# Patient Record
Sex: FEMALE | Race: WHITE | Employment: OTHER | URBAN - NONMETROPOLITAN AREA
[De-identification: names, ages, dates, MRNs, and addresses within clinical notes are randomized per-mention and may not be internally consistent; named-entity substitution may affect disease eponyms.]

---

## 2018-07-24 ENCOUNTER — HOSPITAL ENCOUNTER (OUTPATIENT)
Dept: WOMENS IMAGING | Age: 68
Discharge: HOME | End: 2018-07-24
Payer: MEDICARE

## 2018-07-24 DIAGNOSIS — Z12.31 VISIT FOR SCREENING MAMMOGRAM: ICD-10-CM

## 2018-07-24 PROCEDURE — 77063 BREAST TOMOSYNTHESIS BI: CPT

## 2018-08-04 ENCOUNTER — HOSPITAL ENCOUNTER (EMERGENCY)
Age: 68
Discharge: HOME | End: 2018-08-04
Payer: MEDICARE

## 2018-08-04 ENCOUNTER — APPOINTMENT (OUTPATIENT)
Dept: INTERVENTIONAL RADIOLOGY/VASCULAR | Age: 68
End: 2018-08-04
Payer: MEDICARE

## 2018-08-04 ENCOUNTER — APPOINTMENT (OUTPATIENT)
Dept: GENERAL RADIOLOGY | Age: 68
End: 2018-08-04
Payer: MEDICARE

## 2018-08-04 ENCOUNTER — APPOINTMENT (OUTPATIENT)
Dept: CT IMAGING | Age: 68
End: 2018-08-04
Payer: MEDICARE

## 2018-08-04 VITALS
HEIGHT: 63 IN | DIASTOLIC BLOOD PRESSURE: 57 MMHG | SYSTOLIC BLOOD PRESSURE: 118 MMHG | TEMPERATURE: 97.9 F | OXYGEN SATURATION: 98 % | RESPIRATION RATE: 14 BRPM | BODY MASS INDEX: 25.16 KG/M2 | WEIGHT: 142 LBS | HEART RATE: 62 BPM

## 2018-08-04 DIAGNOSIS — R51.9 NONINTRACTABLE EPISODIC HEADACHE, UNSPECIFIED HEADACHE TYPE: ICD-10-CM

## 2018-08-04 DIAGNOSIS — R42 DIZZINESS: Primary | ICD-10-CM

## 2018-08-04 LAB
ALBUMIN SERPL-MCNC: 4.2 G/DL (ref 3.5–5.1)
ALP BLD-CCNC: 73 U/L (ref 38–126)
ALT SERPL-CCNC: 18 U/L (ref 11–66)
AMPHETAMINE+METHAMPHETAMINE URINE SCREEN: NEGATIVE
ANION GAP SERPL CALCULATED.3IONS-SCNC: 14 MEQ/L (ref 8–16)
AST SERPL-CCNC: 19 U/L (ref 5–40)
BARBITURATE QUANTITATIVE URINE: NEGATIVE
BASOPHILS # BLD: 0.5 %
BASOPHILS ABSOLUTE: 0.1 THOU/MM3 (ref 0–0.1)
BENZODIAZEPINE QUANTITATIVE URINE: NEGATIVE
BILIRUB SERPL-MCNC: 0.2 MG/DL (ref 0.3–1.2)
BILIRUBIN URINE: NEGATIVE
BLOOD, URINE: NEGATIVE
BUN BLDV-MCNC: 17 MG/DL (ref 7–22)
CALCIUM SERPL-MCNC: 9.4 MG/DL (ref 8.5–10.5)
CANNABINOID QUANTITATIVE URINE: NEGATIVE
CHARACTER, URINE: CLEAR
CHLORIDE BLD-SCNC: 102 MEQ/L (ref 98–111)
CO2: 25 MEQ/L (ref 23–33)
COCAINE METABOLITE QUANTITATIVE URINE: NEGATIVE
COLOR: YELLOW
CREAT SERPL-MCNC: 0.8 MG/DL (ref 0.4–1.2)
EKG ATRIAL RATE: 70 BPM
EKG P AXIS: 69 DEGREES
EKG P-R INTERVAL: 140 MS
EKG Q-T INTERVAL: 398 MS
EKG QRS DURATION: 88 MS
EKG QTC CALCULATION (BAZETT): 429 MS
EKG R AXIS: 70 DEGREES
EKG T AXIS: 73 DEGREES
EKG VENTRICULAR RATE: 70 BPM
EOSINOPHIL # BLD: 1.6 %
EOSINOPHILS ABSOLUTE: 0.2 THOU/MM3 (ref 0–0.4)
ERYTHROCYTE [DISTWIDTH] IN BLOOD BY AUTOMATED COUNT: 13.8 % (ref 11.5–14.5)
ERYTHROCYTE [DISTWIDTH] IN BLOOD BY AUTOMATED COUNT: 46.7 FL (ref 35–45)
ETHYL ALCOHOL, SERUM: < 0.01 %
GFR SERPL CREATININE-BSD FRML MDRD: 71 ML/MIN/1.73M2
GLUCOSE BLD-MCNC: 93 MG/DL (ref 70–108)
GLUCOSE URINE: NEGATIVE MG/DL
HCT VFR BLD CALC: 46.3 % (ref 37–47)
HEMOGLOBIN: 15.4 GM/DL (ref 12–16)
IMMATURE GRANS (ABS): 0.04 THOU/MM3 (ref 0–0.07)
IMMATURE GRANULOCYTES: 0.3 %
KETONES, URINE: NEGATIVE
LEUKOCYTE ESTERASE, URINE: NEGATIVE
LYMPHOCYTES # BLD: 28.9 %
LYMPHOCYTES ABSOLUTE: 3.5 THOU/MM3 (ref 1–4.8)
MCH RBC QN AUTO: 31.1 PG (ref 26–33)
MCHC RBC AUTO-ENTMCNC: 33.3 GM/DL (ref 32.2–35.5)
MCV RBC AUTO: 93.5 FL (ref 81–99)
MONOCYTES # BLD: 8.4 %
MONOCYTES ABSOLUTE: 1 THOU/MM3 (ref 0.4–1.3)
NITRITE, URINE: NEGATIVE
NUCLEATED RED BLOOD CELLS: 0 /100 WBC
OPIATES, URINE: NEGATIVE
OSMOLALITY CALCULATION: 282.5 MOSMOL/KG (ref 275–300)
OXYCODONE: NEGATIVE
PH UA: 5.5
PHENCYCLIDINE QUANTITATIVE URINE: NEGATIVE
PLATELET # BLD: 270 THOU/MM3 (ref 130–400)
PMV BLD AUTO: 9.8 FL (ref 9.4–12.4)
POTASSIUM SERPL-SCNC: 3.8 MEQ/L (ref 3.5–5.2)
PROTEIN UA: NEGATIVE
RBC # BLD: 4.95 MILL/MM3 (ref 4.2–5.4)
SEG NEUTROPHILS: 60.3 %
SEGMENTED NEUTROPHILS ABSOLUTE COUNT: 7.2 THOU/MM3 (ref 1.8–7.7)
SODIUM BLD-SCNC: 141 MEQ/L (ref 135–145)
SPECIFIC GRAVITY, URINE: 1.01 (ref 1–1.03)
TOTAL PROTEIN: 7.1 G/DL (ref 6.1–8)
TROPONIN T: < 0.01 NG/ML
TROPONIN T: < 0.01 NG/ML
UROBILINOGEN, URINE: 0.2 EU/DL
WBC # BLD: 12 THOU/MM3 (ref 4.8–10.8)

## 2018-08-04 PROCEDURE — 99284 EMERGENCY DEPT VISIT MOD MDM: CPT

## 2018-08-04 PROCEDURE — 93005 ELECTROCARDIOGRAM TRACING: CPT | Performed by: PHYSICIAN ASSISTANT

## 2018-08-04 PROCEDURE — 93010 ELECTROCARDIOGRAM REPORT: CPT | Performed by: INTERNAL MEDICINE

## 2018-08-04 PROCEDURE — 36415 COLL VENOUS BLD VENIPUNCTURE: CPT

## 2018-08-04 PROCEDURE — 80053 COMPREHEN METABOLIC PANEL: CPT

## 2018-08-04 PROCEDURE — 85025 COMPLETE CBC W/AUTO DIFF WBC: CPT

## 2018-08-04 PROCEDURE — 81003 URINALYSIS AUTO W/O SCOPE: CPT

## 2018-08-04 PROCEDURE — 70450 CT HEAD/BRAIN W/O DYE: CPT

## 2018-08-04 PROCEDURE — 80307 DRUG TEST PRSMV CHEM ANLYZR: CPT

## 2018-08-04 PROCEDURE — 71045 X-RAY EXAM CHEST 1 VIEW: CPT

## 2018-08-04 PROCEDURE — 93880 EXTRACRANIAL BILAT STUDY: CPT

## 2018-08-04 PROCEDURE — 6370000000 HC RX 637 (ALT 250 FOR IP): Performed by: PHYSICIAN ASSISTANT

## 2018-08-04 PROCEDURE — 84484 ASSAY OF TROPONIN QUANT: CPT

## 2018-08-04 PROCEDURE — G0480 DRUG TEST DEF 1-7 CLASSES: HCPCS

## 2018-08-04 RX ORDER — ACETAMINOPHEN 325 MG/1
650 TABLET ORAL ONCE
Status: COMPLETED | OUTPATIENT
Start: 2018-08-04 | End: 2018-08-04

## 2018-08-04 RX ADMIN — ACETAMINOPHEN 650 MG: 325 TABLET ORAL at 21:43

## 2018-08-04 ASSESSMENT — ENCOUNTER SYMPTOMS
RHINORRHEA: 0
EYE DISCHARGE: 0
EYE PAIN: 0
SHORTNESS OF BREATH: 0
NAUSEA: 1
ABDOMINAL PAIN: 0
COUGH: 0
WHEEZING: 0
DIARRHEA: 0
BACK PAIN: 0
VOMITING: 1
SORE THROAT: 0

## 2018-08-04 ASSESSMENT — PAIN SCALES - GENERAL: PAINLEVEL_OUTOF10: 2

## 2018-08-04 NOTE — ED NOTES
Pt presents to ED with c/o sudden onset of nausea/dizziness. Pt states she has been outside all day and this onset of dizziness/nausea happened when she was just standing. Pt denies any pain at this time.      Aureliano Chicas, LPN  27/45/11 [id number]

## 2018-08-04 NOTE — ED PROVIDER NOTES
Negative for congestion, ear pain, rhinorrhea, sore throat, trouble swallowing and voice change. Eyes: Negative for pain, discharge and visual disturbance. Respiratory: Negative for cough, shortness of breath and wheezing. Cardiovascular: Negative for chest pain, palpitations and leg swelling. Gastrointestinal: Positive for nausea and vomiting. Negative for abdominal pain and diarrhea. Genitourinary: Negative for difficulty urinating, dysuria, hematuria and vaginal discharge. Urinary Incontinence   Musculoskeletal: Negative for arthralgias, back pain, joint swelling and neck pain. Skin: Negative for pallor and rash. Neurological: Positive for dizziness (episodic) and headaches. Negative for syncope, weakness, light-headedness and numbness. Hematological: Negative for adenopathy. Psychiatric/Behavioral: Negative for confusion and suicidal ideas. The patient is not nervous/anxious. PAST MEDICAL HISTORY    has no past medical history on file. SURGICAL HISTORY      has a past surgical history that includes Hysterectomy. CURRENT MEDICATIONS       There are no discharge medications for this patient. ALLERGIES     is allergic to pcn [penicillins]. FAMILY HISTORY     has no family status information on file. family history is not on file. SOCIAL HISTORY      reports that she has been smoking. She does not have any smokeless tobacco history on file. PHYSICAL EXAM     INITIAL VITALS:  height is 5' 3\" (1.6 m) and weight is 142 lb (64.4 kg). Her oral temperature is 97.9 °F (36.6 °C). Her blood pressure is 118/57 (abnormal) and her pulse is 62. Her respiration is 14 and oxygen saturation is 98%. Physical Exam   Constitutional: She is oriented to person, place, and time. She appears well-developed and well-nourished. Non-toxic appearance. No distress. HENT:   Head: Normocephalic and atraumatic.    Right Ear: Hearing, tympanic membrane, external ear and ear canal normal. No hemotympanum. Left Ear: Hearing, tympanic membrane, external ear and ear canal normal. No hemotympanum. Nose: Nose normal. No sinus tenderness. Right sinus exhibits no maxillary sinus tenderness and no frontal sinus tenderness. Left sinus exhibits no maxillary sinus tenderness and no frontal sinus tenderness. Mouth/Throat: Uvula is midline, oropharynx is clear and moist and mucous membranes are normal. No oropharyngeal exudate, posterior oropharyngeal edema or posterior oropharyngeal erythema. Eyes: Conjunctivae and EOM are normal. Pupils are equal, round, and reactive to light. Right eye exhibits no discharge. Left eye exhibits no discharge. No scleral icterus. Neck: Normal range of motion, full passive range of motion without pain and phonation normal. Neck supple. No JVD present. No spinous process tenderness and no muscular tenderness present. Carotid bruit is not present. No Brudzinski's sign and no Kernig's sign noted. Cardiovascular: Normal rate, regular rhythm, intact distal pulses and normal pulses. Exam reveals no gallop and no friction rub. Murmur heard. Systolic murmur is present with a grade of 2/6   Pulses:       Radial pulses are 2+ on the right side, and 2+ on the left side. Dorsalis pedis pulses are 2+ on the right side, and 2+ on the left side. Posterior tibial pulses are 2+ on the right side, and 2+ on the left side. Patient states she knows she has a heart murmur. Denies any chest pain, shortness of breath, or syncope. No LE edema bilat. Pulmonary/Chest: Effort normal and breath sounds normal. No respiratory distress. She has no decreased breath sounds. She has no wheezes. She has no rhonchi. She has no rales. She exhibits no tenderness. Abdominal: Soft. Bowel sounds are normal. She exhibits no distension. There is no tenderness. There is no rebound, no guarding and no CVA tenderness. Musculoskeletal: Normal range of motion.  She exhibits no no stemi. Troponin and repeat 2 hr troponin normal. CXR, CT head, and carotid US normal. Orthostatic VS not suggestive of orthostatic hypotension. Patient given Tylenol for mild frontal HA and on reevaluation denies having any HA now. Patient denies HA that was acute and maximal at onset. No meningeal symptoms on exam, no signs of trauma. Discussed with patient admission and further imaging conducted but patient does not want to be admitted at this time. Patient instructed to stay well-hydrated and have close follow-up with PCP. Patient and family given return precautions to ED and discharged home in stable condition. CRITICAL CARE:   none    CONSULTS:  none    PROCEDURES:  none    FINAL IMPRESSION      1. Dizziness    2. Nonintractable episodic headache, unspecified headache type          DISPOSITION/PLAN   Discharge  1. Stay well-hydrated and well-nourished as discussed. 2. Recommend OTC tylenol or motrin as discussed for episodic headaches  3. follow-up with primary care physician on Monday. 4. return to emergency department if any fever, vision changes, dizziness returns, chest pain, shortness of breath, extremity weakness; or any new or worsening symptoms. PATIENT REFERRED TO:  Tuan Peterson MD  [address] [place] [address]  [phone]      follow-up with primary care physician on Monday. [address] [facility] [address] [address] EMERGENCY DEPT  [address] [facility]  [phone]    return to emergency department if any fever, vision changes, dizziness returns, chest pain, shortness of breath, extremity weakness; or any new or worsening symptoms. DISCHARGE MEDICATIONS:  There are no discharge medications for this patient. (Please note that portions of this note were completed with a voice recognition program.  Efforts were made to edit the dictations but occasionally words are mis-transcribed.)    The patient was given an opportunity to see the Emergency Attending.  The patient

## 2018-08-06 ASSESSMENT — ENCOUNTER SYMPTOMS
TROUBLE SWALLOWING: 0
VOICE CHANGE: 0

## 2018-08-24 ENCOUNTER — HOSPITAL ENCOUNTER (EMERGENCY)
Age: 68
Discharge: HOME | End: 2018-08-24
Payer: MEDICARE

## 2018-08-24 VITALS
HEART RATE: 65 BPM | TEMPERATURE: 98.3 F | OXYGEN SATURATION: 95 % | RESPIRATION RATE: 16 BRPM | SYSTOLIC BLOOD PRESSURE: 137 MMHG | DIASTOLIC BLOOD PRESSURE: 62 MMHG

## 2018-08-24 DIAGNOSIS — M54.50 ACUTE LEFT-SIDED LOW BACK PAIN WITHOUT SCIATICA: Primary | ICD-10-CM

## 2018-08-24 DIAGNOSIS — L30.9 ECZEMA, UNSPECIFIED TYPE: ICD-10-CM

## 2018-08-24 PROCEDURE — 99212 OFFICE O/P EST SF 10 MIN: CPT

## 2018-08-24 PROCEDURE — 99213 OFFICE O/P EST LOW 20 MIN: CPT | Performed by: NURSE PRACTITIONER

## 2018-08-24 RX ORDER — IBUPROFEN 400 MG/1
400 TABLET ORAL EVERY 6 HOURS PRN
COMMUNITY

## 2018-08-24 RX ORDER — CYCLOBENZAPRINE HCL 5 MG
5 TABLET ORAL 3 TIMES DAILY PRN
Qty: 12 TABLET | Refills: 0 | Status: SHIPPED | OUTPATIENT
Start: 2018-08-24 | End: 2022-03-03

## 2018-08-24 RX ORDER — BETAMETHASONE DIPROPIONATE 0.05 %
OINTMENT (GRAM) TOPICAL
Qty: 1 TUBE | Refills: 0 | Status: SHIPPED | OUTPATIENT
Start: 2018-08-24 | End: 2022-03-03

## 2018-08-24 RX ORDER — METHYLPREDNISOLONE 4 MG/1
TABLET ORAL
Qty: 1 KIT | Refills: 0 | Status: SHIPPED | OUTPATIENT
Start: 2018-08-24 | End: 2018-08-30

## 2018-08-24 RX ORDER — METHYLPREDNISOLONE 4 MG/1
TABLET ORAL
Qty: 1 KIT | Refills: 0 | Status: SHIPPED | OUTPATIENT
Start: 2018-08-24 | End: 2018-08-24

## 2018-08-24 ASSESSMENT — ENCOUNTER SYMPTOMS
DIARRHEA: 0
BACK PAIN: 1
SHORTNESS OF BREATH: 0
NAUSEA: 0
VOMITING: 0
ABDOMINAL PAIN: 0
CHEST TIGHTNESS: 0

## 2018-08-24 ASSESSMENT — PAIN DESCRIPTION - DESCRIPTORS: DESCRIPTORS: CONSTANT;ACHING

## 2018-08-24 ASSESSMENT — PAIN SCALES - GENERAL: PAINLEVEL_OUTOF10: 5

## 2018-08-24 ASSESSMENT — PAIN DESCRIPTION - ORIENTATION: ORIENTATION: LEFT

## 2018-08-24 ASSESSMENT — PAIN DESCRIPTION - LOCATION: LOCATION: BACK

## 2018-08-24 ASSESSMENT — PAIN DESCRIPTION - PAIN TYPE: TYPE: ACUTE PAIN

## 2018-08-24 NOTE — ED NOTES
No change in patients condition. Discharge instructions and prescriptions discussed with pt. Pt. Verbalized understanding of info given and ambulated to exit in stable condition.       Solomon Britt RN  08/24/18 [id number]

## 2018-08-24 NOTE — ED PROVIDER NOTES
used smokeless tobacco. She reports that she does not drink alcohol or use drugs. PHYSICAL EXAM     ED TRIAGE VITALS  BP: 137/62, Temp: 98.3 °F (36.8 °C), Pulse: 65, Resp: 16, SpO2: 95 %  Physical Exam   Constitutional: She is oriented to person, place, and time. Vital signs are normal. She appears well-developed and well-nourished. She is cooperative. She does not appear ill. No distress. HENT:   Head: Normocephalic and atraumatic. Mouth/Throat: Mucous membranes are normal.   Eyes: Conjunctivae are normal.   Neck: Normal range of motion and full passive range of motion without pain. Cardiovascular: Normal rate. Pulmonary/Chest: Effort normal. No accessory muscle usage. No respiratory distress. Musculoskeletal:        Lumbar back: She exhibits tenderness (left) and spasm (left). Neurological: She is alert and oriented to person, place, and time. Skin: Skin is warm and dry. Rash (to bilateral palms) noted. She is not diaphoretic. Psychiatric: She has a normal mood and affect. Her speech is normal.   Nursing note and vitals reviewed. DIAGNOSTIC RESULTS   Labs:No results found for this visit on 08/24/18. IMAGING:  No orders to display     URGENT CARE COURSE:     Vitals:    08/24/18 1446   BP: 137/62   Pulse: 65   Resp: 16   Temp: 98.3 °F (36.8 °C)   TempSrc: Oral   SpO2: 95%       Medications - No data to display  PROCEDURES:  None  FINAL IMPRESSION      1. Acute left-sided low back pain without sciatica    2. Eczema, unspecified type        DISPOSITION/PLAN   DISPOSITION Decision To Discharge 08/24/2018 03:02:16 PM  Physical assessment findings, diagnostic testing(s) if applicable, and vital signs reviewed with patient/patient representative. Questions answered. Medications as directed, including OTC medications for supportive care. Education provided on medications. Differential diagnosis(s) discussed with patient/patient representative.   Home care/self care instructions reviewed

## 2018-09-26 ENCOUNTER — HOSPITAL ENCOUNTER (OUTPATIENT)
Dept: MRI IMAGING | Age: 68
Discharge: HOME | End: 2018-09-26
Payer: MEDICARE

## 2018-09-26 DIAGNOSIS — M54.16 LUMBAR RADICULOPATHY: ICD-10-CM

## 2018-09-26 DIAGNOSIS — M54.5 LOW BACK PAIN, UNSPECIFIED BACK PAIN LATERALITY, UNSPECIFIED CHRONICITY, WITH SCIATICA PRESENCE UNSPECIFIED: ICD-10-CM

## 2018-09-26 PROCEDURE — 72148 MRI LUMBAR SPINE W/O DYE: CPT

## 2018-10-18 ENCOUNTER — HOSPITAL ENCOUNTER (OUTPATIENT)
Dept: INTERVENTIONAL RADIOLOGY/VASCULAR | Age: 68
Discharge: HOME | End: 2018-10-18
Payer: MEDICARE

## 2018-10-18 VITALS
DIASTOLIC BLOOD PRESSURE: 62 MMHG | BODY MASS INDEX: 23.91 KG/M2 | RESPIRATION RATE: 16 BRPM | OXYGEN SATURATION: 99 % | SYSTOLIC BLOOD PRESSURE: 132 MMHG | HEART RATE: 70 BPM | TEMPERATURE: 97 F | WEIGHT: 135 LBS

## 2018-10-18 PROCEDURE — 2709999900 HC NON-CHARGEABLE SUPPLY

## 2018-10-18 PROCEDURE — 2500000003 HC RX 250 WO HCPCS

## 2018-10-18 PROCEDURE — 6360000002 HC RX W HCPCS

## 2018-10-18 PROCEDURE — 62323 NJX INTERLAMINAR LMBR/SAC: CPT | Performed by: RADIOLOGY

## 2018-10-18 PROCEDURE — 6360000004 HC RX CONTRAST MEDICATION: Performed by: RADIOLOGY

## 2018-10-18 RX ORDER — BUPIVACAINE HYDROCHLORIDE 2.5 MG/ML
5 INJECTION, SOLUTION EPIDURAL; INFILTRATION; INTRACAUDAL ONCE
Status: COMPLETED | OUTPATIENT
Start: 2018-10-18 | End: 2018-10-18

## 2018-10-18 RX ORDER — METHYLPREDNISOLONE ACETATE 80 MG/ML
80 INJECTION, SUSPENSION INTRA-ARTICULAR; INTRALESIONAL; INTRAMUSCULAR; SOFT TISSUE ONCE
Status: COMPLETED | OUTPATIENT
Start: 2018-10-18 | End: 2018-10-18

## 2018-10-18 RX ADMIN — IOHEXOL 1 ML: 180 INJECTION INTRAVENOUS at 13:32

## 2018-10-18 RX ADMIN — METHYLPREDNISOLONE ACETATE 80 MG: 80 INJECTION, SUSPENSION INTRA-ARTICULAR; INTRALESIONAL; INTRAMUSCULAR; SOFT TISSUE at 13:32

## 2018-10-18 RX ADMIN — BUPIVACAINE HYDROCHLORIDE 2 ML: 2.5 INJECTION, SOLUTION EPIDURAL; INFILTRATION; INTRACAUDAL at 13:32

## 2018-10-18 ASSESSMENT — PAIN SCALES - GENERAL
PAINLEVEL_OUTOF10: 0
PAINLEVEL_OUTOF10: 4

## 2018-10-18 ASSESSMENT — PAIN DESCRIPTION - DESCRIPTORS: DESCRIPTORS: ACHING

## 2018-10-18 ASSESSMENT — PAIN - FUNCTIONAL ASSESSMENT: PAIN_FUNCTIONAL_ASSESSMENT: 0-10

## 2018-10-18 NOTE — PROGRESS NOTES
1210 pt arrives to OPN for nerve block. Pain 4/10 mid lower back with numbness down left leg. Hand grasp, pedal push and pull equal and strong.  at bedside, denies use of blood thinners  1213 PATIENT RIGHTS AND RESPONSIBILITIES SHEET OFFERED TO PT TO READ. 1220 side rail up x1, call light in reach  1315 to specials via cart  1345 [facility]  1400 tolerating liquids, denies needs.  Call light in reach  [phone] __m__ Safety:       (Environmental)  Elizabeth Rodarte to environment   Ensure ID band is correct and in place/ allergy band as needed   Assess for fall risk   Initiate fall precautions as applicable (fall band, side rails, etc.)   Call light within reach   Bed in low position/ wheels locked    _m___ Pain:        Assess pain level and characteristics   Administer analgesics as ordered   Assess effectiveness of pain management and report to MD as needed    _m___ Knowledge Deficit:   Assess baseline knowledge   Provide teaching at level of understanding   Provide teaching via preferred learning method   Evaluate teaching effectiveness    __m__ Hemodynamic/Respiratory Status:       (Pre and Post Procedure Monitoring)   Assess/Monitor vital signs and LOC   Assess Baseline SpO2 prior to any sedation   Obtain weight/height   Assess vital signs/ LOC until patient meets discharge criteria   Monitor procedure site and notify MD of any issues      1420 WRITTEN DISCHARGE INSTRUCTIONS GIVEN TO PT-VERBALIZES UNDERSTANDING  1425   PT DISCHARGED PER WHEEL CHAIR IN SATISFACTORY CONDITION

## 2018-10-18 NOTE — PROGRESS NOTES
Department of Radiology  Post Procedure Progress Note    Pre-Procedure Diagnosis:  Lumbar radiculopathy     Procedure Performed:  Epidural block/steroid injection      Anesthesia: local     Findings: successful    Immediate Complications:  None    Estimated Blood Loss: minimal    SEE DICTATED PROCEDURE NOTE FOR COMPLETE DETAILS.       Yuly Kelly MD   10/18/2018 1:34 PM

## 2018-12-05 RX ORDER — METHYLPREDNISOLONE ACETATE 80 MG/ML
80 INJECTION, SUSPENSION INTRA-ARTICULAR; INTRALESIONAL; INTRAMUSCULAR; SOFT TISSUE ONCE
Status: CANCELLED | OUTPATIENT
Start: 2018-12-05 | End: 2018-12-05

## 2018-12-05 RX ORDER — BUPIVACAINE HYDROCHLORIDE 2.5 MG/ML
5 INJECTION, SOLUTION EPIDURAL; INFILTRATION; INTRACAUDAL ONCE
Status: CANCELLED | OUTPATIENT
Start: 2018-12-05 | End: 2018-12-05

## 2018-12-18 RX ORDER — BUPIVACAINE HYDROCHLORIDE 2.5 MG/ML
5 INJECTION, SOLUTION EPIDURAL; INFILTRATION; INTRACAUDAL ONCE
Status: CANCELLED | OUTPATIENT
Start: 2018-12-18 | End: 2018-12-18

## 2018-12-18 RX ORDER — METHYLPREDNISOLONE ACETATE 80 MG/ML
80 INJECTION, SUSPENSION INTRA-ARTICULAR; INTRALESIONAL; INTRAMUSCULAR; SOFT TISSUE ONCE
Status: CANCELLED | OUTPATIENT
Start: 2018-12-18 | End: 2018-12-18

## 2019-04-18 ENCOUNTER — HOSPITAL ENCOUNTER (OUTPATIENT)
Dept: GENERAL RADIOLOGY | Age: 69
Discharge: HOME | End: 2019-04-18
Payer: MEDICARE

## 2019-04-18 ENCOUNTER — HOSPITAL ENCOUNTER (OUTPATIENT)
Age: 69
Discharge: HOME | End: 2019-04-18
Payer: MEDICARE

## 2019-04-18 DIAGNOSIS — Z01.818 PREOP TESTING: ICD-10-CM

## 2019-04-18 PROCEDURE — 71046 X-RAY EXAM CHEST 2 VIEWS: CPT

## 2019-06-28 ENCOUNTER — HOSPITAL ENCOUNTER (OUTPATIENT)
Dept: PHYSICAL THERAPY | Age: 69
Setting detail: THERAPIES SERIES
Discharge: HOME | End: 2019-06-28
Payer: MEDICARE

## 2019-06-28 PROCEDURE — 97161 PT EVAL LOW COMPLEX 20 MIN: CPT

## 2019-06-28 ASSESSMENT — PAIN DESCRIPTION - FREQUENCY: FREQUENCY: INTERMITTENT

## 2019-06-28 ASSESSMENT — PAIN DESCRIPTION - DESCRIPTORS: DESCRIPTORS: STABBING;SHOOTING

## 2019-06-28 ASSESSMENT — PAIN DESCRIPTION - ORIENTATION: ORIENTATION: RIGHT

## 2019-06-28 ASSESSMENT — PAIN DESCRIPTION - PAIN TYPE: TYPE: ACUTE PAIN;SURGICAL PAIN

## 2019-06-28 ASSESSMENT — PAIN SCALES - GENERAL: PAINLEVEL_OUTOF10: 2

## 2019-06-28 ASSESSMENT — PAIN DESCRIPTION - LOCATION: LOCATION: BACK;LEG

## 2019-06-28 ASSESSMENT — PAIN DESCRIPTION - PROGRESSION: CLINICAL_PROGRESSION: GRADUALLY IMPROVING

## 2019-06-28 NOTE — FLOWSHEET NOTE
** PLEASE SIGN, DATE AND TIME CERTIFICATION BELOW AND RETURN TO [facility] OUTPATIENT REHABILITATION (FAX #: [phone]). ATTEST/CO-SIGN IF ACCESSING VIA [employer]. THANK YOU.**    I certify that I have examined the patient below and determined that Physical Medicine and Rehabilitation service is necessary and that I approve the established plan of care for up to 90 days or as specifically noted. Attestation, signature or co-signature of physician indicates approval of certification requirements.    ________________________ ____________ __________  Physician Signature   Date   Time       [place]     Time In: 9199  Time Out: 1600  Minutes: 50                   Date: 2019  Patient Name: Jesus Dunn,  Gender:  female        CSN: [id number]   : 1950  (70 y.o.)  Referral Date : 19     Referring Practitioner: Dr. Nikki Luna       Diagnosis: M56.646  Treatment Diagnosis: low back pain; sciatica; difficulty walking  Additional Pertinent Hx: Fibromyalgia       General:  PT Visit Information  Onset Date: 19  PT Insurance Information: [employer] Medicare; aquatic  therapy covered; modalities covered except Alexa Loco; pre-certification needed   Total # of Visits Approved: 1  Total # of Visits to Date: 1  Plan of Care/Certification Expiration Date: 19  Progress Note Due Date: 19  Progress Note Counter: 1                        See Medical History Questionnaire for information related to allergies and medications. Subjective:  Chart Reviewed: Yes  Patient assessed for rehabilitation services?: Yes  Response To Previous Treatment: Not applicable  Family / Caregiver Present: No  Comments: Follow up scheduled 2019     Subjective: On Jenifer 3 she had L3/L4 laminectomy. Prior to surgery she did experience some sciatic type pain, however since surgery it has gotten worse.   The pain travels down to her right ankle and it feels like it needs  to be stretched. Dr. Tomas Tapia seemed somewhat surprised by her pain; he had expected her to be walking around pain free. Aggravating factors include laying down on her back; sitting for longer then 10 min.; walking will aggravate her back as well. According to [facility] , she was in extreme pain for the first 2 weeks; it has improved some and she is getting rest now.        Pain:  Patient Currently in Pain: Yes  Pain Assessment: 0-10  Pain Level: 2  Pain Type: Acute pain, Surgical pain  Pain Location: Back, Leg  Pain Orientation: Right  Pain Radiating Towards: Radiates down to her ankle   Pain Descriptors: Stabbing, Shooting  Pain Frequency: Intermittent  Clinical Progression: Gradually improving  Non-Pharmaceutical Pain Intervention(s): Cold applied     Social/Functional History:    Type of Home: House  Home Layout: One level  Home Access: Level entry             ADL Assistance: Independent  Homemaking Assistance: Independent  Ambulation Assistance: Independent  Transfer Assistance: Independent    Active : No  Mode of Transportation: Car, Family  Occupation: Retired  Leisure & Hobbies: walking; drive with confidence and without pain     Objective              Observation/Palpation  Posture: Good  Palpation: Tender with direct palpation on right piriformis and along SI joint on the right  Scar: very small incision over L4/L5 region             Strength RLE: Exception  R Hip Flexion: 3+/5  R Knee Flexion: 4-/5    Strength LLE: WFL               Rolling to Left: Stand by assistance  Rolling to Right: Stand by assistance  Supine to Sit: Stand by assistance  Sit to Supine: Independent                             Ambulation 1  Surface: carpet  Device: No Device  Assistance: Independent  Gait Deviations: Slow Antonia, Decreased step length  Distance: throughout the clinic                    Balance  Posture: Good  Sitting - Static: Good, +  Sitting - Dynamic: Good, +  Standing - Static: Good  Standing - Dynamic: Fair           Exercises  Exercise 1: MET correction of right vertical sacral rotation 3x10 sec hold   Exercise 2: Manually stretched right piriformis 3x30 sec hold  Exercise 3: Provided and reviewed handout with HEP-piriformis stretch, pelvic tilt             Activity Tolerance:  Activity Tolerance: Patient Tolerated treatment well, Patient limited by pain  Activity Tolerance: Some increase in discomfort at end of evaluation     Assessment: Body structures, Functions, Activity limitations: Decreased functional mobility , Decreased strength, Decreased endurance, Increased Pain  Assessment: Mckenna Torres is a 70 yr old woman presenting to therapy with chief complaint of increasing right sided low back pain following a lumbar laminectomy. Today's evaluation indicated deficits in strength, transfers and general mobility due to substantial pain and muscle guarding throughout the right side of her back. She will greatly benefit from therapy to work to improve flexibility/mobility, control pain and to establish a core strengthening program to provide greater internal support to her spine.    Prognosis: Good  Discharge Recommendations: Continue to assess pending progress    Patient Education:  Patient Education: Established HEP including piriformis stretch, pelvic tilt                   Plan:  Times per week: 2x per week  Plan weeks: 8 weeks  Specific instructions for Next Treatment: Re-check SI joint alignment; progress LE stretching program; DLS  Current Treatment Recommendations: Strengthening, Transfer Training, Balance Training, Neuromuscular Re-education, Manual Therapy - Joint Manipulation, Manual Therapy - Soft Tissue Mobilization, Pain Management, Patient/Caregiver Education & Training, Modalities, Home Exercise Program    History: Personal factors or comorbidities that impact plan of care - Low Complexity: no personal factors or comorbidities    Examination: Body structures and functions, activity limitations, participation restrictions; using standardized tests and measures - Low Complexity: 1-2 body structures and functional, activity limitation and/or participation restrictions. See restrictions and objective section above for details. Clinical Presentation: Moderate - Evolving with Changing Characteristics: radicular symptoms traveling further down her leg indicating peripheralization of her radicular symptoms; extensive muscle guarding and pain     Decision Making: Low Complexity due to despite her pain, she is able to be independent with ambulation, ADLs. Decision making was based on patient assessment and decision making process in determining plan of care and establishing reasonable expectations for measurable functional outcomes. Evaluation Complexity: Based on the findings of patient history, examination, clinical presentation, and decision making during this evaluation, the evaluation of Daniel Mitchell  is of low complexity. Goals:  Patient goals : Resolve back/butt pain; return to walking without limitation     Short term goals  Time Frame for Short term goals: 4 weeks   Short term goal 1: [facility] will be able to demonstrate and maintain good abdominal bracing with all exercises, transfers etc.   Short term goal 2: Centralization of referred radicular symptoms as her piriformis releases.   Short term goal 3: [facility] will be able to sleep through the night without waking do to radicular symptoms down her leg    Long term goals  Time Frame for Long term goals : 8 weeks   Long term goal 1: Discharge with independent HEP  Long term goal 2: Complete resolution of radicular symptoms   Long term goal 3: [facility] will be able to walk, sit etc for 60+ min at a time without being limited by low back pain    Ilsa Guzman, PT

## 2019-07-16 ENCOUNTER — HOSPITAL ENCOUNTER (OUTPATIENT)
Dept: PHYSICAL THERAPY | Age: 69
Setting detail: THERAPIES SERIES
Discharge: HOME | End: 2019-07-16
Payer: MEDICARE

## 2019-07-16 PROCEDURE — 97110 THERAPEUTIC EXERCISES: CPT

## 2019-07-16 ASSESSMENT — PAIN SCALES - GENERAL: PAINLEVEL_OUTOF10: 3

## 2019-07-16 ASSESSMENT — PAIN DESCRIPTION - PROGRESSION: CLINICAL_PROGRESSION: GRADUALLY IMPROVING

## 2019-07-19 ENCOUNTER — HOSPITAL ENCOUNTER (OUTPATIENT)
Dept: PHYSICAL THERAPY | Age: 69
Setting detail: THERAPIES SERIES
Discharge: HOME | End: 2019-07-19
Payer: MEDICARE

## 2019-07-19 PROCEDURE — G0283 ELEC STIM OTHER THAN WOUND: HCPCS

## 2019-07-19 PROCEDURE — 97140 MANUAL THERAPY 1/> REGIONS: CPT

## 2019-07-19 ASSESSMENT — PAIN SCALES - GENERAL: PAINLEVEL_OUTOF10: 3

## 2019-07-19 ASSESSMENT — PAIN DESCRIPTION - PROGRESSION: CLINICAL_PROGRESSION: GRADUALLY IMPROVING

## 2019-07-19 NOTE — PROGRESS NOTES
[place]     Time In: 3659  Time Out: 3287  Minutes: 47  Timed Code Treatment Minutes: 32 Minutes                Date: 2019  Patient Name: Hong Vera,  Gender:  female        CSN: [id number]   : 1950  (70 y.o.)  Referral Date : 19    Referring Practitioner: Dr. George Kimbrough       Diagnosis: A17.649  Treatment Diagnosis: low back pain; sciatica; difficulty walking   Additional Pertinent Hx: Fibromyalgia                  General:  PT Visit Information  Onset Date: 19  PT Insurance Information: [employer]; aquatic  therapy covered; modalities covered except Mena Arrant; pre-certification needed   Total # of Visits Approved: 1  Total # of Visits to Date: 3  Plan of Care/Certification Expiration Date: 19  Progress Note Counter: 3/10               Subjective:  Chart Reviewed: Yes  Patient assessed for rehabilitation services?: Yes  Response To Previous Treatment: Not applicable  Family / Caregiver Present: No  Comments: Follow up scheduled 2019     Subjective: Was unable to sit or do any of the exercises after last time-I'm feeling a little bit better today. Pain:  Patient Currently in Pain: Yes  Pain Assessment: 0-10  Pain Level: 3  Clinical Progression: Gradually improving      Objective       Exercises  Exercise 2: Manually stretched right piriformis 3x30 second hold  Exercise 6: Bilateral hamstring stretches with towel 3x 15 seconds each. Exercise 8: Left side lying for STM along right flank, piriformis and glutes  Exercise 9: Left side lying for IFC to right glute/piriformis with ice sweep at 40% for 15 min         Activity Tolerance:  Activity Tolerance: Patient Tolerated treatment well, Patient limited by pain    Assessment:   Body structures, Functions, Activity limitations: Decreased functional mobility , Decreased strength, Decreased endurance, Increased Pain  Assessment: Hamida Cuadra was very guarded and tender throughout her back today; backed off the core exercises and will have her focus on stretching, pelvic tilt and walking. Trial of IFC in attempt to lessen muscle gurading/spasms. Prognosis: Good  Discharge Recommendations: Continue to assess pending progress    Patient Education:  Patient Education: Monitor pain after therapy session. Continue HEP adding core strengthening and hamstring stretches with handout provided. Plan:  Times per week: 2x per week  Plan weeks: 8 weeks  Specific instructions for Next Treatment: Re-check SI joint alignment; progress LE stretching program; DLS  Current Treatment Recommendations: Strengthening, Transfer Training, Balance Training, Neuromuscular Re-education, Manual Therapy - Joint Manipulation, Manual Therapy - Soft Tissue Mobilization, Pain Management, Patient/Caregiver Education & Training, Modalities, Home Exercise Program  Plan Comment: Follow up on IFC; continued with current POC    Goals:  Patient goals : Resolve back/butt pain; return to walking without limitation     Short term goals  Time Frame for Short term goals: 4 weeks   Short term goal 1: Ponce Bird will be able to demonstrate and maintain good abdominal bracing with all exercises, transfers etc.   Short term goal 2: Centralization of referred radicular symptoms as her piriformis releases.   Short term goal 3: [facility] Bird will be able to sleep through the night without waking do to radicular symptoms down her leg    Long term goals  Time Frame for Long term goals : 8 weeks   Long term goal 1: Discharge with independent HEP  Long term goal 2: Complete resolution of radicular symptoms   Long term goal 3: [place] Bird will be able to walk, sit etc for 60+ min at a time without being limited by low back pain    Mikey Palma, PT

## 2019-07-23 ENCOUNTER — HOSPITAL ENCOUNTER (OUTPATIENT)
Dept: PHYSICAL THERAPY | Age: 69
Setting detail: THERAPIES SERIES
Discharge: HOME | End: 2019-07-23
Payer: MEDICARE

## 2019-07-23 PROCEDURE — 97140 MANUAL THERAPY 1/> REGIONS: CPT

## 2019-07-23 PROCEDURE — 97035 APP MDLTY 1+ULTRASOUND EA 15: CPT

## 2019-07-23 ASSESSMENT — PAIN DESCRIPTION - PROGRESSION: CLINICAL_PROGRESSION: GRADUALLY IMPROVING

## 2019-07-23 ASSESSMENT — PAIN SCALES - GENERAL: PAINLEVEL_OUTOF10: 1

## 2019-07-26 ENCOUNTER — HOSPITAL ENCOUNTER (OUTPATIENT)
Dept: PHYSICAL THERAPY | Age: 69
Setting detail: THERAPIES SERIES
Discharge: HOME | End: 2019-07-26
Payer: MEDICARE

## 2019-07-26 PROCEDURE — 97110 THERAPEUTIC EXERCISES: CPT

## 2019-07-26 PROCEDURE — 97035 APP MDLTY 1+ULTRASOUND EA 15: CPT

## 2019-07-26 PROCEDURE — 97140 MANUAL THERAPY 1/> REGIONS: CPT

## 2019-07-26 ASSESSMENT — PAIN DESCRIPTION - LOCATION: LOCATION: BACK;LEG

## 2019-07-26 ASSESSMENT — PAIN DESCRIPTION - PAIN TYPE: TYPE: SURGICAL PAIN;ACUTE PAIN

## 2019-07-26 ASSESSMENT — PAIN DESCRIPTION - DESCRIPTORS: DESCRIPTORS: ACHING

## 2019-07-26 ASSESSMENT — PAIN DESCRIPTION - ORIENTATION: ORIENTATION: RIGHT

## 2019-07-26 ASSESSMENT — PAIN SCALES - GENERAL: PAINLEVEL_OUTOF10: 1

## 2019-07-30 ENCOUNTER — HOSPITAL ENCOUNTER (OUTPATIENT)
Dept: PHYSICAL THERAPY | Age: 69
Setting detail: THERAPIES SERIES
Discharge: HOME | End: 2019-07-30
Payer: MEDICARE

## 2019-07-30 PROCEDURE — 97140 MANUAL THERAPY 1/> REGIONS: CPT

## 2019-07-30 PROCEDURE — 97035 APP MDLTY 1+ULTRASOUND EA 15: CPT

## 2019-08-07 ENCOUNTER — HOSPITAL ENCOUNTER (OUTPATIENT)
Dept: PHYSICAL THERAPY | Age: 69
Setting detail: THERAPIES SERIES
Discharge: HOME | End: 2019-08-07
Payer: MEDICARE

## 2019-08-07 PROCEDURE — 97035 APP MDLTY 1+ULTRASOUND EA 15: CPT

## 2019-08-07 PROCEDURE — 97140 MANUAL THERAPY 1/> REGIONS: CPT

## 2019-08-07 PROCEDURE — 97112 NEUROMUSCULAR REEDUCATION: CPT

## 2019-08-07 ASSESSMENT — PAIN SCALES - GENERAL: PAINLEVEL_OUTOF10: 1

## 2019-08-14 ENCOUNTER — HOSPITAL ENCOUNTER (OUTPATIENT)
Dept: PHYSICAL THERAPY | Age: 69
Setting detail: THERAPIES SERIES
Discharge: HOME | End: 2019-08-14
Payer: MEDICARE

## 2019-08-14 PROCEDURE — 97140 MANUAL THERAPY 1/> REGIONS: CPT

## 2019-08-14 PROCEDURE — 97112 NEUROMUSCULAR REEDUCATION: CPT

## 2019-08-14 PROCEDURE — 97035 APP MDLTY 1+ULTRASOUND EA 15: CPT

## 2019-08-21 ENCOUNTER — HOSPITAL ENCOUNTER (OUTPATIENT)
Dept: PHYSICAL THERAPY | Age: 69
Setting detail: THERAPIES SERIES
Discharge: HOME | End: 2019-08-21
Payer: MEDICARE

## 2019-08-21 PROCEDURE — 97035 APP MDLTY 1+ULTRASOUND EA 15: CPT

## 2019-08-21 PROCEDURE — 97140 MANUAL THERAPY 1/> REGIONS: CPT

## 2019-08-21 PROCEDURE — 97112 NEUROMUSCULAR REEDUCATION: CPT

## 2019-08-21 NOTE — DISCHARGE SUMMARY
Functions, Activity limitations: Decreased functional mobility , Decreased strength, Decreased endurance, Increased Pain  Assessment: Dede Delgado is discharged from therapy with independent HEP and instructions on Rock tape. Dede Delgado has made great gains with therapy and continues to work on getting back to her normal walking routine. Prognosis: Good  No Skilled PT: Independent with functional mobility     Patient Education:  Patient Education: Educated pt to tape; educated pt's  on how to tape her                      Plan:  Times per week: Discharge     Goals:  Patient goals : Resolve back/butt pain; return to walking without limitation     Short term goals  Time Frame for Short term goals: 4 weeks   Short term goal 1: Lolita Wilkins will be able to demonstrate and maintain good abdominal bracing with all exercises, transfers etc. GOAL MET  Short term goal 2: Centralization of referred radicular symptoms as her piriformis releases.  GOAL MET; pain isolated to piriformis   Short term goal 3: Lolita Wilkins will be able to sleep through the night without waking do to radicular symptoms down her leg GOAL MET     Long term goals  Time Frame for Long term goals : 8 weeks   Long term goal 1: Discharge with independent HEP ONGOING  Long term goal 2: Complete resolution of radicular symptoms GOAL MET-denies radicular symptoms; reports more of an ache  Long term goal 3: Lolita Wilkins will be able to walk, sit etc for 60+ min at a time without being limited by low back pain NOT MET:  able to stand for 20-30 min; able to sit for 15-20 min; walking tolerance limited to 20 min     Ruthy Gunderson, PT

## 2020-04-23 ENCOUNTER — APPOINTMENT (OUTPATIENT)
Dept: GENERAL RADIOLOGY | Age: 70
End: 2020-04-23
Payer: MEDICARE

## 2020-04-23 ENCOUNTER — HOSPITAL ENCOUNTER (EMERGENCY)
Age: 70
Discharge: HOME | End: 2020-04-23
Attending: EMERGENCY MEDICINE
Payer: MEDICARE

## 2020-04-23 VITALS
HEIGHT: 63 IN | TEMPERATURE: 98 F | HEART RATE: 78 BPM | SYSTOLIC BLOOD PRESSURE: 142 MMHG | DIASTOLIC BLOOD PRESSURE: 59 MMHG | WEIGHT: 135 LBS | OXYGEN SATURATION: 98 % | BODY MASS INDEX: 23.92 KG/M2 | RESPIRATION RATE: 19 BRPM

## 2020-04-23 PROCEDURE — 73030 X-RAY EXAM OF SHOULDER: CPT

## 2020-04-23 PROCEDURE — 99283 EMERGENCY DEPT VISIT LOW MDM: CPT

## 2020-04-23 PROCEDURE — 73060 X-RAY EXAM OF HUMERUS: CPT

## 2020-04-23 PROCEDURE — 96375 TX/PRO/DX INJ NEW DRUG ADDON: CPT

## 2020-04-23 PROCEDURE — 96374 THER/PROPH/DIAG INJ IV PUSH: CPT

## 2020-04-23 PROCEDURE — 6360000002 HC RX W HCPCS: Performed by: EMERGENCY MEDICINE

## 2020-04-23 RX ORDER — ONDANSETRON 4 MG/1
4 TABLET, ORALLY DISINTEGRATING ORAL EVERY 8 HOURS PRN
Qty: 20 TABLET | Refills: 0 | Status: SHIPPED | OUTPATIENT
Start: 2020-04-23 | End: 2022-03-03

## 2020-04-23 RX ORDER — HYDROCODONE BITARTRATE AND ACETAMINOPHEN 5; 325 MG/1; MG/1
1 TABLET ORAL EVERY 8 HOURS PRN
Qty: 15 TABLET | Refills: 0 | Status: SHIPPED | OUTPATIENT
Start: 2020-04-23 | End: 2020-04-28

## 2020-04-23 RX ORDER — KETOROLAC TROMETHAMINE 30 MG/ML
15 INJECTION, SOLUTION INTRAMUSCULAR; INTRAVENOUS ONCE
Status: COMPLETED | OUTPATIENT
Start: 2020-04-23 | End: 2020-04-23

## 2020-04-23 RX ADMIN — HYDROMORPHONE HYDROCHLORIDE 1 MG: 1 INJECTION, SOLUTION INTRAMUSCULAR; INTRAVENOUS; SUBCUTANEOUS at 21:01

## 2020-04-23 RX ADMIN — KETOROLAC TROMETHAMINE 15 MG: 30 INJECTION, SOLUTION INTRAMUSCULAR at 21:02

## 2020-04-23 ASSESSMENT — PAIN DESCRIPTION - ORIENTATION: ORIENTATION: RIGHT

## 2020-04-23 ASSESSMENT — PAIN SCALES - GENERAL
PAINLEVEL_OUTOF10: 5
PAINLEVEL_OUTOF10: 5

## 2020-04-23 ASSESSMENT — PAIN DESCRIPTION - DESCRIPTORS: DESCRIPTORS: ACHING

## 2020-04-23 ASSESSMENT — PAIN DESCRIPTION - PAIN TYPE: TYPE: ACUTE PAIN

## 2020-04-23 ASSESSMENT — PAIN DESCRIPTION - LOCATION: LOCATION: ARM;SHOULDER

## 2020-04-24 NOTE — ED PROVIDER NOTES
[facility] EMERGENCY DEPT      CHIEF COMPLAINT       Chief Complaint   Patient presents with    Arm Injury       Nurses Notes reviewed and I agree except as noted in the HPI. HISTORY OF PRESENT ILLNESS    Sabrina Hernandez is a 78 y.o. female who presents plaint of mechanical fall, complaining of right shoulder pain. Patient states that she slipped on a carpet, fell forward, chipped her front teeth. Denies loss of consciousness. Denies neck pain. Onset: Acute  Duration: Prior to arrival  Timing: Constant pain  Location of Pain: Right shoulder  Intesity/severity: Moderate pain  Modifying Factors: Pain made worse with movement  Relieved by;  Previous Episodes; Tx Before arrival: None  REVIEW OF SYSTEMS      Review of Systems   Constitutional: Negative for fever, chills, diaphoresis and fatigue. HENT: Negative for congestion, drooling, facial swelling and sore throat. Front chipped teeth. Eyes: Negative for photophobia, pain and discharge. Respiratory: Negative for cough, shortness of breath, wheezing and stridor. Cardiovascular: Negative for chest pain, palpitations and leg swelling. Gastrointestinal: Negative for abdominal pain, blood in stool and abdominal distention. Genitourinary: Negative for dysuria, urgency, hematuria and difficulty urinating. Musculoskeletal: Positive for right shoulder pain. Skin; No rash, No itching  Neurological: Negative for seizures, weakness and numbness. Psychiatric/Behavioral: Negative for hallucinations, confusion and agitation. PAST MEDICAL HISTORY    has no past medical history on file. SURGICAL HISTORY      has a past surgical history that includes Hysterectomy. CURRENT MEDICATIONS       Previous Medications    BETAMETHASONE DIPROPIONATE (DIPROLENE) 0.05 % OINTMENT    Apply topically 2 times daily.     CYCLOBENZAPRINE (FLEXERIL) 5 MG TABLET    Take 1 tablet by mouth 3 times daily as needed for Muscle spasms    IBUPROFEN (ADVIL;MOTRIN) 400 MG

## 2020-06-23 ENCOUNTER — HOSPITAL ENCOUNTER (OUTPATIENT)
Dept: INTERVENTIONAL RADIOLOGY/VASCULAR | Age: 70
Discharge: HOME | End: 2020-06-23
Payer: MEDICARE

## 2020-06-23 PROCEDURE — 93971 EXTREMITY STUDY: CPT

## 2020-12-03 NOTE — PROGRESS NOTES
[facility]     Time In: 1600  Time Out: 5871  Minutes: 43  Timed Code Treatment Minutes: 37 Minutes                Date: 2019  Patient Name: Buffy De Luna,  Gender:  female        CSN: [id number]   : 1950  (70 y.o.)  Referral Date : 19    Referring Practitioner: Dr. Leslee Lloyd       Diagnosis: F08.480  Treatment Diagnosis: low back pain; sciatica; difficulty walking   Additional Pertinent Hx: Fibromyalgia                  General:  PT Visit Information  Onset Date: 19  PT Insurance Information: [employer] Medicare; aquatic  therapy covered; modalities covered except Wendy Shharry; pre-certification needed   Total # of Visits Approved: 1  Total # of Visits to Date: 4  Plan of Care/Certification Expiration Date: 19  Progress Note Counter: 4/10               Subjective:  Chart Reviewed: Yes  Patient assessed for rehabilitation services?: Yes  Response To Previous Treatment: Not applicable  Family / Caregiver Present: No  Comments: Follow up scheduled 2019     Subjective: Went on a walk this AM, however it was on uneven surfaces which may have aggravated my pain a bit. My pain is localized to only the one spot      Pain:  Patient Currently in Pain: Yes  Pain Assessment: 0-10  Pain Level: 1  Clinical Progression: Gradually improving      Objective         Exercises  Exercise 2: Manually stretched right piriformis 3x30 second hold  Exercise 6: Bilateral hamstring stretches with towel 3x 15 seconds each. Exercise 8: Left side lying for STM along right flank, piriformis and glutes  Exercise 9: Left side lying for US 1.2 W/cm2 pulsed at 1 MHz throughout her right flank  Exercise 10: Gentle manual long axis traction 2x30 sec hold         Activity Tolerance:  Activity Tolerance: Patient Tolerated treatment well    Assessment:   Body structures, Functions, Activity limitations: Decreased functional mobility ,
98

## 2021-10-08 ENCOUNTER — HOSPITAL ENCOUNTER (OUTPATIENT)
Dept: WOMENS IMAGING | Age: 71
Discharge: HOME | End: 2021-10-08
Payer: MEDICARE

## 2021-10-08 DIAGNOSIS — Z12.31 VISIT FOR SCREENING MAMMOGRAM: ICD-10-CM

## 2021-10-08 PROCEDURE — 77063 BREAST TOMOSYNTHESIS BI: CPT

## 2022-03-03 ENCOUNTER — APPOINTMENT (OUTPATIENT)
Dept: GENERAL RADIOLOGY | Age: 72
DRG: 480 | End: 2022-03-03
Payer: MEDICARE

## 2022-03-03 ENCOUNTER — ANESTHESIA (OUTPATIENT)
Dept: OPERATING ROOM | Age: 72
DRG: 480 | End: 2022-03-03
Payer: MEDICARE

## 2022-03-03 ENCOUNTER — APPOINTMENT (OUTPATIENT)
Dept: CT IMAGING | Age: 72
DRG: 480 | End: 2022-03-03
Payer: MEDICARE

## 2022-03-03 ENCOUNTER — HOSPITAL ENCOUNTER (INPATIENT)
Age: 72
LOS: 8 days | Discharge: HOME HEALTH | DRG: 480 | End: 2022-03-11
Attending: ORTHOPAEDIC SURGERY | Admitting: ORTHOPAEDIC SURGERY
Payer: MEDICARE

## 2022-03-03 ENCOUNTER — ANESTHESIA EVENT (OUTPATIENT)
Dept: OPERATING ROOM | Age: 72
DRG: 480 | End: 2022-03-03
Payer: MEDICARE

## 2022-03-03 VITALS — TEMPERATURE: 98.4 F | DIASTOLIC BLOOD PRESSURE: 58 MMHG | OXYGEN SATURATION: 99 % | SYSTOLIC BLOOD PRESSURE: 123 MMHG

## 2022-03-03 DIAGNOSIS — S72.002A CLOSED FRACTURE OF LEFT HIP, INITIAL ENCOUNTER (HCC): ICD-10-CM
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
## 2022-03-03 PROBLEM — S72.142A INTERTROCHANTERIC FRACTURE OF LEFT FEMUR, CLOSED, INITIAL ENCOUNTER (HCC): Status: ACTIVE | Noted: 2022-03-03

## 2022-03-03 LAB
ABO: NORMAL
ANION GAP SERPL CALCULATED.3IONS-SCNC: 12 MEQ/L (ref 8–16)
ANTIBODY SCREEN: NORMAL
APTT: 28 SECONDS (ref 22–38)
BASOPHILS # BLD: 0.5 %
BASOPHILS ABSOLUTE: 0 THOU/MM3 (ref 0–0.1)
BUN BLDV-MCNC: 17 MG/DL (ref 7–22)
CALCIUM SERPL-MCNC: 9.3 MG/DL (ref 8.5–10.5)
CHLORIDE BLD-SCNC: 101 MEQ/L (ref 98–111)
CO2: 23 MEQ/L (ref 23–33)
CREAT SERPL-MCNC: 0.7 MG/DL (ref 0.4–1.2)
EOSINOPHIL # BLD: 0.7 %
EOSINOPHILS ABSOLUTE: 0.1 THOU/MM3 (ref 0–0.4)
ERYTHROCYTE [DISTWIDTH] IN BLOOD BY AUTOMATED COUNT: 13.2 % (ref 11.5–14.5)
ERYTHROCYTE [DISTWIDTH] IN BLOOD BY AUTOMATED COUNT: 43.6 FL (ref 35–45)
GFR SERPL CREATININE-BSD FRML MDRD: 82 ML/MIN/1.73M2
GLUCOSE BLD-MCNC: 99 MG/DL (ref 70–108)
HCT VFR BLD CALC: 43 % (ref 37–47)
HEMOGLOBIN: 13.9 GM/DL (ref 12–16)
IMMATURE GRANS (ABS): 0.05 THOU/MM3 (ref 0–0.07)
IMMATURE GRANULOCYTES: 0.6 %
INR BLD: 1.02 (ref 0.85–1.13)
LYMPHOCYTES # BLD: 31.3 %
LYMPHOCYTES ABSOLUTE: 2.6 THOU/MM3 (ref 1–4.8)
MAGNESIUM: 2.1 MG/DL (ref 1.6–2.4)
MCH RBC QN AUTO: 29.3 PG (ref 26–33)
MCHC RBC AUTO-ENTMCNC: 32.3 GM/DL (ref 32.2–35.5)
MCV RBC AUTO: 90.7 FL (ref 81–99)
MONOCYTES # BLD: 6.7 %
MONOCYTES ABSOLUTE: 0.5 THOU/MM3 (ref 0.4–1.3)
NUCLEATED RED BLOOD CELLS: 0 /100 WBC
OSMOLALITY CALCULATION: 273.5 MOSMOL/KG (ref 275–300)
PLATELET # BLD: 212 THOU/MM3 (ref 130–400)
PMV BLD AUTO: 9 FL (ref 9.4–12.4)
POTASSIUM SERPL-SCNC: 4.1 MEQ/L (ref 3.5–5.2)
RBC # BLD: 4.74 MILL/MM3 (ref 4.2–5.4)
RH FACTOR: NORMAL
SEG NEUTROPHILS: 60.2 %
SEGMENTED NEUTROPHILS ABSOLUTE COUNT: 4.9 THOU/MM3 (ref 1.8–7.7)
SODIUM BLD-SCNC: 136 MEQ/L (ref 135–145)
WBC # BLD: 8.2 THOU/MM3 (ref 4.8–10.8)

## 2022-03-03 PROCEDURE — 96365 THER/PROPH/DIAG IV INF INIT: CPT

## 2022-03-03 PROCEDURE — C1713 ANCHOR/SCREW BN/BN,TIS/BN: HCPCS | Performed by: ORTHOPAEDIC SURGERY

## 2022-03-03 PROCEDURE — 3209999900 FLUORO FOR SURGICAL PROCEDURES

## 2022-03-03 PROCEDURE — 85610 PROTHROMBIN TIME: CPT

## 2022-03-03 PROCEDURE — 6360000002 HC RX W HCPCS: Performed by: PHYSICIAN ASSISTANT

## 2022-03-03 PROCEDURE — 85730 THROMBOPLASTIN TIME PARTIAL: CPT

## 2022-03-03 PROCEDURE — 3600000004 HC SURGERY LEVEL 4 BASE: Performed by: ORTHOPAEDIC SURGERY

## 2022-03-03 PROCEDURE — 2580000003 HC RX 258: Performed by: PHYSICIAN ASSISTANT

## 2022-03-03 PROCEDURE — 6360000002 HC RX W HCPCS: Performed by: ANESTHESIOLOGY

## 2022-03-03 PROCEDURE — 73552 X-RAY EXAM OF FEMUR 2/>: CPT

## 2022-03-03 PROCEDURE — 3600000014 HC SURGERY LEVEL 4 ADDTL 15MIN: Performed by: ORTHOPAEDIC SURGERY

## 2022-03-03 PROCEDURE — 6820000001 HC L2 TRAUMA SURGERY EVALUATION: Performed by: ORTHOPAEDIC SURGERY

## 2022-03-03 PROCEDURE — 96375 TX/PRO/DX INJ NEW DRUG ADDON: CPT

## 2022-03-03 PROCEDURE — 80048 BASIC METABOLIC PNL TOTAL CA: CPT

## 2022-03-03 PROCEDURE — 2580000003 HC RX 258: Performed by: ANESTHESIOLOGY

## 2022-03-03 PROCEDURE — 99285 EMERGENCY DEPT VISIT HI MDM: CPT

## 2022-03-03 PROCEDURE — 86850 RBC ANTIBODY SCREEN: CPT

## 2022-03-03 PROCEDURE — 6360000002 HC RX W HCPCS: Performed by: NURSE ANESTHETIST, CERTIFIED REGISTERED

## 2022-03-03 PROCEDURE — 71045 X-RAY EXAM CHEST 1 VIEW: CPT

## 2022-03-03 PROCEDURE — 72125 CT NECK SPINE W/O DYE: CPT

## 2022-03-03 PROCEDURE — 7100000000 HC PACU RECOVERY - FIRST 15 MIN: Performed by: ORTHOPAEDIC SURGERY

## 2022-03-03 PROCEDURE — 86901 BLOOD TYPING SEROLOGIC RH(D): CPT

## 2022-03-03 PROCEDURE — 1200000000 HC SEMI PRIVATE

## 2022-03-03 PROCEDURE — 70450 CT HEAD/BRAIN W/O DYE: CPT

## 2022-03-03 PROCEDURE — 2720000010 HC SURG SUPPLY STERILE: Performed by: ORTHOPAEDIC SURGERY

## 2022-03-03 PROCEDURE — 85025 COMPLETE CBC W/AUTO DIFF WBC: CPT

## 2022-03-03 PROCEDURE — 73502 X-RAY EXAM HIP UNI 2-3 VIEWS: CPT

## 2022-03-03 PROCEDURE — 6360000002 HC RX W HCPCS: Performed by: ORTHOPAEDIC SURGERY

## 2022-03-03 PROCEDURE — 3700000000 HC ANESTHESIA ATTENDED CARE: Performed by: ORTHOPAEDIC SURGERY

## 2022-03-03 PROCEDURE — 93005 ELECTROCARDIOGRAM TRACING: CPT | Performed by: PHYSICIAN ASSISTANT

## 2022-03-03 PROCEDURE — 2500000003 HC RX 250 WO HCPCS: Performed by: NURSE ANESTHETIST, CERTIFIED REGISTERED

## 2022-03-03 PROCEDURE — 2709999900 HC NON-CHARGEABLE SUPPLY: Performed by: ORTHOPAEDIC SURGERY

## 2022-03-03 PROCEDURE — 0QS706Z REPOSITION LEFT UPPER FEMUR WITH INTRAMEDULLARY INTERNAL FIXATION DEVICE, OPEN APPROACH: ICD-10-PCS | Performed by: DERMATOLOGY

## 2022-03-03 PROCEDURE — 36415 COLL VENOUS BLD VENIPUNCTURE: CPT

## 2022-03-03 PROCEDURE — 2500000003 HC RX 250 WO HCPCS: Performed by: PHYSICIAN ASSISTANT

## 2022-03-03 PROCEDURE — 83735 ASSAY OF MAGNESIUM: CPT

## 2022-03-03 PROCEDURE — 86900 BLOOD TYPING SEROLOGIC ABO: CPT

## 2022-03-03 PROCEDURE — 7100000001 HC PACU RECOVERY - ADDTL 15 MIN: Performed by: ORTHOPAEDIC SURGERY

## 2022-03-03 PROCEDURE — 3700000001 HC ADD 15 MINUTES (ANESTHESIA): Performed by: ORTHOPAEDIC SURGERY

## 2022-03-03 PROCEDURE — 96376 TX/PRO/DX INJ SAME DRUG ADON: CPT

## 2022-03-03 DEVICE — TRIGEN LOW PROFILE SCREW 5.0MM X 35MM
Type: IMPLANTABLE DEVICE | Site: HIP | Status: FUNCTIONAL
Brand: TRIGEN

## 2022-03-03 DEVICE — INTERTAN LAG/COMPRESSION SCREW KIT                                    95MM / 90MM
Type: IMPLANTABLE DEVICE | Site: HIP | Status: FUNCTIONAL
Brand: TRIGEN

## 2022-03-03 DEVICE — TRIGEN LOW PROFILE SCREW 5.0MM X 30MM
Type: IMPLANTABLE DEVICE | Site: HIP | Status: FUNCTIONAL
Brand: TRIGEN

## 2022-03-03 DEVICE — TRIGEN INTERTAN 10S 10MM X 34CM 125DEGREE LEFT
Type: IMPLANTABLE DEVICE | Site: HIP | Status: FUNCTIONAL
Brand: TRIGEN

## 2022-03-03 RX ORDER — SODIUM CHLORIDE 0.9 % (FLUSH) 0.9 %
5-40 SYRINGE (ML) INJECTION PRN
Status: DISCONTINUED | OUTPATIENT
Start: 2022-03-03 | End: 2022-03-11 | Stop reason: HOSPADM

## 2022-03-03 RX ORDER — SODIUM CHLORIDE 0.9 % (FLUSH) 0.9 %
5-40 SYRINGE (ML) INJECTION EVERY 12 HOURS SCHEDULED
Status: DISCONTINUED | OUTPATIENT
Start: 2022-03-04 | End: 2022-03-11 | Stop reason: HOSPADM

## 2022-03-03 RX ORDER — ONDANSETRON 2 MG/ML
4 INJECTION INTRAMUSCULAR; INTRAVENOUS EVERY 6 HOURS PRN
Status: DISCONTINUED | OUTPATIENT
Start: 2022-03-03 | End: 2022-03-11 | Stop reason: HOSPADM

## 2022-03-03 RX ORDER — MORPHINE SULFATE 4 MG/ML
4 INJECTION, SOLUTION INTRAMUSCULAR; INTRAVENOUS ONCE
Status: COMPLETED | OUTPATIENT
Start: 2022-03-03 | End: 2022-03-03

## 2022-03-03 RX ORDER — SODIUM CHLORIDE 0.9 % (FLUSH) 0.9 %
5-40 SYRINGE (ML) INJECTION PRN
Status: DISCONTINUED | OUTPATIENT
Start: 2022-03-03 | End: 2022-03-03 | Stop reason: HOSPADM

## 2022-03-03 RX ORDER — LABETALOL 20 MG/4 ML (5 MG/ML) INTRAVENOUS SYRINGE
10
Status: DISCONTINUED | OUTPATIENT
Start: 2022-03-03 | End: 2022-03-03 | Stop reason: HOSPADM

## 2022-03-03 RX ORDER — SODIUM CHLORIDE 0.9 % (FLUSH) 0.9 %
5-40 SYRINGE (ML) INJECTION EVERY 12 HOURS SCHEDULED
Status: DISCONTINUED | OUTPATIENT
Start: 2022-03-03 | End: 2022-03-03 | Stop reason: HOSPADM

## 2022-03-03 RX ORDER — FENTANYL CITRATE 50 UG/ML
INJECTION, SOLUTION INTRAMUSCULAR; INTRAVENOUS PRN
Status: DISCONTINUED | OUTPATIENT
Start: 2022-03-03 | End: 2022-03-03 | Stop reason: SDUPTHER

## 2022-03-03 RX ORDER — ONDANSETRON 2 MG/ML
INJECTION INTRAMUSCULAR; INTRAVENOUS PRN
Status: DISCONTINUED | OUTPATIENT
Start: 2022-03-03 | End: 2022-03-03 | Stop reason: SDUPTHER

## 2022-03-03 RX ORDER — MORPHINE SULFATE 2 MG/ML
2 INJECTION, SOLUTION INTRAMUSCULAR; INTRAVENOUS EVERY 5 MIN PRN
Status: DISCONTINUED | OUTPATIENT
Start: 2022-03-03 | End: 2022-03-03 | Stop reason: HOSPADM

## 2022-03-03 RX ORDER — PROPOFOL 10 MG/ML
INJECTION, EMULSION INTRAVENOUS PRN
Status: DISCONTINUED | OUTPATIENT
Start: 2022-03-03 | End: 2022-03-03 | Stop reason: SDUPTHER

## 2022-03-03 RX ORDER — MORPHINE SULFATE 2 MG/ML
2 INJECTION, SOLUTION INTRAMUSCULAR; INTRAVENOUS
Status: DISCONTINUED | OUTPATIENT
Start: 2022-03-03 | End: 2022-03-03 | Stop reason: SDUPTHER

## 2022-03-03 RX ORDER — SODIUM CHLORIDE, SODIUM LACTATE, POTASSIUM CHLORIDE, CALCIUM CHLORIDE 600; 310; 30; 20 MG/100ML; MG/100ML; MG/100ML; MG/100ML
INJECTION, SOLUTION INTRAVENOUS CONTINUOUS
Status: DISCONTINUED | OUTPATIENT
Start: 2022-03-04 | End: 2022-03-05

## 2022-03-03 RX ORDER — DIPHENHYDRAMINE HCL 25 MG
25 TABLET ORAL EVERY 6 HOURS PRN
Status: DISCONTINUED | OUTPATIENT
Start: 2022-03-03 | End: 2022-03-06

## 2022-03-03 RX ORDER — CLINDAMYCIN PHOSPHATE 600 MG/50ML
600 INJECTION INTRAVENOUS EVERY 8 HOURS
Status: COMPLETED | OUTPATIENT
Start: 2022-03-03 | End: 2022-03-03

## 2022-03-03 RX ORDER — HYDROCODONE BITARTRATE AND ACETAMINOPHEN 5; 325 MG/1; MG/1
1 TABLET ORAL EVERY 4 HOURS PRN
Status: DISCONTINUED | OUTPATIENT
Start: 2022-03-03 | End: 2022-03-11 | Stop reason: HOSPADM

## 2022-03-03 RX ORDER — SODIUM CHLORIDE 9 MG/ML
25 INJECTION, SOLUTION INTRAVENOUS PRN
Status: DISCONTINUED | OUTPATIENT
Start: 2022-03-03 | End: 2022-03-11 | Stop reason: HOSPADM

## 2022-03-03 RX ORDER — EPHEDRINE SULFATE/0.9% NACL/PF 50 MG/5 ML
SYRINGE (ML) INTRAVENOUS PRN
Status: DISCONTINUED | OUTPATIENT
Start: 2022-03-03 | End: 2022-03-03 | Stop reason: SDUPTHER

## 2022-03-03 RX ORDER — ACETAMINOPHEN 325 MG/1
650 TABLET ORAL EVERY 6 HOURS
Status: DISCONTINUED | OUTPATIENT
Start: 2022-03-04 | End: 2022-03-11 | Stop reason: HOSPADM

## 2022-03-03 RX ORDER — MORPHINE SULFATE 2 MG/ML
2 INJECTION, SOLUTION INTRAMUSCULAR; INTRAVENOUS
Status: DISCONTINUED | OUTPATIENT
Start: 2022-03-03 | End: 2022-03-10

## 2022-03-03 RX ORDER — FENTANYL CITRATE 50 UG/ML
50 INJECTION, SOLUTION INTRAMUSCULAR; INTRAVENOUS EVERY 5 MIN PRN
Status: COMPLETED | OUTPATIENT
Start: 2022-03-03 | End: 2022-03-03

## 2022-03-03 RX ORDER — ROCURONIUM BROMIDE 10 MG/ML
INJECTION, SOLUTION INTRAVENOUS PRN
Status: DISCONTINUED | OUTPATIENT
Start: 2022-03-03 | End: 2022-03-03 | Stop reason: SDUPTHER

## 2022-03-03 RX ORDER — DIPHENHYDRAMINE HYDROCHLORIDE 50 MG/ML
25 INJECTION INTRAMUSCULAR; INTRAVENOUS EVERY 6 HOURS PRN
Status: DISCONTINUED | OUTPATIENT
Start: 2022-03-03 | End: 2022-03-06

## 2022-03-03 RX ORDER — LIDOCAINE HCL/PF 100 MG/5ML
SYRINGE (ML) INJECTION PRN
Status: DISCONTINUED | OUTPATIENT
Start: 2022-03-03 | End: 2022-03-03 | Stop reason: SDUPTHER

## 2022-03-03 RX ORDER — SENNA PLUS 8.6 MG/1
1 TABLET ORAL DAILY PRN
Status: DISCONTINUED | OUTPATIENT
Start: 2022-03-03 | End: 2022-03-04

## 2022-03-03 RX ORDER — ONDANSETRON 4 MG/1
4 TABLET, ORALLY DISINTEGRATING ORAL EVERY 8 HOURS PRN
Status: DISCONTINUED | OUTPATIENT
Start: 2022-03-03 | End: 2022-03-11 | Stop reason: HOSPADM

## 2022-03-03 RX ORDER — SODIUM CHLORIDE 9 MG/ML
25 INJECTION, SOLUTION INTRAVENOUS PRN
Status: DISCONTINUED | OUTPATIENT
Start: 2022-03-03 | End: 2022-03-03 | Stop reason: HOSPADM

## 2022-03-03 RX ADMIN — Medication 60 MG: at 21:59

## 2022-03-03 RX ADMIN — Medication 10 MG: at 22:10

## 2022-03-03 RX ADMIN — MORPHINE SULFATE 2 MG: 2 INJECTION, SOLUTION INTRAMUSCULAR; INTRAVENOUS at 23:55

## 2022-03-03 RX ADMIN — FENTANYL CITRATE 25 MCG: 50 INJECTION, SOLUTION INTRAMUSCULAR; INTRAVENOUS at 22:52

## 2022-03-03 RX ADMIN — CLINDAMYCIN PHOSPHATE 600 MG: 600 INJECTION, SOLUTION INTRAVENOUS at 20:05

## 2022-03-03 RX ADMIN — SODIUM CHLORIDE, POTASSIUM CHLORIDE, SODIUM LACTATE AND CALCIUM CHLORIDE: 600; 310; 30; 20 INJECTION, SOLUTION INTRAVENOUS at 23:57

## 2022-03-03 RX ADMIN — MORPHINE SULFATE 4 MG: 4 INJECTION, SOLUTION INTRAMUSCULAR; INTRAVENOUS at 16:52

## 2022-03-03 RX ADMIN — ONDANSETRON HYDROCHLORIDE 4 MG: 4 INJECTION, SOLUTION INTRAMUSCULAR; INTRAVENOUS at 22:29

## 2022-03-03 RX ADMIN — FENTANYL CITRATE 50 MCG: 50 INJECTION INTRAMUSCULAR; INTRAVENOUS at 22:55

## 2022-03-03 RX ADMIN — Medication 10 MG: at 22:02

## 2022-03-03 RX ADMIN — PROPOFOL 120 MG: 10 INJECTION, EMULSION INTRAVENOUS at 21:59

## 2022-03-03 RX ADMIN — FENTANYL CITRATE 50 MCG: 50 INJECTION, SOLUTION INTRAMUSCULAR; INTRAVENOUS at 21:55

## 2022-03-03 RX ADMIN — ROCURONIUM BROMIDE 30 MG: 10 INJECTION INTRAVENOUS at 21:59

## 2022-03-03 RX ADMIN — MORPHINE SULFATE 2 MG: 2 INJECTION, SOLUTION INTRAMUSCULAR; INTRAVENOUS at 19:49

## 2022-03-03 RX ADMIN — FENTANYL CITRATE 50 MCG: 50 INJECTION INTRAMUSCULAR; INTRAVENOUS at 23:00

## 2022-03-03 RX ADMIN — FENTANYL CITRATE 25 MCG: 50 INJECTION, SOLUTION INTRAMUSCULAR; INTRAVENOUS at 22:18

## 2022-03-03 RX ADMIN — SODIUM CHLORIDE: 9 INJECTION, SOLUTION INTRAVENOUS at 21:47

## 2022-03-03 RX ADMIN — SODIUM CHLORIDE, PRESERVATIVE FREE 10 ML: 5 INJECTION INTRAVENOUS at 23:52

## 2022-03-03 ASSESSMENT — PULMONARY FUNCTION TESTS
PIF_VALUE: 14
PIF_VALUE: 14
PIF_VALUE: 2
PIF_VALUE: 14
PIF_VALUE: 15
PIF_VALUE: 1
PIF_VALUE: 2
PIF_VALUE: 13
PIF_VALUE: 13
PIF_VALUE: 15
PIF_VALUE: 3
PIF_VALUE: 0
PIF_VALUE: 10
PIF_VALUE: 1
PIF_VALUE: 3
PIF_VALUE: 16
PIF_VALUE: 1
PIF_VALUE: 14
PIF_VALUE: 10
PIF_VALUE: 0
PIF_VALUE: 1
PIF_VALUE: 3
PIF_VALUE: 14
PIF_VALUE: 14
PIF_VALUE: 15
PIF_VALUE: 13
PIF_VALUE: 1
PIF_VALUE: 14
PIF_VALUE: 15
PIF_VALUE: 13
PIF_VALUE: 2
PIF_VALUE: 1
PIF_VALUE: 15
PIF_VALUE: 14
PIF_VALUE: 15
PIF_VALUE: 14
PIF_VALUE: 22
PIF_VALUE: 8
PIF_VALUE: 14
PIF_VALUE: 2
PIF_VALUE: 15
PIF_VALUE: 3
PIF_VALUE: 15
PIF_VALUE: 14
PIF_VALUE: 2
PIF_VALUE: 3
PIF_VALUE: 14
PIF_VALUE: 15
PIF_VALUE: 6
PIF_VALUE: 14
PIF_VALUE: 16
PIF_VALUE: 14
PIF_VALUE: 1
PIF_VALUE: 3
PIF_VALUE: 15

## 2022-03-03 ASSESSMENT — PAIN SCALES - GENERAL
PAINLEVEL_OUTOF10: 9
PAINLEVEL_OUTOF10: 9
PAINLEVEL_OUTOF10: 5
PAINLEVEL_OUTOF10: 10
PAINLEVEL_OUTOF10: 7

## 2022-03-03 ASSESSMENT — ENCOUNTER SYMPTOMS
DIARRHEA: 0
ABDOMINAL DISTENTION: 0
CHEST TIGHTNESS: 0
COUGH: 0
COLOR CHANGE: 0
NAUSEA: 0
BACK PAIN: 0
BACK PAIN: 1
EYE DISCHARGE: 0
SORE THROAT: 0
EYE PAIN: 0
RHINORRHEA: 0
VOMITING: 0
ABDOMINAL PAIN: 0
SINUS PAIN: 0
EYE ITCHING: 0
CHOKING: 0
WHEEZING: 0
SHORTNESS OF BREATH: 0

## 2022-03-03 ASSESSMENT — LIFESTYLE VARIABLES: SMOKING_STATUS: 1

## 2022-03-03 ASSESSMENT — PAIN DESCRIPTION - LOCATION
LOCATION: HIP

## 2022-03-03 ASSESSMENT — PAIN DESCRIPTION - ORIENTATION
ORIENTATION: LEFT

## 2022-03-03 ASSESSMENT — PAIN DESCRIPTION - PAIN TYPE
TYPE: SURGICAL PAIN
TYPE: ACUTE PAIN

## 2022-03-03 ASSESSMENT — PAIN DESCRIPTION - DESCRIPTORS
DESCRIPTORS: ACHING
DESCRIPTORS: ACHING

## 2022-03-03 NOTE — ED TRIAGE NOTES
Pt to rm 21 per EMS c/o left sided hip and low back pain s/p mechanical fall at [employer]. Pt states she was reaching for something on a high shelf and lost he balance causing her to fall backwards landing on her backside. Pt denies hitting her head, no LOC, no thinners. On arrival pt is A&Ox3, medicated for pain PTA per EMS states pain w/movement 5/10. Denies other needs or concerns at this time.

## 2022-03-03 NOTE — ED PROVIDER NOTES
[facility] [address] COMPLAINT       Chief Complaint   Patient presents with    Hip Pain     left hip s/p mechanical fall        Nurses Notes reviewed and I agree except as notedin the HPI. HISTORY OF PRESENT ILLNESS    Guille Jones is a 66 y.o. female who presents tripped over her feet at [employer] and landed on her left hip and lower back. She states that she was able to walk afterwards. She is on any blood thinners. She does not remember hitting her head. She was given 8 mg of morphine per EMS    Location/Symptom: Left hip  Timing/Onset: just PTA  Context/Setting: [facility]  Quality: ache  Duration: constant  Modifying Factors: none  Severity: 10  REVIEW OF SYSTEMS     Review of Systems   Constitutional: Negative for activity change, appetite change, chills and fever. HENT: Negative for congestion, ear pain, rhinorrhea and sore throat. Eyes: Negative for pain, discharge and itching. Respiratory: Negative for cough, shortness of breath and wheezing. Cardiovascular: Negative for chest pain. Gastrointestinal: Negative for abdominal pain, diarrhea, nausea and vomiting. Genitourinary: Negative for difficulty urinating and dysuria. Musculoskeletal: Negative for arthralgias, back pain and myalgias. Left hip pain     Skin: Negative for color change and rash. Neurological: Negative for dizziness, seizures, light-headedness and headaches. Psychiatric/Behavioral: Negative for agitation, confusion, self-injury and suicidal ideas. All other systems reviewed and are negative. PAST MEDICAL HISTORY    has no past medical history on file. SURGICAL HISTORY      has a past surgical history that includes Hysterectomy. CURRENT MEDICATIONS       Previous Medications    IBUPROFEN (ADVIL;MOTRIN) 400 MG TABLET    Take 400 mg by mouth every 6 hours as needed for Pain       ALLERGIES     is allergic to pcn [penicillins].     HISTORY has no family status information on file. family history is not on file. SOCIALHISTORY      reports that she has been smoking cigarettes. She has been smoking about 1.00 pack per day. She has never used smokeless tobacco. She reports that she does not drink alcohol and does not use drugs. PHYSICAL EXAM     INITIAL VITALS:  height is 5' 2\" (1.575 m) and weight is 130 lb (59 kg). Her oral temperature is 98.7 °F (37.1 °C). Her blood pressure is 137/56 (abnormal) and her pulse is 88. Her respiration is 20 and oxygen saturation is 98%. Physical Exam  Vitals and nursing note reviewed. Constitutional:       Comments: Well Developed Well Nourished Appearing     HENT:      Head: Normocephalic and atraumatic. Eyes:      Pupils: Pupils are equal, round, and reactive to light. Cardiovascular:      Rate and Rhythm: Normal rate and regular rhythm. Heart sounds: Normal heart sounds. Pulmonary:      Effort: Pulmonary effort is normal. No respiratory distress. Breath sounds: Normal breath sounds. No wheezing. Abdominal:      General: Bowel sounds are normal. There is no distension. Palpations: Abdomen is soft. Musculoskeletal:      Cervical back: Normal range of motion and neck supple. Comments: Left hip did reveal marked tenderness with range of motion right hip is nontender with range of motion. Did have some pain in the midline of the lumbar's spine. Neurovascular intact lower extremities. DIFFERENTIAL DIAGNOSIS:   Fall mechanical left hip fracture possible lumbar spine fracture rule out any intracranial pathology. Her GCS was 15 revised trauma score was 12.     DIAGNOSTIC RESULTS     EKG: All EKG's are interpreted by the Emergency Department Physician who either signs or Co-signs this chart in the absence of a cardiologist.      RADIOLOGY: non-plain film images(s) such as CT, Ultrasound and MRI are read by the radiologist.  CT HEAD WO CONTRAST   Final Result      No mass effect or acute hemorrhage. **This report has been created using voice recognition software. It may contain minor errors which are inherent in voice recognition technology. **      Final report electronically signed by Dr. Fatmata Flaherty on 3/3/2022 4:37 PM      CT CERVICAL SPINE WO CONTRAST   Final Result   1. No fracture or spondylolisthesis of the cervical spine. 2. Multilevel degenerative disc disease, neural foraminal narrowing and central canal stenosis as detailed above. Final report electronically signed by Dr. Fatmata Flaherty on 3/3/2022 4:41 PM      XR CHEST PORTABLE   Final Result      No acute intrathoracic process. **This report has been created using voice recognition software. It may contain minor errors which are inherent in voice recognition technology. **      Final report electronically signed by Dr. Fatmata Flaherty on 3/3/2022 4:14 PM      XR HIP LEFT (2-3 VIEWS)   Final Result      Comminuted fracture of the proximal femur.       Final report electronically signed by Dr. Fatmata Flaherty on 3/3/2022 4:15 PM      CT LUMBAR SPINE WO CONTRAST    (Results Pending)         LABS:   Labs Reviewed   CBC WITH AUTO DIFFERENTIAL - Abnormal; Notable for the following components:       Result Value    MPV 9.0 (*)     All other components within normal limits   GLOMERULAR FILTRATION RATE, ESTIMATED - Abnormal; Notable for the following components:    Est, Glom Filt Rate 82 (*)     All other components within normal limits   OSMOLALITY - Abnormal; Notable for the following components:    Osmolality Calc 273.5 (*)     All other components within normal limits   PROTIME-INR   APTT   BASIC METABOLIC PANEL   MAGNESIUM   ANION GAP   TYPE AND SCREEN       EMERGENCY DEPARTMENT COURSE:   :    Vitals:    03/03/22 1528 03/03/22 1537 03/03/22 1645   BP: (!) 145/55  (!) 137/56   Pulse: 74 69 88   Resp: 16  20   Temp: 98.7 °F (37.1 °C)     TempSrc: Oral     SpO2: 98%  98%   Weight: 130 lb (59 kg) Height: 5' 2\" (1.575 m)       Patient was seen history physical exam was performed. Patient discussed with orthopedics. Patient excepted for further care management. I did discuss the case with Jamila Layton PA-C was accepted patient for admission. We have ordered a lumbar spine CT scan which will be followed. Case was staffed with Dr. Ashlie Hobbs see disposition below    CRITICAL CARE:  None    CONSULTS:  Jamila Layton PA-C    PROCEDURES:  None    FINAL IMPRESSION      1. Fall, initial encounter    2. Closed fracture of left hip, initial encounter ([facility] [address]          DISPOSITION/PLAN   Admit    PATIENT REFERRED TO:  No follow-up provider specified.     DISCHARGE MEDICATIONS:  New Prescriptions    No medications on file       (Please note that portions of this note were completed with a voice recognitionprogram.  Efforts were made to edit the dictations but occasionally words are mis-transcribed.)    CHAYO Rosen [place]  03/03/22 5011

## 2022-03-03 NOTE — H&P
Sammi Denise is an 66 y.o.  female who was at [facility] today and was reaching up for a toilet seat when she fell over and landed on her left side. She immediately had back and left hip pain and presented to the ED as she was unable to bear weight on her left leg. She denies distal paresthesias. Xray confirmed a left minimally displaced comminuted intertrochanteric fracture of the proximal femur. She does not take any home medications. History reviewed. No pertinent past medical history. PMH is positive for COPD, gout 30 years ago. Negative for chest pain, CVA, MI, angina, Asthma, ulcers, hx dvt, DM. Social history: PPD smoker of 0.5 ppd x 50 years. Social ETOH use. Allergies: Allergies   Allergen Reactions    Pcn [Penicillins] Shortness Of Breath     PSH: hysterectomy- pt denies anesthesia problems. Active Problems:    * No active hospital problems. *  Resolved Problems:    * No resolved hospital problems. *    Blood pressure (!) 137/56, pulse 88, temperature 98.7 °F (37.1 °C), temperature source Oral, resp. rate 20, height 5' 2\" (1.575 m), weight 130 lb (59 kg), SpO2 98 %. Review of Systems   Constitutional: Negative for chills, fatigue and fever. HENT: Negative for congestion, ear pain, sinus pain and sore throat. Eyes: Negative for pain. Respiratory: Negative for cough, choking, chest tightness and shortness of breath. Cardiovascular: Negative for chest pain, palpitations and leg swelling. Gastrointestinal: Negative for abdominal distention, abdominal pain, diarrhea, nausea and vomiting. Endocrine: Negative for polydipsia and polyuria. Genitourinary: Negative for dysuria and urgency. Musculoskeletal: Positive for back pain, gait problem and joint swelling. Skin: Negative for pallor and rash. Allergic/Immunologic: Negative for environmental allergies and food allergies. Neurological: Negative for dizziness, seizures, syncope, light-headedness and headaches. Psychiatric/Behavioral: Negative for confusion and hallucinations. Physical Exam  Constitutional:       General: She is not in acute distress. Appearance: Normal appearance. HENT:      Head: Normocephalic and atraumatic. Nose: Nose normal.      Mouth/Throat:      Mouth: Mucous membranes are moist.   Eyes:      Comments: PER   Cardiovascular:      Rate and Rhythm: Normal rate. Pulses: Normal pulses. Pulmonary:      Effort: Pulmonary effort is normal.   Abdominal:      General: Abdomen is flat. There is no distension. Palpations: Abdomen is soft. Tenderness: There is no abdominal tenderness. Musculoskeletal:         General: Swelling, tenderness, deformity and signs of injury present. Cervical back: Neck supple. Left lower leg: Edema present. Comments: LLE shortened and ER. Limited exam d/t known fracture. Skin:     General: Skin is warm and dry. Capillary Refill: Capillary refill takes less than 2 seconds. Findings: No erythema. Neurological:      General: No focal deficit present. Mental Status: She is alert and oriented to person, place, and time. Psychiatric:         Mood and Affect: Mood normal.         Assessment:  Left minimally displaced and comminuted intertrochanteric fracture of the proximal femur    Plan:  Left intertrochanteric fracture repair with Intertan system with Dr. Meghna Vergara, Discussed pre-intra-post operative recovery periods and expectations with patient. Risks, benefits and alternatives discussed. Pt and her  elected to proceed. All questions and concerns answered. NPO for surgery tonight. Pre-op antibiotics ordered. NWB until after surgery. Obtain consent- see order.      Raúl Paige PA-C  3/3/2022

## 2022-03-04 ENCOUNTER — APPOINTMENT (OUTPATIENT)
Dept: CT IMAGING | Age: 72
DRG: 480 | End: 2022-03-04
Payer: MEDICARE

## 2022-03-04 ENCOUNTER — APPOINTMENT (OUTPATIENT)
Dept: GENERAL RADIOLOGY | Age: 72
DRG: 480 | End: 2022-03-04
Payer: MEDICARE

## 2022-03-04 LAB
EKG ATRIAL RATE: 86 BPM
EKG P AXIS: 79 DEGREES
EKG P-R INTERVAL: 128 MS
EKG Q-T INTERVAL: 394 MS
EKG QRS DURATION: 92 MS
EKG QTC CALCULATION (BAZETT): 471 MS
EKG R AXIS: 68 DEGREES
EKG T AXIS: 72 DEGREES
EKG VENTRICULAR RATE: 86 BPM
ERYTHROCYTE [DISTWIDTH] IN BLOOD BY AUTOMATED COUNT: 13.4 % (ref 11.5–14.5)
ERYTHROCYTE [DISTWIDTH] IN BLOOD BY AUTOMATED COUNT: 45.6 FL (ref 35–45)
HCT VFR BLD CALC: 32 % (ref 37–47)
HEMOGLOBIN: 10.2 GM/DL (ref 12–16)
MCH RBC QN AUTO: 29.8 PG (ref 26–33)
MCHC RBC AUTO-ENTMCNC: 31.9 GM/DL (ref 32.2–35.5)
MCV RBC AUTO: 93.6 FL (ref 81–99)
PLATELET # BLD: 154 THOU/MM3 (ref 130–400)
PMV BLD AUTO: 9.1 FL (ref 9.4–12.4)
RBC # BLD: 3.42 MILL/MM3 (ref 4.2–5.4)
WBC # BLD: 7.4 THOU/MM3 (ref 4.8–10.8)

## 2022-03-04 PROCEDURE — 97166 OT EVAL MOD COMPLEX 45 MIN: CPT

## 2022-03-04 PROCEDURE — 6370000000 HC RX 637 (ALT 250 FOR IP): Performed by: PHYSICIAN ASSISTANT

## 2022-03-04 PROCEDURE — 97530 THERAPEUTIC ACTIVITIES: CPT

## 2022-03-04 PROCEDURE — 6370000000 HC RX 637 (ALT 250 FOR IP): Performed by: HOSPITALIST

## 2022-03-04 PROCEDURE — 36415 COLL VENOUS BLD VENIPUNCTURE: CPT

## 2022-03-04 PROCEDURE — 6360000002 HC RX W HCPCS: Performed by: ORTHOPAEDIC SURGERY

## 2022-03-04 PROCEDURE — 2580000003 HC RX 258: Performed by: PHYSICIAN ASSISTANT

## 2022-03-04 PROCEDURE — 6360000002 HC RX W HCPCS: Performed by: HOSPITALIST

## 2022-03-04 PROCEDURE — 97110 THERAPEUTIC EXERCISES: CPT

## 2022-03-04 PROCEDURE — 72100 X-RAY EXAM L-S SPINE 2/3 VWS: CPT

## 2022-03-04 PROCEDURE — 85027 COMPLETE CBC AUTOMATED: CPT

## 2022-03-04 PROCEDURE — 1200000000 HC SEMI PRIVATE

## 2022-03-04 PROCEDURE — 97535 SELF CARE MNGMENT TRAINING: CPT

## 2022-03-04 PROCEDURE — 6370000000 HC RX 637 (ALT 250 FOR IP): Performed by: ORTHOPAEDIC SURGERY

## 2022-03-04 PROCEDURE — 97162 PT EVAL MOD COMPLEX 30 MIN: CPT

## 2022-03-04 RX ORDER — DOCUSATE SODIUM 100 MG/1
100 CAPSULE, LIQUID FILLED ORAL 2 TIMES DAILY
Status: DISCONTINUED | OUTPATIENT
Start: 2022-03-04 | End: 2022-03-11 | Stop reason: HOSPADM

## 2022-03-04 RX ORDER — SENNA PLUS 8.6 MG/1
2 TABLET ORAL 2 TIMES DAILY
Status: DISCONTINUED | OUTPATIENT
Start: 2022-03-04 | End: 2022-03-11 | Stop reason: HOSPADM

## 2022-03-04 RX ORDER — CYCLOBENZAPRINE HCL 10 MG
10 TABLET ORAL 3 TIMES DAILY PRN
Status: DISCONTINUED | OUTPATIENT
Start: 2022-03-04 | End: 2022-03-11 | Stop reason: HOSPADM

## 2022-03-04 RX ORDER — HYDROCODONE BITARTRATE AND ACETAMINOPHEN 5; 325 MG/1; MG/1
2 TABLET ORAL EVERY 4 HOURS PRN
Status: DISCONTINUED | OUTPATIENT
Start: 2022-03-04 | End: 2022-03-11 | Stop reason: HOSPADM

## 2022-03-04 RX ORDER — BISACODYL 10 MG
10 SUPPOSITORY, RECTAL RECTAL DAILY PRN
Status: DISCONTINUED | OUTPATIENT
Start: 2022-03-04 | End: 2022-03-11 | Stop reason: HOSPADM

## 2022-03-04 RX ORDER — HYDROCODONE BITARTRATE AND ACETAMINOPHEN 5; 325 MG/1; MG/1
2 TABLET ORAL EVERY 6 HOURS PRN
Status: DISCONTINUED | OUTPATIENT
Start: 2022-03-04 | End: 2022-03-04

## 2022-03-04 RX ORDER — POLYETHYLENE GLYCOL 3350 17 G/17G
17 POWDER, FOR SOLUTION ORAL 2 TIMES DAILY
Status: DISCONTINUED | OUTPATIENT
Start: 2022-03-04 | End: 2022-03-11 | Stop reason: HOSPADM

## 2022-03-04 RX ADMIN — RIVAROXABAN 10 MG: 10 TABLET, FILM COATED ORAL at 17:44

## 2022-03-04 RX ADMIN — HYDROCODONE BITARTRATE AND ACETAMINOPHEN 2 TABLET: 5; 325 TABLET ORAL at 21:57

## 2022-03-04 RX ADMIN — SENNOSIDES 17.2 MG: 8.6 TABLET ORAL at 21:57

## 2022-03-04 RX ADMIN — DIPHENHYDRAMINE HYDROCHLORIDE 25 MG: 25 TABLET ORAL at 12:18

## 2022-03-04 RX ADMIN — SENNOSIDES 17.2 MG: 8.6 TABLET ORAL at 10:02

## 2022-03-04 RX ADMIN — HYDROCODONE BITARTRATE AND ACETAMINOPHEN 2 TABLET: 5; 325 TABLET ORAL at 07:39

## 2022-03-04 RX ADMIN — POLYETHYLENE GLYCOL 3350 17 G: 17 POWDER, FOR SOLUTION ORAL at 21:57

## 2022-03-04 RX ADMIN — POLYETHYLENE GLYCOL 3350 17 G: 17 POWDER, FOR SOLUTION ORAL at 09:01

## 2022-03-04 RX ADMIN — MAGNESIUM HYDROXIDE 30 ML: 2400 SUSPENSION ORAL at 09:01

## 2022-03-04 RX ADMIN — DIPHENHYDRAMINE HYDROCHLORIDE 25 MG: 50 INJECTION, SOLUTION INTRAMUSCULAR; INTRAVENOUS at 03:23

## 2022-03-04 RX ADMIN — HYDROCODONE BITARTRATE AND ACETAMINOPHEN 2 TABLET: 5; 325 TABLET ORAL at 12:12

## 2022-03-04 RX ADMIN — CYCLOBENZAPRINE 10 MG: 10 TABLET, FILM COATED ORAL at 10:02

## 2022-03-04 RX ADMIN — HYDROMORPHONE HYDROCHLORIDE 1 MG: 1 INJECTION, SOLUTION INTRAMUSCULAR; INTRAVENOUS; SUBCUTANEOUS at 01:16

## 2022-03-04 RX ADMIN — SODIUM CHLORIDE, POTASSIUM CHLORIDE, SODIUM LACTATE AND CALCIUM CHLORIDE: 600; 310; 30; 20 INJECTION, SOLUTION INTRAVENOUS at 12:17

## 2022-03-04 RX ADMIN — MORPHINE SULFATE 2 MG: 2 INJECTION, SOLUTION INTRAMUSCULAR; INTRAVENOUS at 03:03

## 2022-03-04 RX ADMIN — DOCUSATE SODIUM 100 MG: 100 CAPSULE, LIQUID FILLED ORAL at 21:57

## 2022-03-04 RX ADMIN — CYCLOBENZAPRINE 10 MG: 10 TABLET, FILM COATED ORAL at 21:57

## 2022-03-04 RX ADMIN — DOCUSATE SODIUM 100 MG: 100 CAPSULE, LIQUID FILLED ORAL at 09:01

## 2022-03-04 RX ADMIN — ACETAMINOPHEN 650 MG: 325 TABLET ORAL at 17:44

## 2022-03-04 ASSESSMENT — PAIN DESCRIPTION - LOCATION
LOCATION: HIP;LEG
LOCATION: HIP
LOCATION: HIP

## 2022-03-04 ASSESSMENT — PAIN DESCRIPTION - PROGRESSION
CLINICAL_PROGRESSION: GRADUALLY IMPROVING
CLINICAL_PROGRESSION: GRADUALLY WORSENING
CLINICAL_PROGRESSION: GRADUALLY IMPROVING
CLINICAL_PROGRESSION: GRADUALLY WORSENING
CLINICAL_PROGRESSION: GRADUALLY WORSENING
CLINICAL_PROGRESSION: GRADUALLY IMPROVING
CLINICAL_PROGRESSION: GRADUALLY IMPROVING

## 2022-03-04 ASSESSMENT — PAIN SCALES - GENERAL
PAINLEVEL_OUTOF10: 10
PAINLEVEL_OUTOF10: 5
PAINLEVEL_OUTOF10: 10
PAINLEVEL_OUTOF10: 6
PAINLEVEL_OUTOF10: 5
PAINLEVEL_OUTOF10: 7
PAINLEVEL_OUTOF10: 10
PAINLEVEL_OUTOF10: 8
PAINLEVEL_OUTOF10: 10
PAINLEVEL_OUTOF10: 5
PAINLEVEL_OUTOF10: 10
PAINLEVEL_OUTOF10: 10
PAINLEVEL_OUTOF10: 8
PAINLEVEL_OUTOF10: 10
PAINLEVEL_OUTOF10: 10
PAINLEVEL_OUTOF10: 5

## 2022-03-04 ASSESSMENT — PAIN - FUNCTIONAL ASSESSMENT
PAIN_FUNCTIONAL_ASSESSMENT: PREVENTS OR INTERFERES SOME ACTIVE ACTIVITIES AND ADLS
PAIN_FUNCTIONAL_ASSESSMENT: INTOLERABLE, UNABLE TO DO ANY ACTIVE OR PASSIVE ACTIVITIES

## 2022-03-04 ASSESSMENT — PAIN DESCRIPTION - FREQUENCY
FREQUENCY: CONTINUOUS
FREQUENCY: CONTINUOUS

## 2022-03-04 ASSESSMENT — PAIN DESCRIPTION - ONSET: ONSET: ON-GOING

## 2022-03-04 ASSESSMENT — PAIN DESCRIPTION - PAIN TYPE
TYPE: SURGICAL PAIN

## 2022-03-04 ASSESSMENT — PAIN DESCRIPTION - ORIENTATION
ORIENTATION: LEFT

## 2022-03-04 ASSESSMENT — PAIN DESCRIPTION - DESCRIPTORS
DESCRIPTORS: THROBBING
DESCRIPTORS: THROBBING

## 2022-03-04 ASSESSMENT — PAIN DESCRIPTION - DIRECTION: RADIATING_TOWARDS: DOWN LEG

## 2022-03-04 NOTE — OP NOTE
Operative Note  [facility]  Operative Note    Patient Name:  Pastor Ramos  MRN:  [id number] YOB: 1950  Admission Date:  3/3/2022    Surgery Date:  3/3/2022    Pre-operative Diagnosis: Left  subtrochanteric Fracture    Post-operative Diagnosis: Same    Procedure: Open treatment of a subtrochanteric fracture with a cephamedullary nail 51627    Surgeon: Carmelo Grover MD    Assistants:   [] Jaqueline Holloway PA-C   [x] Grace Larson PA-C      Anesthesia: General    Estimated Blood Loss:  330HR    Complications:  None    Specimens: none    Components Used: Smith&nephew Intertan      INDICATION FOR PROCEDURE     The patient is an 66y.o.-year-old with a history of a fall. Patient complained of Left  hip pain. The patient has multiple medical comorbidities and was admitted with an intertrochanteric hip fracture. Patient was evaluated medically and felt patient to be of acceptable risk. Discussed with patient and elected to proceed. NARRATIVE:     PROCEDURE DETAILS     The patient was taken to the operating room, underwent a general anesthetic. The fractured side was placed in longitudinal traction to aid in the reduction of the fracture. The other leg was draped out of the field. Timeout was taken, consent was confirmed. The patient received 2 gram of Ancef preoperatively. Started with 3.2 mm guide pin on the medial face of the greater trochanter and was then advanced under fluoroscopic guidance. The incision size was increased. A 16 mm channel reamer was then passed down to the level of the lesser trochanter. We placed the  Intertan nail down to the appropriate depth. We used the jig and a 3.2 mm guide pin placed in the center of the femoral head. We then used a dimpler followed by a cannulated reamer,the rigid reamer and the anti-rotation bar. We placed a lag screw, compression screw. Good compression noted across the fracture site.  Traction was removed and then placed distal locking screws with a free-handed technique under fluoroscopic guidance. We injected local anesthetic including morphine and Toradol. Wounds were irrigated and closed with 2-0 Vicryl and Prineo. The patient was then awakened and returned to recovery room in fair condition. POSTOPERATIVE PLAN: Weight bearing as tolerated, dry dressings p.r.n. First postop visit will be in 8 weeks x-rays, 2 views of the Right femur . The physician assistant assisted throughout the procedure with positioning, draping, retraction, wound closure, and dressings application.       Sonam Medina MD             Electronically signed by Sonam Medina MD on 3/3/2022 at 9:47 PM

## 2022-03-04 NOTE — CARE COORDINATION
3/4/22, 7:45 AM EST  DISCHARGE PLANNING EVALUATION:    Rosalino Bond       Admitted: 3/3/2022/ [facility] day: 1   Location: [facility] Reason for admit: Closed fracture of left hip, initial encounter ([facility] [address] Claude Galeana  Fall, initial encounter [W19. XXXA]  Intertrochanteric fracture of left femur, closed, initial encounter ([facility] [address] Gerda Freeman   PMH:  has no past medical history on file. To ED was at [facility] today and was reaching up for a toilet seat when she fell over and landed on her left side. Procedure: 3/3 Open treatment of a subtrochanteric fracture with a cephamedullary nail  by Dr Tamez Enter    CT Lumbar spine pending (unable to complete today)    Barriers to Discharge:  POD #1, PT/OT evals, dressing care, pain control. Aquino care. PCP: Dennis Rucker MD  Readmission Risk Score: 10.6 ( )%    Patient Goals/Plan/Treatment Preferences: Met with Helga Hodge and her daughter Yumiko Kendrick (from [place]) plus sister Jacy Thrasher. Patient was in chair and kept eyes closed throughout the conversation. Staff used Christiano Kent for pt to get to chair/anxious and in pain. Pt normally drives and was independent pta. She will need RW at time of discharge per daughter. Daughter requests IP rehab as first choice for rehab otherwise home with [facility]. They had bad experience at SNF in past.  Informed daughter she will need to participate/complete 3 hrs IP therapy each day. Will follow therapy notes.  has had stroke and has some rt sided weakness per daughter. Transportation/Food Security/Housekeeping Addressed:  No issues identified.

## 2022-03-04 NOTE — PROGRESS NOTES
Department of Orthopedic Surgery  Attending Progress Note      SUBJECTIVE Pt is POD #1 s/p left intertrochanteric femur fx repair with Intertan. Pt denies F/NS/C, chest pain, SOB, N/V/D. Denies flatulence. No new complaints. OBJECTIVE    Pt resting in bed, NAD noted. 2+ DP/PT pulses B/L. B/L SCDs noted to LE. Negative felipa's sign, no tenderness to calf palpation. Dressings to left LE dry and intact. Aquino patent. Abdomen soft, NTND. A&Ox3. Physical    VITALS:  BP (!) 97/46   Pulse 67   Temp 98.6 °F (37 °C) (Oral)   Resp 18   Ht 5' 2\" (1.575 m)   Wt 130 lb (59 kg)   SpO2 97%   BMI 23.78 kg/m²   MUSCULOSKELETAL: Neurovascularly intact. Dressing dry    ASSESSMENT AND PLAN      POD #1 s/p left intertrochanteric femur fx repair with Intertan. WBAT with assistive device. Flexeril added for muscle spasms. Norco for pain control. Xarelto for DVT prophylaxis. Encouraged patient and explained need for therapy. Pt still wishes to go home. Will continue to monitor. All questions and concerns answered.      Taylor Vicente PA-C

## 2022-03-04 NOTE — PROGRESS NOTES
height  Bathroom Equipment: Grab bars in shower,Shower chair  Bathroom Accessibility: Accessible       ADL Assistance: [address]: Independent  Homemaking Responsibilities: Yes  Ambulation Assistance: Independent  Transfer Assistance: Independent    Active : Yes     Additional Comments: Pt amb without AD    VISION:WFL    HEARING:  WFL    COGNITION: Slow Processing, Decreased Recall, Decreased Insight, Decreased Problem Solving and Decreased Safety Awareness    RANGE OF MOTION:  Bilateral Upper Extremity:  WFL    STRENGTH:  Bilateral Upper Extremity:  Impaired - Grossly 4/5    SENSATION:   WFL    ADL:   Lower Extremity Dressing: Dependent. B socks. Monica Colder BALANCE:  Sitting Balance:  Stand By Assistance. seated EOB x 10 minutes. Demonstrates R lateral lean d/t pain. Standing Balance: Minimal Assistance, X 2. x 3 trials(1 with walker and 2 with robinson kent) for ~2 minutes with BUE supported     BED MOBILITY:  Supine to Sit: Maximum Assistance, X 2, with head of bed raised, with rail, with increased time for completion    Scooting: Maximum Assistance, with increased time for completion      TRANSFERS:  Sit to Stand: Moderate Assistance, X 2, with Sylvia Founds x 1 trial and RW x 1 trial, with increased time for completion. from EOB. Stand to Sit: Minimal Assistance, X 2, with Sylvia Founds, with increased time for completion, cues for hand placement. FUNCTIONAL MOBILITY:  OTR to assess. Used robinson kent    Activity Tolerance:  Patient tolerance of  treatment: good. Assessment:  Assessment: This 66year old female is s/p fall resulting in L femur ORIF. Pt required +2 assist with all bed mobility, and transfers. Pt unable to ambulate at this time. Pt requires skilled OT intervention to increase indep and endurance with all self cares, transfers, mobility, and IADLs to return to Indep PLOF.  Without skilled OT intervention pt is at increased risk for falls and caregiver goal 2: Pt will complete standing tolerance x 4 minutes with min A +1 and min vcs for safety to increase indep with sinkside grooming. Short term goal 3: Pt will complete LB dressing with LHAE PRN and Mod A to increase indep within home environment. Short term goal 4: Pt will complete sit to stands transfers with min A +1 and min vcs for safety to increase indep with toileting. Short term goal 5: OTR to assess functional mobility. Following session, patient left in safe position with all fall risk precautions in place.

## 2022-03-04 NOTE — ANESTHESIA PRE PROCEDURE
Department of Anesthesiology  Preprocedure Note       Name:  David Perez   Age:  66 y.o.  :  1950                                          MRN:  [id number]         Date:  3/3/2022      Surgeon: Rakel Rodney):  Sowmya Recinos MD    Procedure: Procedure(s):  LEFT FEMUR ORIF INTERTAN    Medications prior to admission:   Prior to Admission medications    Medication Sig Start Date End Date Taking? Authorizing Provider   ibuprofen (ADVIL;MOTRIN) 400 MG tablet Take 400 mg by mouth every 6 hours as needed for Pain    Historical Provider, MD       Current medications:    Current Facility-Administered Medications   Medication Dose Route Frequency Provider Last Rate Last Admin    morphine (PF) injection 2 mg  2 mg IntraVENous Q2H PRN Delicia Abhi PA-C   2 mg at 22     Current Outpatient Medications   Medication Sig Dispense Refill    ibuprofen (ADVIL;MOTRIN) 400 MG tablet Take 400 mg by mouth every 6 hours as needed for Pain         Allergies: Allergies   Allergen Reactions    Pcn [Penicillins] Shortness Of Breath       Problem List:  There is no problem list on file for this patient. Past Medical History:  History reviewed. No pertinent past medical history. Past Surgical History:        Procedure Laterality Date    HYSTERECTOMY         Social History:    Social History     Tobacco Use    Smoking status: Current Every Day Smoker     Packs/day: 1.00     Types: Cigarettes    Smokeless tobacco: Never Used   Substance Use Topics    Alcohol use:  No                                Ready to quit: Not Answered  Counseling given: Not Answered      Vital Signs (Current):   Vitals:    22   BP: (!) 114/48      Pulse: 78 72 76    Resp:       Temp:    97.6 °F (36.4 °C)   TempSrc:    Temporal   SpO2: 98% 96% 95%    Weight:       Height:                                                  BP Readings from Last 3 Encounters:   22 (!) 114/48 04/23/20 (!) 142/59   10/18/18 132/62       NPO Status:                                                                                 BMI:   Wt Readings from Last 3 Encounters:   03/03/22 130 lb (59 kg)   04/23/20 135 lb (61.2 kg)   10/18/18 135 lb (61.2 kg)     Body mass index is 23.78 kg/m². CBC:   Lab Results   Component Value Date    WBC 8.2 03/03/2022    RBC 4.74 03/03/2022    HGB 13.9 03/03/2022    HCT 43.0 03/03/2022    MCV 90.7 03/03/2022    RDW 13.5 08/25/2014     03/03/2022       CMP:   Lab Results   Component Value Date     03/03/2022    K 4.1 03/03/2022     03/03/2022    CO2 23 03/03/2022    BUN 17 03/03/2022    CREATININE 0.7 03/03/2022    LABGLOM 82 03/03/2022    GLUCOSE 99 03/03/2022    PROT 7.1 08/04/2018    CALCIUM 9.3 03/03/2022    BILITOT 0.2 08/04/2018    ALKPHOS 73 08/04/2018    AST 19 08/04/2018    ALT 18 08/04/2018       POC Tests: No results for input(s): POCGLU, POCNA, POCK, POCCL, POCBUN, POCHEMO, POCHCT in the last 72 hours. Coags:   Lab Results   Component Value Date    INR 1.02 03/03/2022    APTT 28.0 03/03/2022       HCG (If Applicable): No results found for: PREGTESTUR, PREGSERUM, HCG, HCGQUANT     ABGs: No results found for: PHART, PO2ART, FTS1LYG, JYM2BBG, BEART, I0SAXFBU     Type & Screen (If Applicable):  Lab Results   Component Value Date    LABRH NEG 03/03/2022       Drug/Infectious Status (If Applicable):  No results found for: HIV, HEPCAB    COVID-19 Screening (If Applicable): No results found for: COVID19        Anesthesia Evaluation    Airway: Mallampati: II  TM distance: >3 FB   Neck ROM: full  Mouth opening: > = 3 FB Dental:          Pulmonary:   (+) COPD:  decreased breath sounds,  current smoker                           Cardiovascular:            Rhythm: regular                      Neuro/Psych:               GI/Hepatic/Renal:             Endo/Other:                     Abdominal:             Vascular:           Other Findings: Anesthesia Plan      general     ASA 3       Induction: intravenous. MIPS: Postoperative opioids intended and Prophylactic antiemetics administered. Anesthetic plan and risks discussed with patient and sibling. Plan discussed with CRNA.                   Catrachito Paul MD   3/3/2022

## 2022-03-04 NOTE — ED NOTES
ED nurse-to-nurse bedside report    Chief Complaint   Patient presents with    Hip Pain     left hip s/p mechanical fall       LOC: alert and orientated to name, place, date  Vital signs   Vitals:    03/03/22 1528 03/03/22 1537 03/03/22 1645 03/03/22 1741   BP: (!) 145/55  (!) 137/56 136/62   Pulse: 74 69 88 83   Resp: 16  20 16   Temp: 98.7 °F (37.1 °C)      TempSrc: Oral      SpO2: 98%  98% 98%   Weight: 130 lb (59 kg)      Height: 5' 2\" (1.575 m)         Pain:    Pain Interventions: Morphine Q2hrs  Pain Goal:   Oxygen: NA    Current needs required waiting surgery and IP bed placement   Telemetry: Yes  LDAs:   Peripheral IV 03/03/22 Left Antecubital (Active)   Site Assessment Clean;Dry; Intact 03/03/22 1535   Line Status Flushed 03/03/22 1535   Dressing Status Clean;Dry; Intact 03/03/22 1535   Dressing Intervention New 03/03/22 1535     Continuous Infusions:   Mobility: bedrest  Velázquez Fall Risk Score: No flowsheet data found.   Fall Interventions:   Report given to: Shauna Hoffmann RN  03/03/22 4096

## 2022-03-04 NOTE — H&P
[address] [address]  History and Physical Update    Pt Name: Rupa Luna  MRN: [id number]  YOB: 1950  Date of evaluation: 3/3/2022    [x] I have examined the patient and reviewed the H&P/Consult and there are no changes to the patient or plans.     [] I have examined the patient and reviewed the H&P/Consult and have noted the following changes:        Melony Arredondo MD  Electronically signed 3/3/2022 at 9:47 PM

## 2022-03-04 NOTE — PROGRESS NOTES
Pt admitted to  7K24 - S/P Left femur ORIF  Complaints: Pain    IV left ac with NS infusing at 50ml hr. Changed fluid to LR at 100 hr as per orders. IV site free of s/s of infection or infiltration. Vital signs obtained. Assessment and data collection initiated. All vitals WNL except for pain control. Contacted Dr. Alyx Haji for increased pain medication. Pain is rated 5 out of 10 as of 0200. Full skin assessment not completed as per patient comfort. Will reassess once patient's pain is better controlled  Explained patients right to have family, representative or physician notified of their admission. Patient has declined for physician to be notified. Patient has declined for family/representative to be notified. The patient is interested in [facility]. [facility] meds to beds program?:  Unknown at this time. Policies and procedures for  explained. All questions answered with no further questions at this time. Fall prevention and safety brochure discussed with patient. Bed alarm on. Call light in reach. Spoke to patient's daughter \"Nerissa\" and gave update. Will continue to monitor.

## 2022-03-04 NOTE — PROGRESS NOTES
[facility]  INPATIENT PHYSICAL THERAPY  DAILY NOTE  [facility] OR[facility] 7K - 9S-38/624-U    Time In: 8319  Time Out: 1450  Timed Code Treatment Minutes: 23 Minutes  Minutes: 23      **Co-tx with OT due to pt requiring +2 skilled assist    Date: 3/4/2022  Patient Name: Mark Anthony Bearden,  Gender:  female        MRN: [id number]  : 1950  (66 y.o.)     Referring Practitioner: José Luis Alvarez MD  Diagnosis: Closed fracture of left hip  Additional Pertinent Hx: Mark Anthony Bearden is an 66 y.o.  female who was at [facility] today and was reaching up for a toilet seat when she fell over and landed on her left side. She immediately had back and left hip pain and presented to the ED as she was unable to bear weight on her left leg. She denies distal paresthesias. Xray confirmed a left minimally displaced comminuted intertrochanteric fracture of the proximal femur. Pt is s/p L hip ORIF 3/3 by Dr Nga Acosta. Prior Level of Function:  Lives With: Spouse  Type of Home: House  Home Layout: One level  Home Access: Stairs to enter without rails  Entrance Stairs - Number of Steps: 1  Home Equipment: Rolling walker   Bathroom Shower/Tub: Walk-in shower (3in threshold into shower)  Bathroom Toilet: Handicap height  Bathroom Equipment: Grab bars in shower,Shower chair  Bathroom Accessibility: Accessible    ADL Assistance: [address] [place] [address] Avenue: Independent  Homemaking Responsibilities: Yes  Ambulation Assistance: Independent  Transfer Assistance: Independent  Active : Yes  Additional Comments: Pt amb without AD    Restrictions/Precautions:  Restrictions/Precautions: Weight Bearing,Fall Risk  Left Lower Extremity Weight Bearing: Weight Bearing As Tolerated     SUBJECTIVE: Returned in PM for OOB mobility, OT present with pt sitting EOB.       PAIN: 10/10    Vitals: Vitals not assessed per clinical judgement, see nursing flowsheet    OBJECTIVE:  Bed Mobility:  Not Tested (already sitting EOB upon arrival, per OT, pt was max A x 2 to transfer supine to sit)    Transfers:  Sit to Stand: Moderate Assistance, X 2, with RW first trial, 2 trials in robinson kent, pt ascends slowly, constant cues for knee extension and hip extension  Stand to Sit:Moderate Assistance, X 2  To/From Bed and Chair: with Simona Man    Ambulation:  Unable    Balance:  Static Sitting Balance:  Stand By Assistance  Static Standing Balance: Minimal Assistance, X 2; pt able to achieve full stand with RW, stands ~2 minutes, pt attempting to weight shift to step, pt unable, cues for upright posture and to push down through walker    Functional Outcome Measures: Completed  AM-PAC Inpatient Mobility Raw Score : 6  AM-PAC Inpatient T-Scale Score : 23.55    ASSESSMENT:  Assessment: Patient progressing toward established goals. Activity Tolerance:  Patient tolerance of  treatment: fair. Equipment Recommendations: Other: will monitor needs pending progress  Discharge Recommendations: Subacte/Skilled Nursing Facility  Plan: Times per week: 6x O  Current Treatment Recommendations: Strengthening,Home Exercise Program,Safety Education & DARREN Montemayor Education & Training,Functional Mobility Training,Balance Training,Transfer Training    Patient Education  Patient Education: Transfers, Gait    Goals:  Patient goals : to get better  Short term goals  Time Frame for Short term goals: by discharge  Short term goal 1: Pt to transfer supine <--> sit min A to enable pt to get in/out of bed. Short term goal 2: Pt to transfer sit <--> stand CGA for increased functional mobility. Short term goal 3: PT to assess gait. Long term goals  Time Frame for Long term goals : NA due to short length of stay. Following session, patient left in safe position with all fall risk precautions in place.

## 2022-03-04 NOTE — PLAN OF CARE
Problem: Falls - Risk of:  Goal: Will remain free from falls  Description: Will remain free from falls  Outcome: Ongoing  Note: Pt using call light appropriately to call for assistance with ambulation to the bathroom and to chair. Pt is also compliant with use of non-skid slippers. Pt reports understanding of fall prevention when discussed. Problem: Falls - Risk of:  Goal: Absence of physical injury  Description: Absence of physical injury  Outcome: Ongoing  Note: Pt using call light appropriately to call for assistance with ambulation to the bathroom and to chair. Pt is also compliant with use of non-skid slippers. Pt reports understanding of fall prevention when discussed. Problem: Skin Integrity:  Goal: Will show no infection signs and symptoms  Description: Will show no infection signs and symptoms  Outcome: Ongoing  Note: Pt's surgical incision healing. Dressing is dry and intact and not due to be changed today. No other skin impairments noted. Pt understands the importance of frequent repositioning in order to prevent any skin breakdown. Problem: Skin Integrity:  Goal: Absence of new skin breakdown  Description: Absence of new skin breakdown  Outcome: Ongoing  Note: Pt's surgical incision healing. Dressing is dry and intact and not due to be changed today. No other skin impairments noted. Pt understands the importance of frequent repositioning in order to prevent any skin breakdown. Care plan reviewed with patient and /sister. Patient and /sister verbalize understanding of the plan of care and contribute to goal setting.

## 2022-03-04 NOTE — PROGRESS NOTES
Pt attempted to go to CT today for CT lumbar ordered from ER. Pt unable to tolerate being moved from bed to ct table. CHAYO Reynoso given update and advised that pt have lumbar xrays and retry Ct on 3/5. If pt continues to be unable to tolerated movement for ct scan on 3/5, cancel CT at that time.

## 2022-03-04 NOTE — PROGRESS NOTES
[address] [address]  INPATIENT PHYSICAL THERAPY  EVALUATION  [facility] ORTHOPEDICS 7K - 0K-95/351-G    Time In: 5271  Time Out: 5231  Timed Code Treatment Minutes: 27 Minutes  Minutes: 44          Date: 3/4/2022  Patient Name: Araceli Hinds,  Gender:  female        MRN: [id number]  : 1950  (66 y.o.)      Referring Practitioner: Messi Tinoco MD  Diagnosis: Closed fracture of left hip  Additional Pertinent Hx: Araceli Hinds is an 66 y.o.  female who was at [facility] today and was reaching up for a toilet seat when she fell over and landed on her left side. She immediately had back and left hip pain and presented to the ED as she was unable to bear weight on her left leg. She denies distal paresthesias. Xray confirmed a left minimally displaced comminuted intertrochanteric fracture of the proximal femur. Pt is s/p L hip ORIF 3/3 by Dr Ricardo Hunter. Restrictions/Precautions:  Restrictions/Precautions: Weight Bearing,Fall Risk  Left Lower Extremity Weight Bearing: Weight Bearing As Tolerated    Subjective:  Chart Reviewed: Yes  Patient assessed for rehabilitation services?: Yes  Family / Caregiver Present: No  Subjective: RN approved session, pt is supine in bed, sleepy, on 1 L O2. Pt very anxious and in extreme pain, received Norco just prior to session. Limited activity due to pain, will re-attempt EOB tolerance later this afternoon. Sister present residential through encouraging pt. Pt confused, not oriented to place, sister confirms PLOF information.     General:  Overall Orientation Status: Impaired  Orientation Level: Oriented to person,Disoriented to place,Disoriented to situation,Disoriented to time    Vision: Within Functional Limits    Hearing: Within functional limits       Pain: 10/10    Vitals: Vitals not assessed per clinical judgement, see nursing flowsheet    Social/Functional History:    Lives With: Spouse  Type of Home: House  Home Layout: One level  Home Access: Stairs to enter without endurance,Increased pain,Decreased strength,Decreased cognition  Assessment: Pt tolerates session fair-, limited by post-op pain, anxious and tearful with AAROM. Will attempt EOB tolerance later this afternoon. PT to continue to progress strength and functional mobility. Prognosis: Good    REQUIRES PT FOLLOW UP: Yes    Discharge Recommendations:  Discharge Recommendations: Continue to assess pending progress    Patient Education:  PT Education: PT Jaspreet Cordero of Care    Equipment Recommendations: Other: will monitor needs pending progress    Plan:  Times per week: 6x O  Current Treatment Recommendations: Strengthening,Home Exercise Program,Safety Education & HFloyd Montemayor Education & Training    Goals:  Patient goals : to get better  Short term goals  Time Frame for Short term goals: by discharge  Short term goal 1: Pt to participate in LE AROM exercises to increase strength for improved mobility. Short term goal 2: PT to assess mobility. Long term goals  Time Frame for Long term goals : NA due to short length of stay. Following session, patient left in safe position with all fall risk precautions in place.

## 2022-03-05 ENCOUNTER — APPOINTMENT (OUTPATIENT)
Dept: CT IMAGING | Age: 72
DRG: 480 | End: 2022-03-05
Payer: MEDICARE

## 2022-03-05 ENCOUNTER — APPOINTMENT (OUTPATIENT)
Dept: INTERVENTIONAL RADIOLOGY/VASCULAR | Age: 72
DRG: 480 | End: 2022-03-05
Payer: MEDICARE

## 2022-03-05 LAB
ANION GAP SERPL CALCULATED.3IONS-SCNC: 8 MEQ/L (ref 8–16)
BUN BLDV-MCNC: 11 MG/DL (ref 7–22)
CALCIUM SERPL-MCNC: 8.4 MG/DL (ref 8.5–10.5)
CHLORIDE BLD-SCNC: 104 MEQ/L (ref 98–111)
CO2: 24 MEQ/L (ref 23–33)
CREAT SERPL-MCNC: 0.6 MG/DL (ref 0.4–1.2)
D-DIMER QUANTITATIVE: 5189 NG/ML FEU (ref 0–500)
ERYTHROCYTE [DISTWIDTH] IN BLOOD BY AUTOMATED COUNT: 13.3 % (ref 11.5–14.5)
ERYTHROCYTE [DISTWIDTH] IN BLOOD BY AUTOMATED COUNT: 45.3 FL (ref 35–45)
GFR SERPL CREATININE-BSD FRML MDRD: > 90 ML/MIN/1.73M2
GLUCOSE BLD-MCNC: 95 MG/DL (ref 70–108)
HCT VFR BLD CALC: 30.7 % (ref 37–47)
HEMOGLOBIN: 9.8 GM/DL (ref 12–16)
MCH RBC QN AUTO: 29.4 PG (ref 26–33)
MCHC RBC AUTO-ENTMCNC: 31.9 GM/DL (ref 32.2–35.5)
MCV RBC AUTO: 92.2 FL (ref 81–99)
PLATELET # BLD: 128 THOU/MM3 (ref 130–400)
PMV BLD AUTO: 9.2 FL (ref 9.4–12.4)
POTASSIUM REFLEX MAGNESIUM: 4.4 MEQ/L (ref 3.5–5.2)
RBC # BLD: 3.33 MILL/MM3 (ref 4.2–5.4)
SODIUM BLD-SCNC: 136 MEQ/L (ref 135–145)
WBC # BLD: 6.9 THOU/MM3 (ref 4.8–10.8)

## 2022-03-05 PROCEDURE — 85027 COMPLETE CBC AUTOMATED: CPT

## 2022-03-05 PROCEDURE — 72131 CT LUMBAR SPINE W/O DYE: CPT

## 2022-03-05 PROCEDURE — 97530 THERAPEUTIC ACTIVITIES: CPT

## 2022-03-05 PROCEDURE — 6370000000 HC RX 637 (ALT 250 FOR IP): Performed by: PHYSICIAN ASSISTANT

## 2022-03-05 PROCEDURE — 71275 CT ANGIOGRAPHY CHEST: CPT

## 2022-03-05 PROCEDURE — 6360000004 HC RX CONTRAST MEDICATION: Performed by: HOSPITALIST

## 2022-03-05 PROCEDURE — 1200000000 HC SEMI PRIVATE

## 2022-03-05 PROCEDURE — 80048 BASIC METABOLIC PNL TOTAL CA: CPT

## 2022-03-05 PROCEDURE — 99223 1ST HOSP IP/OBS HIGH 75: CPT | Performed by: HOSPITALIST

## 2022-03-05 PROCEDURE — L0450 TLSO FLEX TRUNK/THOR PRE OTS: HCPCS

## 2022-03-05 PROCEDURE — 36415 COLL VENOUS BLD VENIPUNCTURE: CPT

## 2022-03-05 PROCEDURE — 2580000003 HC RX 258: Performed by: ORTHOPAEDIC SURGERY

## 2022-03-05 PROCEDURE — 6370000000 HC RX 637 (ALT 250 FOR IP): Performed by: ORTHOPAEDIC SURGERY

## 2022-03-05 PROCEDURE — 2580000003 HC RX 258: Performed by: PHYSICIAN ASSISTANT

## 2022-03-05 PROCEDURE — 85379 FIBRIN DEGRADATION QUANT: CPT

## 2022-03-05 PROCEDURE — 93970 EXTREMITY STUDY: CPT

## 2022-03-05 RX ADMIN — HYDROCODONE BITARTRATE AND ACETAMINOPHEN 2 TABLET: 5; 325 TABLET ORAL at 12:01

## 2022-03-05 RX ADMIN — CYCLOBENZAPRINE 10 MG: 10 TABLET, FILM COATED ORAL at 20:48

## 2022-03-05 RX ADMIN — DOCUSATE SODIUM 100 MG: 100 CAPSULE, LIQUID FILLED ORAL at 20:48

## 2022-03-05 RX ADMIN — SENNOSIDES 17.2 MG: 8.6 TABLET ORAL at 08:02

## 2022-03-05 RX ADMIN — SODIUM CHLORIDE, PRESERVATIVE FREE 10 ML: 5 INJECTION INTRAVENOUS at 20:48

## 2022-03-05 RX ADMIN — SODIUM CHLORIDE, POTASSIUM CHLORIDE, SODIUM LACTATE AND CALCIUM CHLORIDE: 600; 310; 30; 20 INJECTION, SOLUTION INTRAVENOUS at 00:16

## 2022-03-05 RX ADMIN — POLYETHYLENE GLYCOL 3350 17 G: 17 POWDER, FOR SOLUTION ORAL at 08:02

## 2022-03-05 RX ADMIN — IOPAMIDOL 80 ML: 755 INJECTION, SOLUTION INTRAVENOUS at 09:22

## 2022-03-05 RX ADMIN — DOCUSATE SODIUM 100 MG: 100 CAPSULE, LIQUID FILLED ORAL at 08:02

## 2022-03-05 RX ADMIN — HYDROCODONE BITARTRATE AND ACETAMINOPHEN 1 TABLET: 5; 325 TABLET ORAL at 02:09

## 2022-03-05 RX ADMIN — RIVAROXABAN 10 MG: 10 TABLET, FILM COATED ORAL at 18:03

## 2022-03-05 RX ADMIN — HYDROCODONE BITARTRATE AND ACETAMINOPHEN 1 TABLET: 5; 325 TABLET ORAL at 16:12

## 2022-03-05 RX ADMIN — HYDROCODONE BITARTRATE AND ACETAMINOPHEN 1 TABLET: 5; 325 TABLET ORAL at 08:02

## 2022-03-05 RX ADMIN — SODIUM CHLORIDE, PRESERVATIVE FREE 10 ML: 5 INJECTION INTRAVENOUS at 08:03

## 2022-03-05 RX ADMIN — POLYETHYLENE GLYCOL 3350 17 G: 17 POWDER, FOR SOLUTION ORAL at 20:48

## 2022-03-05 RX ADMIN — SENNOSIDES 17.2 MG: 8.6 TABLET ORAL at 20:48

## 2022-03-05 RX ADMIN — HYDROCODONE BITARTRATE AND ACETAMINOPHEN 2 TABLET: 5; 325 TABLET ORAL at 20:48

## 2022-03-05 ASSESSMENT — PAIN DESCRIPTION - ORIENTATION
ORIENTATION: LEFT

## 2022-03-05 ASSESSMENT — PAIN DESCRIPTION - ONSET
ONSET: ON-GOING

## 2022-03-05 ASSESSMENT — PAIN SCALES - GENERAL
PAINLEVEL_OUTOF10: 6
PAINLEVEL_OUTOF10: 0
PAINLEVEL_OUTOF10: 9
PAINLEVEL_OUTOF10: 5
PAINLEVEL_OUTOF10: 6
PAINLEVEL_OUTOF10: 5
PAINLEVEL_OUTOF10: 9
PAINLEVEL_OUTOF10: 8

## 2022-03-05 ASSESSMENT — PAIN DESCRIPTION - PAIN TYPE
TYPE: ACUTE PAIN;SURGICAL PAIN
TYPE: SURGICAL PAIN

## 2022-03-05 ASSESSMENT — PAIN DESCRIPTION - PROGRESSION
CLINICAL_PROGRESSION: GRADUALLY WORSENING
CLINICAL_PROGRESSION: NOT CHANGED
CLINICAL_PROGRESSION: GRADUALLY IMPROVING
CLINICAL_PROGRESSION: NOT CHANGED
CLINICAL_PROGRESSION: NOT CHANGED

## 2022-03-05 ASSESSMENT — PAIN DESCRIPTION - LOCATION
LOCATION: LEG
LOCATION: LEG
LOCATION: HIP;LEG
LOCATION: LEG

## 2022-03-05 ASSESSMENT — PAIN DESCRIPTION - DIRECTION: RADIATING_TOWARDS: DOWN LEG

## 2022-03-05 ASSESSMENT — PAIN DESCRIPTION - DESCRIPTORS
DESCRIPTORS: ACHING

## 2022-03-05 ASSESSMENT — PAIN DESCRIPTION - FREQUENCY
FREQUENCY: CONTINUOUS

## 2022-03-05 ASSESSMENT — PAIN - FUNCTIONAL ASSESSMENT
PAIN_FUNCTIONAL_ASSESSMENT: ACTIVITIES ARE NOT PREVENTED
PAIN_FUNCTIONAL_ASSESSMENT: PREVENTS OR INTERFERES SOME ACTIVE ACTIVITIES AND ADLS
PAIN_FUNCTIONAL_ASSESSMENT: ACTIVITIES ARE NOT PREVENTED

## 2022-03-05 NOTE — PLAN OF CARE
asleep. Pt states oral medication helping to achieve pain goal of a 3 on scale. Non-Pharmaceutical Pain Intervention(s): Rest,Repositioned,Cold applied      Problem: Pain:  Goal: Control of acute pain  Description: Control of acute pain  Outcome: Ongoing  Note: Acute surgical left leg pain noted. Pain Assessment: 0-10  Pain Level: 0   Patient's Stated Pain Goal: 3   Is pain goal met at this time? Yes- patient able to fall asleep. Pt states oral medication helping to achieve pain goal of a 3 on scale. Non-Pharmaceutical Pain Intervention(s): Rest,Repositioned,Cold applied      Problem: Pain:  Goal: Control of chronic pain  Description: Control of chronic pain  Outcome: Ongoing  Note: Patient has chronic back pain at times. Denies at this time. Problem: Activity:  Goal: Ability to ambulate will improve  Description: Ability to ambulate will improve  Outcome: Ongoing  Note: Patient is up with 2 assist with sarasteady. Patient can be weight bearing as tolerated. PT/OT on case. Problem: Nutritional:  Goal: Ability to attain and maintain optimal nutritional status will improve  Description: Ability to attain and maintain optimal nutritional status will improve  Outcome: Ongoing  Note: Patient is regular diet and tolerating without nausea. Poor PO intake noted. Encouraged PO intake. Problem: Respiratory:  Goal: Ability to maintain adequate ventilation will improve  Description: Ability to maintain adequate ventilation will improve  Outcome: Ongoing  Note: Patient has adequate ventilation however, complains of SOB. New onset hypoxia noted. 2L via nasal cannula applied. Encouraged IS. Problem: Self-Care:  Goal: Ability to meet self-care needs will improve  Description: Ability to meet self-care needs will improve  Outcome: Ongoing  Note: Patient able to verbalize needs. Assisting as they arise. Patient is able to be active in some activities of daily living. Will monitor.        Problem: Urinary Elimination:  Goal: Ability to reestablish a normal urinary elimination pattern will improve - after catheter removal  Description: Ability to reestablish a normal urinary elimination pattern will improve  Outcome: Ongoing  Note: Patient has echevarria catheter in place. Naa urine output noted. Adequate amount. Problem: GI  Goal: No bowel complications  Outcome: Ongoing  Note: Pt with hypoactive bowel sounds, not passing flatus, and without nausea. Taking prescribed medications to assist with BM. No BM since surgery. Problem: Discharge Planning:  Goal: Discharged to appropriate level of care  Description: Discharged to appropriate level of care  Outcome: Ongoing  Note: Patient is requesting IP rehab or home with [facility]. Patient may require ECF due to poor mobility and note being able to tolerate 3 hours of therapy. Patient is currently a 2 assist with robinson-steady. PT/OT notes needed.  and  workings towards discharge. Problem: Physical Regulation:  Goal: Will remain free from infection  Description: Will remain free from infection  Outcome: Not Met This Shift  Note: Signs of infection noted. Patient was febrile and has new onset hypoxia. CHAYO Yan and Dr. Elsa Sharma notified. Fever resolved with ice and medication administration. Encouraged IS. Dressing clean,dry and intact. Problem: Cardiovascular  Goal: No DVT, peripheral vascular complications  Outcome: Not Met This Shift  Note: Pt with bilateral calf \"tightness\" and complains of SOB. Both s/s of DVT. Pt has SCD,S in place to help prevent development of DVT. Theo Yan and Dr. Elsa Sharma notified of bilateral calf pain, SOB and new onset hypoxia. Care plan reviewed with patient. Patient verbalize understanding of the plan of care and contribute to goal setting.

## 2022-03-05 NOTE — PROGRESS NOTES
Discussed CT lumbar with Renee Delgado PAC. She will discuss with Marika Moore. Ok to proceed with therapy.

## 2022-03-05 NOTE — PROGRESS NOTES
mechanical fall. CT scan ordered of lumbar spine. Further recommendations will be made pending results. Encouraged therapy exercises. Awaiting recommendations for discharge home vs nursing home. D/C wale noguera.      Maury Kapoor PA-C  3/5/2022

## 2022-03-05 NOTE — CONSULTS
Department of Internal Medicine  General Internal Medicine  Attending History and Physical      Reason for consult: SOB    History Obtained From:  Patient    HISTORY OF PRESENT ILLNESS:      The patient is a 66 y.o. female with significant past medical history of osteoarthritis is admitted for left femur fracture POD #1 s/p left intertrochanteric femur fx repair with Intertan. She tripped over her feet at work [employer] and landed on her left hip and lower back. She states that she was able to walk afterwards. She is NOT on any blood thinners. She does not remember hitting her head. Surgery was uncomplicated. She developed sudden onset of SOB and bilateral calf pain. No cough. No other symptoms    Past Medical History:    History reviewed. No pertinent past medical history. Past Surgical History:        Procedure Laterality Date    HIP FRACTURE SURGERY Left 3/3/2022    LEFT FEMUR ORIF INTERTAN performed by Celena Suarez MD at [address] [place] [address]         Medications Prior to Admission:    Medications Prior to Admission: ibuprofen (ADVIL;MOTRIN) 400 MG tablet, Take 400 mg by mouth every 6 hours as needed for Pain    Allergies:  Pcn [penicillins]    Family History:   History reviewed. No pertinent family history. REVIEW OF SYSTEMS:  10/10 systems reviewed and are negative.     PHYSICAL EXAM:  Vitals:  BP (!) 113/55   Pulse 73   Temp 98.9 °F (37.2 °C) (Oral)   Resp 16   Ht 5' 2\" (1.575 m)   Wt 130 lb (59 kg)   SpO2 91%   BMI 23.78 kg/m²   General Appearance:    Alert, cooperative, no distress, appears stated age   Head:    Normocephalic, without obvious abnormality, atraumatic   Eyes:    PERRL, conjunctiva/corneas clear, EOM's intact, fundi     benign, both eyes   Ears:    Normal TM's and external ear canals, both ears   Nose:   Nares normal, septum midline, mucosa normal, no drainage    or sinus tenderness   Lungs:     Crackle at base, mild wheezing   Abdomen:    +B/S, soft   Extremities:   Extremities

## 2022-03-05 NOTE — PROGRESS NOTES
[address] [facility]  Occupational Therapy  Daily Note  Time:   Time In: 1348  Time Out: 1418  Timed Code Treatment Minutes: 30 Minutes  Minutes: 30   Co-tx completed with PT due to requiring skilled assist x2. Date: 3/5/2022  Patient Name: Shivani Pollard,   Gender: female      Room: [facility]24/024-A  MRN: [id number]  : 1950  (66 y.o.)  Referring Practitioner: Mercedes Watkins MD  Diagnosis: Closed Fracture of left Hip  Additional Pertinent Hx: Shivani Pollard is an 66 y.o.  female who was at [facility] today and was reaching up for a toilet seat when she fell over and landed on her left side. She immediately had back and left hip pain and presented to the ED as she was unable to bear weight on her left leg. She denies distal paresthesias. Xray confirmed a left minimally displaced comminuted intertrochanteric fracture of the proximal femur. Pt is s/p L hip ORIF 3/3 by Dr Celia Dial. Restrictions/Precautions:  Restrictions/Precautions: Weight Bearing,Fall Risk  Left Lower Extremity Weight Bearing: Weight Bearing As Tolerated     SUBJECTIVE: RN approved OT session, pleasant and cooperative throughout. Family members present providing encouragement. JULIAN Mendoza reporting pt will need LSO, but has permission from physician for OOB activity prior to brace arrival.    PAIN: 10: L hip    Vitals: Vitals not assessed per clinical judgement, see nursing flowsheet    COGNITION: WFL    ADL:   No ADL's completed this session. Kenia Emerson BALANCE:  Sitting Balance:  Stand By Assistance, [employer]. due to pt sitting very close to EOB  Standing Balance: Contact Guard Assistance, X 2. pt tolerated standing for approx 10 minutes due to increased time to complete transfer    BED MOBILITY:  Supine to Sit: Moderate Assistance, X 2, with head of bed raised, with verbal cues , with increased time for completion with assist for support of LLE and at shoulders    TRANSFERS:  Sit to Stand:   Moderate Assistance, X 2, with increased time for completion, cues for hand placement. from EOB  Stand to Sit: Minimal Assistance, X 2, with increased time for completion, cues for hand placement, to/from chair with arms. with assist for controlled descent  To/From Bed and Chair: Contact Guard Assistance, X 2, with increased time for completion, with assist increasing to Mod x 2. Pt unable to  feet to take steps, but does scoot BLEs. Patient required max verbal cues for WBing through BUEs into RW. FUNCTIONAL MOBILITY:  Unable to demonstrate with difficulty weight bearing into LLE due to pain. Does demonstrate ability to  LLE, however unable to complete with RLE. ASSESSMENT:     Activity Tolerance:  Patient tolerance of  treatment: good. Pt requires increased time due to anxiety and pain, but is motivated to get up to chair. Discharge Recommendations: Continue to assess pending progress, Inpatient Rehabilitation and Patient would benefit from continued OT at discharge  Equipment Recommendations: Equipment Needed: Yes  Other: RW. monitor need for Darylene Pheasant. Plan: Times per week: 6x  Current Treatment Recommendations: Strengthening,Balance Training,Functional Mobility Training,Endurance Training,Self-Care / ADL,Safety Education & Training,Patient/Caregiver Education & Training,Equipment Evaluation, Education, & procurement,Home Management Training    Patient Education  Patient Education: Role of OT, Plan of Care and Importance of Increasing Activity, transfer training    Goals  Short term goals  Time Frame for Short term goals: Until discharge  Short term goal 1: Pt will complete BUE light resistive exercises with min vcs for technique to increase indep and safety with all self cares. Short term goal 2: Pt will complete standing tolerance x 4 minutes with min A +1 and min vcs for safety to increase indep with sinkside grooming.   Short term goal 3: Pt will complete LB dressing with LHAE PRN and Mod A to increase indep within home environment. Short term goal 4: Pt will complete sit to stands transfers with min A +1 and min vcs for safety to increase indep with toileting. Short term goal 5: OTR to assess functional mobility. Following session, patient left in safe position with all fall risk precautions in place.

## 2022-03-05 NOTE — PROGRESS NOTES
[address] [address]  INPATIENT PHYSICAL THERAPY  DAILY NOTE  [facility] 7K - 2H-89/795-B    Time In: 1360  Time Out: 1418  Timed Code Treatment Minutes: 30 Minutes  Minutes: 30   *co-tx with OT due to 2+assist        Date: 3/5/2022  Patient Name: Sammi Denise,  Gender:  female        MRN: [id number]  : 1950  (66 y.o.)     Referring Practitioner: Raymundo Baxter MD  Diagnosis: Closed fracture of left hip  Additional Pertinent Hx: Sammi Denise is an 66 y.o.  female who was at [facility] today and was reaching up for a toilet seat when she fell over and landed on her left side. She immediately had back and left hip pain and presented to the ED as she was unable to bear weight on her left leg. She denies distal paresthesias. Xray confirmed a left minimally displaced comminuted intertrochanteric fracture of the proximal femur. Pt is s/p L hip ORIF 3/3 by Dr Jhoan Greenwood. Prior Level of Function:  Lives With: Spouse  Type of Home: House  Home Layout: One level  Home Access: Stairs to enter without rails  Entrance Stairs - Number of Steps: 1  Home Equipment: Rolling walker   Bathroom Shower/Tub: Walk-in shower (3in threshold into shower)  Bathroom Toilet: Handicap height  Bathroom Equipment: Grab bars in shower,Shower chair  Bathroom Accessibility: Accessible    ADL Assistance: [address] [place] [address] Avenue: Independent  Homemaking Responsibilities: Yes  Ambulation Assistance: Independent  Transfer Assistance: Independent  Active : Yes  Additional Comments: Pt amb without AD    Restrictions/Precautions:  Restrictions/Precautions: Weight Bearing,Fall Risk  Left Lower Extremity Weight Bearing: Weight Bearing As Tolerated     SUBJECTIVE: RN approved session. Pt pleasant and agreeable to therapy. Pt request that we move slow cause \"fast, jerky movements are what hurt\". mauricio JORDAN reports pt is supposed to have LSO but has permission from physician to continue with therapy without brace. PAIN: 7/10: L hip    Vitals: Vitals not assessed per clinical judgement, see nursing flowsheet    OBJECTIVE:  Bed Mobility:  Supine to Sit: Moderate Assistance, X 2, with head of bed raised, with verbal cues , with increased time for completion    Transfers:  Sit to Stand: Moderate Assistance, X 2, with increased time for completion, cues for hand placement  Stand to Sit:Minimal Assistance, X 2, cues for hand placement  To/From Bed and Chair: Ranging from [address] x2 to Mod x 2   *Maximal verbal cues throughout transfers and increased time required. Pt went ~4' from EOB to chair, scooting BLE but unable to pick feet up and take actual steps. Ambulation:  *Pt able to  LLE and march in place but unable to clear RLE. Pt is able to lift RLE heel up with no significant buckling of LLE. Maximal verbal cues for placing weight through BUE and walker. Balance:  Static Sitting Balance:  Stand By Assistance, Contact Guard Assistance  Static Standing Balance: Contact Guard Assistance, X 2   *pt standing for ~ 10  Min d/t increased time to complete transfer     Exercise:  Patient was educated on exercises to complete while laying in bed/seated in chair. Functional Outcome Measures: Completed  AM-PAC Inpatient Mobility Raw Score : 6  AM-PAC Inpatient T-Scale Score : 23.55    ASSESSMENT:  Assessment: Patient progressing toward established goals. Activity Tolerance:  Patient tolerance of  treatment: good. Pt with some increased anxiety requiring increased time to complete all tasks. Equipment Recommendations: Other: will monitor needs pending progress  Discharge Recommendations: Continue to assess pending progress and Inpatient Rehabilitation  Plan: Times per week: 6x O  Current Treatment Recommendations: Strengthening,Home Exercise Program,Safety Education & DARREN Montemayor Education & Training,Functional Mobility Training,Balance Training,Transfer Training    Patient Education  Patient Education: Plan of Care, Home Exercise Program, Precautions/Restrictions, Bed Mobility, Transfers    Goals:  Patient goals : to get better  Short term goals  Time Frame for Short term goals: by discharge  Short term goal 1: Pt to transfer supine <--> sit min A to enable pt to get in/out of bed. Short term goal 2: Pt to transfer sit <--> stand CGA for increased functional mobility. Short term goal 3: bed<>chair with LRD CGA x 1 for safe transfers  Short term goal 4: PT to assess gait  Long term goals  Time Frame for Long term goals : NA due to short length of stay. Following session, patient left in safe position with all fall risk precautions in place.     Carlos Alberto Randle PT, DPT

## 2022-03-06 LAB
ANION GAP SERPL CALCULATED.3IONS-SCNC: 12 MEQ/L (ref 8–16)
BACTERIA: ABNORMAL
BILIRUBIN URINE: NEGATIVE
BLOOD, URINE: ABNORMAL
BUN BLDV-MCNC: 10 MG/DL (ref 7–22)
CALCIUM SERPL-MCNC: 8.8 MG/DL (ref 8.5–10.5)
CASTS: ABNORMAL /LPF
CHARACTER, URINE: ABNORMAL
CHLORIDE BLD-SCNC: 98 MEQ/L (ref 98–111)
CO2: 23 MEQ/L (ref 23–33)
COLOR: YELLOW
CREAT SERPL-MCNC: 0.7 MG/DL (ref 0.4–1.2)
CRYSTALS: ABNORMAL
EPITHELIAL CELLS, UA: ABNORMAL /HPF
ERYTHROCYTE [DISTWIDTH] IN BLOOD BY AUTOMATED COUNT: 13.2 % (ref 11.5–14.5)
ERYTHROCYTE [DISTWIDTH] IN BLOOD BY AUTOMATED COUNT: 44.9 FL (ref 35–45)
GFR SERPL CREATININE-BSD FRML MDRD: 82 ML/MIN/1.73M2
GLUCOSE BLD-MCNC: 112 MG/DL (ref 70–108)
GLUCOSE, URINE: NEGATIVE MG/DL
HCT VFR BLD CALC: 31.8 % (ref 37–47)
HEMOGLOBIN: 10.2 GM/DL (ref 12–16)
KETONES, URINE: NEGATIVE
LEUKOCYTE ESTERASE, URINE: ABNORMAL
MCH RBC QN AUTO: 29.7 PG (ref 26–33)
MCHC RBC AUTO-ENTMCNC: 32.1 GM/DL (ref 32.2–35.5)
MCV RBC AUTO: 92.4 FL (ref 81–99)
NITRITE, URINE: NEGATIVE
PH UA: 5.5 (ref 5–9)
PLATELET # BLD: 175 THOU/MM3 (ref 130–400)
PMV BLD AUTO: 9.1 FL (ref 9.4–12.4)
POTASSIUM REFLEX MAGNESIUM: 4.1 MEQ/L (ref 3.5–5.2)
PROTEIN UA: ABNORMAL MG/DL
RBC # BLD: 3.44 MILL/MM3 (ref 4.2–5.4)
RBC URINE: ABNORMAL /HPF
SODIUM BLD-SCNC: 133 MEQ/L (ref 135–145)
SPECIFIC GRAVITY UA: 1.01 (ref 1–1.03)
UROBILINOGEN, URINE: 0.2 EU/DL (ref 0–1)
WBC # BLD: 9.5 THOU/MM3 (ref 4.8–10.8)
WBC UA: ABNORMAL /HPF
YEAST: ABNORMAL

## 2022-03-06 PROCEDURE — 80048 BASIC METABOLIC PNL TOTAL CA: CPT

## 2022-03-06 PROCEDURE — 6370000000 HC RX 637 (ALT 250 FOR IP): Performed by: ORTHOPAEDIC SURGERY

## 2022-03-06 PROCEDURE — 87077 CULTURE AEROBIC IDENTIFY: CPT

## 2022-03-06 PROCEDURE — 85027 COMPLETE CBC AUTOMATED: CPT

## 2022-03-06 PROCEDURE — 6370000000 HC RX 637 (ALT 250 FOR IP): Performed by: PHYSICIAN ASSISTANT

## 2022-03-06 PROCEDURE — 2580000003 HC RX 258: Performed by: PHYSICIAN ASSISTANT

## 2022-03-06 PROCEDURE — 81001 URINALYSIS AUTO W/SCOPE: CPT

## 2022-03-06 PROCEDURE — 6370000000 HC RX 637 (ALT 250 FOR IP): Performed by: HOSPITALIST

## 2022-03-06 PROCEDURE — 87086 URINE CULTURE/COLONY COUNT: CPT

## 2022-03-06 PROCEDURE — 99233 SBSQ HOSP IP/OBS HIGH 50: CPT | Performed by: PHYSICIAN ASSISTANT

## 2022-03-06 PROCEDURE — 87186 SC STD MICRODIL/AGAR DIL: CPT

## 2022-03-06 PROCEDURE — 36415 COLL VENOUS BLD VENIPUNCTURE: CPT

## 2022-03-06 PROCEDURE — 1200000000 HC SEMI PRIVATE

## 2022-03-06 PROCEDURE — 51798 US URINE CAPACITY MEASURE: CPT

## 2022-03-06 RX ORDER — CALCIUM CARBONATE 200(500)MG
1000 TABLET,CHEWABLE ORAL 3 TIMES DAILY PRN
Status: DISCONTINUED | OUTPATIENT
Start: 2022-03-06 | End: 2022-03-11 | Stop reason: HOSPADM

## 2022-03-06 RX ORDER — IODINE/SODIUM IODIDE 2 %
TINCTURE TOPICAL 4 TIMES DAILY PRN
Status: DISCONTINUED | OUTPATIENT
Start: 2022-03-06 | End: 2022-03-11 | Stop reason: HOSPADM

## 2022-03-06 RX ORDER — SODIUM CHLORIDE 9 MG/ML
INJECTION, SOLUTION INTRAVENOUS CONTINUOUS
Status: DISCONTINUED | OUTPATIENT
Start: 2022-03-06 | End: 2022-03-07

## 2022-03-06 RX ORDER — DIPHENHYDRAMINE HYDROCHLORIDE, ZINC ACETATE 2; .1 G/100G; G/100G
CREAM TOPICAL 3 TIMES DAILY PRN
Status: DISCONTINUED | OUTPATIENT
Start: 2022-03-06 | End: 2022-03-11 | Stop reason: HOSPADM

## 2022-03-06 RX ADMIN — SODIUM CHLORIDE: 9 INJECTION, SOLUTION INTRAVENOUS at 07:44

## 2022-03-06 RX ADMIN — DOCUSATE SODIUM 100 MG: 100 CAPSULE, LIQUID FILLED ORAL at 20:17

## 2022-03-06 RX ADMIN — SODIUM CHLORIDE: 9 INJECTION, SOLUTION INTRAVENOUS at 22:30

## 2022-03-06 RX ADMIN — SENNOSIDES 17.2 MG: 8.6 TABLET ORAL at 08:40

## 2022-03-06 RX ADMIN — POLYETHYLENE GLYCOL 3350 17 G: 17 POWDER, FOR SOLUTION ORAL at 20:17

## 2022-03-06 RX ADMIN — RIVAROXABAN 10 MG: 10 TABLET, FILM COATED ORAL at 18:20

## 2022-03-06 RX ADMIN — HYDROCODONE BITARTRATE AND ACETAMINOPHEN 2 TABLET: 5; 325 TABLET ORAL at 14:05

## 2022-03-06 RX ADMIN — ANTACID TABLETS 1000 MG: 500 TABLET, CHEWABLE ORAL at 12:12

## 2022-03-06 RX ADMIN — HYDROCODONE BITARTRATE AND ACETAMINOPHEN 2 TABLET: 5; 325 TABLET ORAL at 18:21

## 2022-03-06 RX ADMIN — HYDROCODONE BITARTRATE AND ACETAMINOPHEN 2 TABLET: 5; 325 TABLET ORAL at 07:38

## 2022-03-06 RX ADMIN — HYDROCODONE BITARTRATE AND ACETAMINOPHEN 2 TABLET: 5; 325 TABLET ORAL at 22:39

## 2022-03-06 RX ADMIN — ACETAMINOPHEN 650 MG: 325 TABLET ORAL at 11:51

## 2022-03-06 RX ADMIN — ACETAMINOPHEN 650 MG: 325 TABLET ORAL at 04:20

## 2022-03-06 RX ADMIN — POLYETHYLENE GLYCOL 3350 17 G: 17 POWDER, FOR SOLUTION ORAL at 08:40

## 2022-03-06 RX ADMIN — SENNOSIDES 17.2 MG: 8.6 TABLET ORAL at 20:11

## 2022-03-06 RX ADMIN — DOCUSATE SODIUM 100 MG: 100 CAPSULE, LIQUID FILLED ORAL at 08:40

## 2022-03-06 RX ADMIN — CYCLOBENZAPRINE 10 MG: 10 TABLET, FILM COATED ORAL at 21:29

## 2022-03-06 ASSESSMENT — PAIN DESCRIPTION - PROGRESSION
CLINICAL_PROGRESSION: NOT CHANGED

## 2022-03-06 ASSESSMENT — PAIN DESCRIPTION - ORIENTATION
ORIENTATION: LEFT

## 2022-03-06 ASSESSMENT — PAIN SCALES - GENERAL
PAINLEVEL_OUTOF10: 5
PAINLEVEL_OUTOF10: 0
PAINLEVEL_OUTOF10: 8
PAINLEVEL_OUTOF10: 9
PAINLEVEL_OUTOF10: 10
PAINLEVEL_OUTOF10: 9
PAINLEVEL_OUTOF10: 1
PAINLEVEL_OUTOF10: 0
PAINLEVEL_OUTOF10: 7
PAINLEVEL_OUTOF10: 8
PAINLEVEL_OUTOF10: 9
PAINLEVEL_OUTOF10: 3
PAINLEVEL_OUTOF10: 7

## 2022-03-06 ASSESSMENT — PAIN DESCRIPTION - LOCATION
LOCATION: LEG

## 2022-03-06 ASSESSMENT — PAIN - FUNCTIONAL ASSESSMENT
PAIN_FUNCTIONAL_ASSESSMENT: PREVENTS OR INTERFERES SOME ACTIVE ACTIVITIES AND ADLS

## 2022-03-06 ASSESSMENT — PAIN DESCRIPTION - ONSET
ONSET: ON-GOING

## 2022-03-06 ASSESSMENT — PAIN DESCRIPTION - FREQUENCY
FREQUENCY: CONTINUOUS

## 2022-03-06 ASSESSMENT — PAIN DESCRIPTION - PAIN TYPE
TYPE: SURGICAL PAIN

## 2022-03-06 ASSESSMENT — PAIN DESCRIPTION - DESCRIPTORS
DESCRIPTORS: ACHING

## 2022-03-06 NOTE — CONSULTS
Inpatient Consultation    Pastor Ramos ([birth year])  3/5/2022    Reason for Consult:  L4 VCF  Requesting Physician: CHAYO Ramirez    CHIEF COMPLAINT:  Hip pain s/p fall    History Obtained From:  Patient and EMR    HISTORY OF PRESENT ILLNESS:                The patient is a 66 y.o. female who presents with above chief complaint. Patient presented to the ED on 3/3 with complaints of back pain and left hip pain after falling at [place] while reaching up over her head for a toilet seat. She landed on her left side, diagnosed with a left minimally displaced comminuted fracture of proximal femur. She is now s/p left intertrochanteric femur fx repair with Intertan with Dr. Scott Mir. She c/o persistent back pain. She denies having any significant radicular complaints or numbness/tingling in the legs. She c/o left hip pain. She was able to sit up in chair today, wore back brace which she feels helped with her pain. Symptoms are worse with movement, improved with laying in bed. Pain currently controlled with medications. CT scan shows a L4 mild VCF and degenerative L4-5 spondylolisthesis. She has a history of a L4-5 laminectomy with Dr. Janina Wagoner a couple of years ago    Past Medical History:    History reviewed. No pertinent past medical history.   Past Surgical History:        Procedure Laterality Date    HIP FRACTURE SURGERY Left 3/3/2022    LEFT FEMUR ORIF INTERTAN performed by Carmelo Grover MD at [address] [place] [address]       Current Medications:   Current Facility-Administered Medications: HYDROmorphone (DILAUDID) injection 1 mg, 1 mg, IntraVENous, Q3H PRN  senna (SENOKOT) tablet 17.2 mg, 2 tablet, Oral, BID  docusate sodium (COLACE) capsule 100 mg, 100 mg, Oral, BID  magnesium hydroxide (MILK OF MAGNESIA) 400 MG/5ML suspension 30 mL, 30 mL, Oral, BID PRN  polyethylene glycol (GLYCOLAX) packet 17 g, 17 g, Oral, BID  bisacodyl (DULCOLAX) suppository 10 mg, 10 mg, Rectal, Daily PRN  cyclobenzaprine (FLEXERIL) tablet 10 mg, 10 mg, Oral, TID PRN  HYDROcodone-acetaminophen (NORCO) 5-325 MG per tablet 2 tablet, 2 tablet, Oral, Q4H PRN  sodium chloride flush 0.9 % injection 5-40 mL, 5-40 mL, IntraVENous, 2 times per day  sodium chloride flush 0.9 % injection 5-40 mL, 5-40 mL, IntraVENous, PRN  0.9 % sodium chloride infusion, 25 mL, IntraVENous, PRN  acetaminophen (TYLENOL) tablet 650 mg, 650 mg, Oral, Q6H  diphenhydrAMINE (BENADRYL) tablet 25 mg, 25 mg, Oral, Q6H PRN **OR** diphenhydrAMINE (BENADRYL) injection 25 mg, 25 mg, IntraVENous, Q6H PRN  ondansetron (ZOFRAN-ODT) disintegrating tablet 4 mg, 4 mg, Oral, Q8H PRN **OR** ondansetron (ZOFRAN) injection 4 mg, 4 mg, IntraVENous, Q6H PRN  rivaroxaban (XARELTO) tablet 10 mg, 10 mg, Oral, Daily  HYDROcodone-acetaminophen (NORCO) 5-325 MG per tablet 1 tablet, 1 tablet, Oral, Q4H PRN  morphine (PF) injection 2 mg, 2 mg, IntraVENous, Q2H PRN  Allergies:  Pcn [penicillins]    Social History:   TOBACCO:   reports that she has been smoking cigarettes. She has been smoking about 1.00 pack per day. She has never used smokeless tobacco.  ETOH:   reports no history of alcohol use. DRUGS:   reports no history of drug use. Family History:   History reviewed. No pertinent family history. REVIEW OF SYSTEMS:    CONSTITUTIONAL:  negative for  fevers, chills  RESPIRATORY:  negative for cough and shortness of breath  CARDIOVASCULAR:  negative for chest pain and palpitations  GASTROINTESTINAL:  negative for nausea, vomiting, bowel incontinence  GENITOURINARY:  negative for frequency and urinary incontinence  MUSCULOSKELETAL:  (+) back pain, hip pain  NEUROLOGICAL:  negative for headaches, syncope  BEHAVIOR/PSYCH:  negative for depressed mood, anxiety    PHYSICAL EXAM:      CONSTITUTIONAL:  awake, alert, cooperative, no apparent distress, and appears stated age  HEAD: atraumatic, normocephalic  LUNGS:  No respiratory distress.  CTA bilaterally per H&P  CARDIOVASCULAR:  RRR, no murmur per H&P  ABDOMEN:  Soft, non-distended, non-tender  MUSCULOSKELETAL:  5/5 bilateral pedal push/pull. TTP midline low back. NEUROLOGIC:  Awake, alert, oriented to name, place and time. Sensory intact bilateral LE    DATA:    CBC:   Lab Results   Component Value Date    WBC 6.9 03/05/2022    RBC 3.33 03/05/2022    HGB 9.8 03/05/2022    HCT 30.7 03/05/2022    MCV 92.2 03/05/2022    MCH 29.4 03/05/2022    MCHC 31.9 03/05/2022    RDW 13.5 08/25/2014     03/05/2022    MPV 9.2 03/05/2022     WBC:    Lab Results   Component Value Date    WBC 6.9 03/05/2022     Hemoglobin/Hematocrit:    Lab Results   Component Value Date    HGB 9.8 03/05/2022    HCT 30.7 03/05/2022     CMP:    Lab Results   Component Value Date     03/05/2022    K 4.4 03/05/2022     03/05/2022    CO2 24 03/05/2022    BUN 11 03/05/2022    CREATININE 0.6 03/05/2022    LABGLOM >90 03/05/2022    GLUCOSE 95 03/05/2022    PROT 7.1 08/04/2018    LABALBU 4.2 08/04/2018    CALCIUM 8.4 03/05/2022    BILITOT 0.2 08/04/2018    ALKPHOS 73 08/04/2018    AST 19 08/04/2018    ALT 18 08/04/2018         Radiology:   CT Lumbar Spine     Impression   Mild acute compression deformity of L4 with approximately 15% central height loss.             IMPRESSION/RECOMMENDATIONS:    Assessment:   1) Mild acute L4 vertebral compression fracture, acute, traumatic  2) L4-5 degenerative spondylolisthesis, grade 1    Plan:  1) Discussed w/ Dr. Diaz Bachelor. CT imaging reviewed. Patient received LSO brace today, to be worn when ambulating and prn for comfort. Follow up in 2-3 weeks at discharge. Discussed with patient and family and all questions addressed.      Theo Portillo

## 2022-03-06 NOTE — PROGRESS NOTES
Department of Orthopedic Surgery  Physician Assistant Progress Note      SUBJECTIVE  Patient is POD #3 from left hip ORIF with Intertan. Also treating L4 compression fracture conservatively with TLSO. Pain moderately controlled. Working with PT and OT. Aquino removed yesterday. Patient states she still hasn't urinated or had a bowel movement. Patient wants to go home upon discharge. Will have family around for assistance. OBJECTIVE    Physical    VITALS:  BP (!) 114/52   Pulse 84   Temp 101 °F (38.3 °C) (Oral)   Resp 18   Ht 5' 2\" (1.575 m)   Wt 130 lb (59 kg)   SpO2 92%   BMI 23.78 kg/m²   CONSTITUTIONAL:  awake, alert, cooperative, no apparent distress, and appears stated age  MUSCULOSKELETAL: On exam of left hip/thigh, bandages are clean and intact. No erythema, edema, or ecchymosis. No pain with small arc ROM of left hip and knee. No bilateral calf tenderness. DF, PF, EHL 5/5 bilaterally. Pedal pulses palpable bilaterally.     Data    CBC:   Lab Results   Component Value Date    WBC 9.5 03/06/2022    RBC 3.44 03/06/2022    HGB 10.2 03/06/2022    HCT 31.8 03/06/2022    MCV 92.4 03/06/2022    MCH 29.7 03/06/2022    MCHC 32.1 03/06/2022    RDW 13.5 08/25/2014     03/06/2022    MPV 9.1 03/06/2022     CMP:    Lab Results   Component Value Date     03/06/2022    K 4.1 03/06/2022    CL 98 03/06/2022    CO2 23 03/06/2022    BUN 10 03/06/2022    CREATININE 0.7 03/06/2022    LABGLOM 82 03/06/2022    GLUCOSE 112 03/06/2022    PROT 7.1 08/04/2018    LABALBU 4.2 08/04/2018    CALCIUM 8.8 03/06/2022    BILITOT 0.2 08/04/2018    ALKPHOS 73 08/04/2018    AST 19 08/04/2018    ALT 18 08/04/2018     Current Inpatient Medications    Current Facility-Administered Medications: calcium carbonate (TUMS) chewable tablet 1,000 mg, 1,000 mg, Oral, TID PRN  0.9 % sodium chloride infusion, , IntraVENous, Continuous  senna (SENOKOT) tablet 17.2 mg, 2 tablet, Oral, BID  docusate sodium (COLACE) capsule 100 mg, 100 mg, Oral, BID  magnesium hydroxide (MILK OF MAGNESIA) 400 MG/5ML suspension 30 mL, 30 mL, Oral, BID PRN  polyethylene glycol (GLYCOLAX) packet 17 g, 17 g, Oral, BID  bisacodyl (DULCOLAX) suppository 10 mg, 10 mg, Rectal, Daily PRN  cyclobenzaprine (FLEXERIL) tablet 10 mg, 10 mg, Oral, TID PRN  HYDROcodone-acetaminophen (NORCO) 5-325 MG per tablet 2 tablet, 2 tablet, Oral, Q4H PRN  sodium chloride flush 0.9 % injection 5-40 mL, 5-40 mL, IntraVENous, 2 times per day  sodium chloride flush 0.9 % injection 5-40 mL, 5-40 mL, IntraVENous, PRN  0.9 % sodium chloride infusion, 25 mL, IntraVENous, PRN  acetaminophen (TYLENOL) tablet 650 mg, 650 mg, Oral, Q6H  diphenhydrAMINE (BENADRYL) tablet 25 mg, 25 mg, Oral, Q6H PRN **OR** diphenhydrAMINE (BENADRYL) injection 25 mg, 25 mg, IntraVENous, Q6H PRN  ondansetron (ZOFRAN-ODT) disintegrating tablet 4 mg, 4 mg, Oral, Q8H PRN **OR** ondansetron (ZOFRAN) injection 4 mg, 4 mg, IntraVENous, Q6H PRN  rivaroxaban (XARELTO) tablet 10 mg, 10 mg, Oral, Daily  HYDROcodone-acetaminophen (NORCO) 5-325 MG per tablet 1 tablet, 1 tablet, Oral, Q4H PRN  morphine (PF) injection 2 mg, 2 mg, IntraVENous, Q2H PRN    ASSESSMENT AND PLAN      S/p left hip ORIF (Intertan)  L4 compression fracture    Continue pain management and PT/OT. Social work consult pending for discharge planning.

## 2022-03-06 NOTE — PLAN OF CARE
Problem: Falls - Risk of:  Goal: Will remain free from falls  Description: Will remain free from falls  Outcome: Ongoing  Note: Patient remained free of falls this shift. Fall precautions in place. Items within reach, call light within reach and side rails up x2. Bed alarm is on. Purposeful rounding in place. Patient verbalizes understanding of using call light to ask for assistance as needed. Goal: Absence of physical injury  Description: Absence of physical injury  Outcome: Ongoing  Note: No injury has occurred this shift . Fall precautions in place and purposeful rounding. Problem: Skin Integrity:  Goal: Will show no infection signs and symptoms  Description: Will show no infection signs and symptoms  Outcome: Ongoing  Note: Pt has 3 left leg surgical incisions. Dressings are dry and intact and not due to be changed today. Fever of 100 this shift. Goal: Absence of new skin breakdown  Description: Absence of new skin breakdown  Outcome: Ongoing  Note: Pt has 3 left leg surgical incisions. Dressings are dry and intact and not due to be changed today. Full skin assessment performed. Pt understands the importance of frequent repositioning in order to prevent any skin breakdown. Assisting patient to turn and reposition as tolerated to prevent skin breakdown. Problem: Pain:  Goal: Pain level will decrease  Description: Pain level will decrease  Outcome: Ongoing  Note: Acute surgical left leg pain noted. Pain Assessment: 0-10  Pain Level: 0  Patient's Stated Pain Goal: No pain   Is pain goal met at this time? Yes   Pt states oral medication helping to achieve pain goal of a 3 on scale. Non-Pharmaceutical Pain Intervention(s): Rest,Repositioned,Cold applied   Goal: Control of acute pain  Description: Control of acute pain  Outcome: Ongoing  Note: Acute surgical left leg pain noted. Pain Assessment: 0-10  Pain Level: 0  Patient's Stated Pain Goal: No pain   Is pain goal met at this time?   Yes   Pt states oral medication helping to achieve pain goal of a 3 on scale. Non-Pharmaceutical Pain Intervention(s): Rest,Repositioned,Cold applied   Goal: Control of chronic pain  Description: Control of chronic pain  Outcome: Ongoing  Note: Patient has chronic back pain at times. Denies at this time. Problem: Activity:  Goal: Ability to ambulate will improve  Description: Ability to ambulate will improve  Outcome: Ongoing  Note: Patient is up with 2 assist with sarasteady. Patient can be weight bearing as tolerated. PT/OT on case. Problem: Nutritional:  Goal: Ability to attain and maintain optimal nutritional status will improve  Description: Ability to attain and maintain optimal nutritional status will improve  Outcome: Ongoing  Note: Patient is regular diet and tolerating without nausea. Poor PO intake noted. Encouraged PO intake. Problem: Physical Regulation:  Goal: Will remain free from infection  Description: Will remain free from infection  Outcome: Ongoing  Note: Left leg incisions noted with small drainage. Febrile at 100. Resolved. Encouraged IS. Problem: Respiratory:  Goal: Ability to maintain adequate ventilation will improve  Description: Ability to maintain adequate ventilation will improve  Outcome: Ongoing  Note: No supplemental oxygen in use. Oxygen above 92% on room air. Denies SOB or chest pain. Will monitor. Problem: Self-Care:  Goal: Ability to meet self-care needs will improve  Description: Ability to meet self-care needs will improve  Outcome: Ongoing  Note: Patient able to verbalize needs. Assisting as they arise. Patient is able to be active in some activities of daily living. Will monitor. Problem: Urinary Elimination:  Goal: Ability to reestablish a normal urinary elimination pattern will improve - after catheter removal  Description: Ability to reestablish a normal urinary elimination pattern will improve  Outcome: Ongoing  Note: Aquino catheter removed.  Patient is due to void. Problem: Cardiovascular  Goal: No DVT, peripheral vascular complications  Outcome: Ongoing  Note: Pt without s/s of DVT. Pt able to take prescribed anticoagulant (xarelto) to help prevent development of DVT. Problem: GI  Goal: No bowel complications  Outcome: Ongoing  Note: Pt with active bowel sounds, not passing flatus, and without nausea. Taking prescribed medications to assist with BM. No BM since surgery. Problem: Discharge Planning:  Goal: Discharged to appropriate level of care  Description: Discharged to appropriate level of care  Outcome: Ongoing  Note: atient is requesting IP rehab or home with [facility]. Patient may require ECF due to poor mobility and note being able to tolerate 3 hours of therapy. Patient is currently a 2 assist with [facility]. PT/OT notes needed.  and  workings towards discharge. Davon Conner CAREPLANCare plan reviewed with patient. Patient  verbalize understanding of the plan of care and contribute to goal setting.

## 2022-03-06 NOTE — PROCEDURES
A Bladder scan was performed at 0701 . The patient's last void was at patient is incontinent . The residual amount was measured to be 15 ML. Report of results was given to [facility].

## 2022-03-06 NOTE — FLOWSHEET NOTE
03/06/22 0721   Treatment Team Notification   Reason for Communication Review case;Evaluate  (no void since echevarria.  bladder scan 15 ml.)   Team Member Name [facility]   Treatment Team Role Consulting Provider   Method of Communication Secure Message   Response Waiting for response   Notification Time [id number]

## 2022-03-06 NOTE — PROGRESS NOTES
Hospitalist Progress Note      Patient:  Pastor Ramos    Unit/Bed:7K-24/024-A  YOB: 1950  MRN: [id number]   Acct: [de-identified]   PCP: Amaya Rincon MD  Date of Admission: 3/3/2022    Assessment/Plan:    1. L femur fx s/p open treatment of a subtrochanteric fracture with a CMN 3/3:  Ortho primary and managing. WBAT. Pain control, pt sites uncontrolled pain with movement and weightbearing. PT/OT. Recommend SNF vs Inpatient Rehab but pt wants to go home. 2. Mild acute compression fx of L4: Ortho primary, treating conservatively with TLSO  3. Oliguria: likely multifactorial with reduced PO intake, dehydration, in addition to Benadryl (anticholingeric effect). Repeat bladder scan s/p IVFs. Stop Benadryl. 4. Constipation: continue with bowel regimen, no abd pain, n/v. Active bowel sounds. 5. Hypotension, intermittent: BP running low normal to hypotensive at times. No home therapies. Likely hypovolemic. IVFs, continue to monitor. 6. Itching: no apparent rash/ skin abnormality. Stop Benadryl. Order topical calamine PRN. 7. Hyponatremia: mild, repeat BMP in am   8. Acute normocytic anemia: postoperative, no gross bleeding. Continue to monitor and transfuse for Hgb < 7 or hemodynamic instability. 9. Postoperative pulmonary insufficiency, resolved: continue IS, stable on RA    Chief Complaint: SOB    Initial H and P:-    Per initial consult: \"The patient is a 66 y.o. female with significant past medical history of osteoarthritis is admitted for left femur fracture POD #1 s/p left intertrochanteric femur fx repair with Intertan. She tripped over her feet at work [employer] and landed on her left hip and lower back.  She states that she was able to walk afterwards. [facility].  She does not remember hitting her head. Surgery was uncomplicated. She developed sudden onset of SOB and bilateral calf pain. No cough.  No other symptoms\"    Subjective (past 24 hours):   3/6: pt doing alright, lying in bed with  at bedside. She reports signficant pain any time she moves or even if her foot is touched. Discussed the probability of pt being able to manage appropriately at home given this as she wants to consider home vs rehab. Educated regarding benefits of continued monitor and therapy especially given her current pain level and mobility concerns. Pt has had reduced urine output in addition to constipation x4-5 days. No abd pain, n/v/d. No fever/chills. No suprapubic pain. No CP/SOB. Past medical history, family history, social history and allergies reviewed again and is unchanged since admission. ROS (All review of systems completed. Pertinent positives noted. Otherwise All other systems reviewed and negative.)     Medications:  Reviewed    Infusion Medications    sodium chloride 100 mL/hr at 03/06/22 0744    sodium chloride       Scheduled Medications    senna  2 tablet Oral BID    docusate sodium  100 mg Oral BID    polyethylene glycol  17 g Oral BID    sodium chloride flush  5-40 mL IntraVENous 2 times per day    acetaminophen  650 mg Oral Q6H    rivaroxaban  10 mg Oral Daily     PRN Meds: calcium carbonate, Calamine, magnesium hydroxide, bisacodyl, cyclobenzaprine, HYDROcodone-acetaminophen, sodium chloride flush, sodium chloride, ondansetron **OR** ondansetron, HYDROcodone 5 mg - acetaminophen, morphine      Intake/Output Summary (Last 24 hours) at 3/6/2022 1039  Last data filed at 3/6/2022 0421  Gross per 24 hour   Intake 555 ml   Output 1800 ml   Net -1245 ml       Diet:  ADULT DIET; Regular    Exam:  BP (!) 100/44   Pulse 84   Temp 99.7 °F (37.6 °C) (Oral)   Resp 18   Ht 5' 2\" (1.575 m)   Wt 130 lb (59 kg)   SpO2 92%   BMI 23.78 kg/m²   General appearance: elderly, frail, no apparent distress, appears stated age and cooperative. HEENT: Pupils equal, round, and reactive to light.  Conjunctivae/corneas NEGATIVE 08/04/2018    SPECGRAV 1.010 09/04/2014    GLUCOSEU NEGATIVE 08/04/2018       Radiology:  VL DUP LOWER EXTREMITY VENOUS BILATERAL   Final Result   No evidence of deep venous thrombosis in the bilateral lower extremities. **This report has been created using voice recognition software. It may contain minor errors which are inherent in voice recognition technology. **      Final report electronically signed by Dr. Floyd Mcclain MD on 3/5/2022 10:44 AM      CTA CHEST [address]   Final Result       1. No evidence of pulmonary embolus. 2. 7 mm nodule at the right major fissure. Consider follow-up CT in 6-12 months per Fleischner criteria. 3. Right hilar lymphadenopathy. **This report has been created using voice recognition software. It may contain minor errors which are inherent in voice recognition technology. **      Final report electronically signed by Dr. Floyd Mcclain MD on 3/5/2022 10:08 AM      CT LUMBAR SPINE WO CONTRAST   Final Result   Mild acute compression deformity of L4 with approximately 15% central height loss. **This report has been created using voice recognition software. It may contain minor errors which are inherent in voice recognition technology. **      Final report electronically signed by Dr. Floyd Mcclain MD on 3/5/2022 10:39 AM      XR LUMBAR SPINE (2-3 VIEWS)   Final Result    Stable degenerative changes of the lumbar spine. **This report has been created using voice recognition software. It may contain minor errors which are inherent in voice recognition technology. **      Final report electronically signed by Dr. Floyd Mcclain MD on 3/4/2022 3:16 PM      FLUORO FOR SURGICAL PROCEDURES   Final Result      XR FEMUR LEFT (MIN 2 VIEWS)   Final Result   FINDINGS/IMPRESSION: Saved fluoroscopic images show intramedullary nail within the femur with cross threaded screws traversing the left femoral neck without evidence of hardware failure. There is anatomic alignment. **This report has been created using voice recognition software. It may contain minor errors which are inherent in voice recognition technology. **      Final report electronically signed by Dr. Megan Clayton MD on 3/5/2022 8:11 AM      CT HEAD WO CONTRAST   Final Result      No mass effect or acute hemorrhage. **This report has been created using voice recognition software. It may contain minor errors which are inherent in voice recognition technology. **      Final report electronically signed by Dr. Laquita Waggoner on 3/3/2022 4:37 PM      CT CERVICAL SPINE WO CONTRAST   Final Result   1. No fracture or spondylolisthesis of the cervical spine. 2. Multilevel degenerative disc disease, neural foraminal narrowing and central canal stenosis as detailed above. Final report electronically signed by Dr. Laquita Waggoner on 3/3/2022 4:41 PM      XR CHEST PORTABLE   Final Result      No acute intrathoracic process. **This report has been created using voice recognition software. It may contain minor errors which are inherent in voice recognition technology. **      Final report electronically signed by Dr. Laquita Waggoner on 3/3/2022 4:14 PM      XR HIP LEFT (2-3 VIEWS)   Final Result      Comminuted fracture of the proximal femur. Final report electronically signed by Dr. Laquita Waggoner on 3/3/2022 4:15 PM        XR LUMBAR SPINE (2-3 VIEWS)    Result Date: 3/4/2022  PROCEDURE: XR LUMBAR SPINE (2-3 VIEWS) CLINICAL INFORMATION: low back pain, fall 3/3, COMPARISON: MRI lumbar spine dated 9/26/2018. TECHNIQUE: AP and lateral views of the lumbar spine. FINDINGS: There is a minimal dextrocurvature of the lumbar spine, stable compared to prior MRI.  There is grade 1 anterolisthesis of L4 relative to L5 on the basis of degenerative change, stable compared to prior exam. There is facet hypertrophy at the lower lumbar  levels, similar to prior MRI. There is diffuse demineralization. There is gaseous distention of large bowel. Stable degenerative changes of the lumbar spine. **This report has been created using voice recognition software. It may contain minor errors which are inherent in voice recognition technology. ** Final report electronically signed by Dr. Crow Soria MD on 3/4/2022 3:16 PM    XR HIP LEFT (2-3 VIEWS)    Result Date: 3/3/2022  PROCEDURE: XR HIP LEFT (2-3 VIEWS) CLINICAL INFORMATION: Fall TECHNIQUE: 2 views of the left hip COMPARISON: None FINDINGS: There is a minimally displaced comminuted intertrochanteric fracture of the proximal femur. No dislocation is identified. There is soft tissue swelling adjacent to the fracture site. Comminuted fracture of the proximal femur. Final report electronically signed by Dr. Dean Rebolledo on 3/3/2022 4:15 PM    XR FEMUR LEFT (MIN 2 VIEWS)    Result Date: 3/5/2022  PROCEDURE: XR FEMUR LEFT (MIN 2 VIEWS) CLINICAL INFORMATION: left femur orif intertan . COMPARISON: Left hip series dated 3/3/2022. TECHNIQUE: Fluoroscopic support was provided for ORIF of left femur fracture. There are 3 saved fluoroscopic images. Fluoroscopy time = 62.7 seconds. FINDINGS/IMPRESSION: Saved fluoroscopic images show intramedullary nail within the femur with cross threaded screws traversing the left femoral neck without evidence of hardware failure. There is anatomic alignment. **This report has been created using voice recognition software. It may contain minor errors which are inherent in voice recognition technology. ** Final report electronically signed by Dr. Crow Soria MD on 3/5/2022 8:11 AM    CT HEAD WO CONTRAST    Result Date: 3/3/2022  PROCEDURE: CT HEAD WO CONTRAST CLINICAL INFORMATION: Fall TECHNIQUE: CT scan of the head was performed from the vertex to the skull base without use of intravenous contrast. Axial images as well as coronal and sagittal reconstructions were obtained. All CT scans at this facility use dose modulation, iterative reconstruction, and/or weight-based dosing when appropriate to reduce radiation dose to as low as reasonably achievable. COMPARISON: CT head 8/4/2018 FINDINGS: There is no mass effect, midline shift or acute hemorrhage. Ventricles and CSF spaces are within normal limits. Gray-white matter differentiation is preserved. Visualized orbits, paranasal sinuses and mastoid air cells are unremarkable. No mass effect or acute hemorrhage. **This report has been created using voice recognition software. It may contain minor errors which are inherent in voice recognition technology. ** Final report electronically signed by Dr. Mal Roe on 3/3/2022 4:37 PM    CT CERVICAL SPINE WO CONTRAST    Result Date: 3/3/2022  PROCEDURE: CT CERVICAL SPINE WO CONTRAST CLINICAL INFORMATION: Fall TECHNIQUE: CT of the cervical spine was performed without use of intravenous contrast. Axial images as well as coronal and sagittal reconstructions were obtained. All CT scans at this facility use dose modulation, iterative reconstruction, and/or weight-based dosing when appropriate to reduce radiation dose to as low as reasonably achievable. COMPARISON: None FINDINGS: There is straightening of normal cervical lordosis. Disc space narrowing and osteophyte formation are present at the C4-C5, C5-C6 and C6-C7 levels. Cervical vertebral body heights are preserved. There is no fracture or spondylolisthesis. The atlantodental interval is normal. Neural foraminal narrowing is present in the bilateral C3-C4, C4-C5, C5-C6 and C6-C7 levels. There is mild central canal stenosis at these levels. Atherosclerotic calcifications are present in the bilateral extracranial carotid arteries. There is minimal scarring at the bilateral lung apices. 1. No fracture or spondylolisthesis of the cervical spine.  2. Multilevel degenerative disc disease, neural foraminal narrowing and central canal stenosis as detailed above. Final report electronically signed by Dr. Arturo Richard on 3/3/2022 4:41 PM    XR CHEST PORTABLE    Result Date: 3/3/2022  PROCEDURE: XR CHEST PORTABLE CLINICAL INFORMATION: Fall TECHNIQUE: Mobile AP chest radiograph. COMPARISON: PA and lateral chest radiographs 4/18/2019 FINDINGS: The costophrenic angles and the upper abdomen are excluded from the submitted image. Cardiac silhouette is within normal limits. Atherosclerotic allegations are present in the thoracic aorta. There are no lung consolidations. Degenerative changes in the thoracic spine are poorly visualized. Soft tissues are unremarkable. No acute intrathoracic process. **This report has been created using voice recognition software. It may contain minor errors which are inherent in voice recognition technology. ** Final report electronically signed by Dr. Arturo Richard on 3/3/2022 4:14 PM    FLUORO FOR SURGICAL PROCEDURES    Result Date: 3/3/2022  Radiology exam is complete. No Radiologist dictation. Please follow up with ordering provider.        Electronically signed by Jeremy Solorzano PA-C on 3/6/2022 at 10:39 AM

## 2022-03-06 NOTE — PROGRESS NOTES
Hospitalist aware this am that pt has not voided since echevarria was removed. IV fluids started. Pt denies any abd discomfort. Encouraged po fluids. Pt able to void approx 50ml this am at [facility], okay to bladder scan around 1pm per [facility]. Pt voided 75ml then bladder scan done at approx 1450 and pt bladder scan [id number]. New order rcvd to st cath pt x 1. St cath pt and obtained 300ml clear yellow urine upon return, pt tolerated well. Pt able to void 40 ml at 1830 and urine specimen was sent to the lab.

## 2022-03-06 NOTE — PLAN OF CARE
Problem: Falls - Risk of:  Goal: Will remain free from falls  Description: Will remain free from falls  Outcome: Ongoing  Pt using call light appropriately to call for assistance with ambulation to the bathroom and to chair. Pt is also compliant with use of non-skid slippers. Pt reports understanding of fall prevention when discussed. Problem: Skin Integrity:  Goal: Will show no infection signs and symptoms  Description: Will show no infection signs and symptoms  Outcome: Ongoing  Pt's surgical incision healing. Dressing is dry and intact and not due to be changed today. No other skin impairments noted. Pt understands the importance of frequent repositioning in order to prevent any skin breakdown. Problem: Pain:  Description: Pain management should include both nonpharmacologic and pharmacologic interventions. Goal: Pain level will decrease  Description: Pain level will decrease  Outcome: Ongoing  Pt states oral  medication helping to achieve effective pain relief. Problem: Activity:  Goal: Ability to ambulate will improve  Description: Ability to ambulate will improve  Outcome: Ongoing  Pt up with assistance x 2 and at times uses robinson kent. Problem: Urinary Elimination:  Goal: Ability to reestablish a normal urinary elimination pattern will improve - after catheter removal  Description: Ability to reestablish a normal urinary elimination pattern will improve  Outcome: Ongoing  Pt voiding inadequate amounts without difficulty. IV fluids started this morning on pt. Pt was st cath x 1 today and pt voiding small amounts. Urine specimen sent. Problem: GI  Goal: No bowel complications  Outcome: Ongoing  Pt with active bowel sounds, passing flatus, and without nausea.  Taking prescribed medications to assist with BM      Problem: Discharge Planning:  Goal: Discharged to appropriate level of care  Description: Discharged to appropriate level of care  Outcome: Ongoing   Care manager and social working helping with discharge needs. Care plan reviewed with patient . Patient verbalizes understanding of the plan of care and contribute to goal setting.

## 2022-03-07 LAB
ANION GAP SERPL CALCULATED.3IONS-SCNC: 8 MEQ/L (ref 8–16)
BUN BLDV-MCNC: 10 MG/DL (ref 7–22)
CALCIUM SERPL-MCNC: 8.1 MG/DL (ref 8.5–10.5)
CHLORIDE BLD-SCNC: 105 MEQ/L (ref 98–111)
CO2: 25 MEQ/L (ref 23–33)
CREAT SERPL-MCNC: 0.5 MG/DL (ref 0.4–1.2)
ERYTHROCYTE [DISTWIDTH] IN BLOOD BY AUTOMATED COUNT: 13.2 % (ref 11.5–14.5)
ERYTHROCYTE [DISTWIDTH] IN BLOOD BY AUTOMATED COUNT: 44.9 FL (ref 35–45)
GFR SERPL CREATININE-BSD FRML MDRD: > 90 ML/MIN/1.73M2
GLUCOSE BLD-MCNC: 97 MG/DL (ref 70–108)
HCT VFR BLD CALC: 26.5 % (ref 37–47)
HCT VFR BLD CALC: 27.6 % (ref 37–47)
HEMOGLOBIN: 8.5 GM/DL (ref 12–16)
HEMOGLOBIN: 8.9 GM/DL (ref 12–16)
MCH RBC QN AUTO: 29.4 PG (ref 26–33)
MCHC RBC AUTO-ENTMCNC: 32.1 GM/DL (ref 32.2–35.5)
MCV RBC AUTO: 91.7 FL (ref 81–99)
PLATELET # BLD: 147 THOU/MM3 (ref 130–400)
PMV BLD AUTO: 9.7 FL (ref 9.4–12.4)
POTASSIUM REFLEX MAGNESIUM: 4.4 MEQ/L (ref 3.5–5.2)
RBC # BLD: 2.89 MILL/MM3 (ref 4.2–5.4)
SODIUM BLD-SCNC: 138 MEQ/L (ref 135–145)
WBC # BLD: 6 THOU/MM3 (ref 4.8–10.8)

## 2022-03-07 PROCEDURE — 51798 US URINE CAPACITY MEASURE: CPT

## 2022-03-07 PROCEDURE — 99222 1ST HOSP IP/OBS MODERATE 55: CPT | Performed by: PHYSICAL MEDICINE & REHABILITATION

## 2022-03-07 PROCEDURE — 85018 HEMOGLOBIN: CPT

## 2022-03-07 PROCEDURE — 6360000002 HC RX W HCPCS: Performed by: HOSPITALIST

## 2022-03-07 PROCEDURE — 97116 GAIT TRAINING THERAPY: CPT

## 2022-03-07 PROCEDURE — 97530 THERAPEUTIC ACTIVITIES: CPT

## 2022-03-07 PROCEDURE — 97110 THERAPEUTIC EXERCISES: CPT

## 2022-03-07 PROCEDURE — 85014 HEMATOCRIT: CPT

## 2022-03-07 PROCEDURE — 6370000000 HC RX 637 (ALT 250 FOR IP): Performed by: PHYSICIAN ASSISTANT

## 2022-03-07 PROCEDURE — 2580000003 HC RX 258: Performed by: PHYSICIAN ASSISTANT

## 2022-03-07 PROCEDURE — 99233 SBSQ HOSP IP/OBS HIGH 50: CPT | Performed by: PHYSICIAN ASSISTANT

## 2022-03-07 PROCEDURE — 1200000000 HC SEMI PRIVATE

## 2022-03-07 PROCEDURE — 85027 COMPLETE CBC AUTOMATED: CPT

## 2022-03-07 PROCEDURE — 80048 BASIC METABOLIC PNL TOTAL CA: CPT

## 2022-03-07 PROCEDURE — 36415 COLL VENOUS BLD VENIPUNCTURE: CPT

## 2022-03-07 PROCEDURE — 2580000003 HC RX 258: Performed by: ORTHOPAEDIC SURGERY

## 2022-03-07 PROCEDURE — 6360000002 HC RX W HCPCS: Performed by: PHYSICIAN ASSISTANT

## 2022-03-07 PROCEDURE — 6370000000 HC RX 637 (ALT 250 FOR IP): Performed by: ORTHOPAEDIC SURGERY

## 2022-03-07 RX ORDER — FLUCONAZOLE 200 MG/1
200 TABLET ORAL DAILY
Status: DISCONTINUED | OUTPATIENT
Start: 2022-03-07 | End: 2022-03-10

## 2022-03-07 RX ORDER — LEVOFLOXACIN 5 MG/ML
500 INJECTION, SOLUTION INTRAVENOUS EVERY 24 HOURS
Status: DISCONTINUED | OUTPATIENT
Start: 2022-03-07 | End: 2022-03-08

## 2022-03-07 RX ORDER — VANCOMYCIN HYDROCHLORIDE 125 MG/1
125 CAPSULE ORAL 4 TIMES DAILY
Status: DISCONTINUED | OUTPATIENT
Start: 2022-03-07 | End: 2022-03-07

## 2022-03-07 RX ADMIN — FLUCONAZOLE 200 MG: 200 TABLET ORAL at 16:27

## 2022-03-07 RX ADMIN — HYDROCODONE BITARTRATE AND ACETAMINOPHEN 1 TABLET: 5; 325 TABLET ORAL at 23:14

## 2022-03-07 RX ADMIN — SENNOSIDES 17.2 MG: 8.6 TABLET ORAL at 07:54

## 2022-03-07 RX ADMIN — CEFTRIAXONE SODIUM 1000 MG: 1 INJECTION, POWDER, FOR SOLUTION INTRAMUSCULAR; INTRAVENOUS at 14:21

## 2022-03-07 RX ADMIN — BISACODYL 10 MG: 10 SUPPOSITORY RECTAL at 07:54

## 2022-03-07 RX ADMIN — LEVOFLOXACIN 500 MG: 5 INJECTION, SOLUTION INTRAVENOUS at 02:34

## 2022-03-07 RX ADMIN — DOCUSATE SODIUM 100 MG: 100 CAPSULE, LIQUID FILLED ORAL at 07:54

## 2022-03-07 RX ADMIN — SODIUM CHLORIDE, PRESERVATIVE FREE 10 ML: 5 INJECTION INTRAVENOUS at 21:17

## 2022-03-07 RX ADMIN — HYDROCODONE BITARTRATE AND ACETAMINOPHEN 2 TABLET: 5; 325 TABLET ORAL at 09:24

## 2022-03-07 RX ADMIN — ACETAMINOPHEN 650 MG: 325 TABLET ORAL at 12:35

## 2022-03-07 RX ADMIN — HYDROCODONE BITARTRATE AND ACETAMINOPHEN 2 TABLET: 5; 325 TABLET ORAL at 16:27

## 2022-03-07 RX ADMIN — CYCLOBENZAPRINE 10 MG: 10 TABLET, FILM COATED ORAL at 12:35

## 2022-03-07 RX ADMIN — HYDROCODONE BITARTRATE AND ACETAMINOPHEN 2 TABLET: 5; 325 TABLET ORAL at 04:26

## 2022-03-07 RX ADMIN — MAGNESIUM HYDROXIDE 30 ML: 2400 SUSPENSION ORAL at 04:26

## 2022-03-07 RX ADMIN — RIVAROXABAN 10 MG: 10 TABLET, FILM COATED ORAL at 19:57

## 2022-03-07 RX ADMIN — POLYETHYLENE GLYCOL 3350 17 G: 17 POWDER, FOR SOLUTION ORAL at 07:54

## 2022-03-07 ASSESSMENT — PAIN SCALES - GENERAL
PAINLEVEL_OUTOF10: 7
PAINLEVEL_OUTOF10: 6
PAINLEVEL_OUTOF10: 8
PAINLEVEL_OUTOF10: 5
PAINLEVEL_OUTOF10: 7
PAINLEVEL_OUTOF10: 10
PAINLEVEL_OUTOF10: 10
PAINLEVEL_OUTOF10: 7
PAINLEVEL_OUTOF10: 7

## 2022-03-07 ASSESSMENT — PAIN DESCRIPTION - DESCRIPTORS
DESCRIPTORS: ACHING
DESCRIPTORS: SORE

## 2022-03-07 ASSESSMENT — ENCOUNTER SYMPTOMS
DIARRHEA: 0
COUGH: 0
SHORTNESS OF BREATH: 0
RHINORRHEA: 0
NAUSEA: 0
CONSTIPATION: 0
SORE THROAT: 0
EYE PAIN: 0
BACK PAIN: 1
TROUBLE SWALLOWING: 0
ABDOMINAL PAIN: 1
EYE DISCHARGE: 0
VOMITING: 0
WHEEZING: 0

## 2022-03-07 ASSESSMENT — PAIN DESCRIPTION - LOCATION
LOCATION: LEG
LOCATION: LEG

## 2022-03-07 ASSESSMENT — PAIN DESCRIPTION - PROGRESSION
CLINICAL_PROGRESSION: NOT CHANGED
CLINICAL_PROGRESSION: NOT CHANGED

## 2022-03-07 ASSESSMENT — PAIN DESCRIPTION - ONSET
ONSET: ON-GOING
ONSET: ON-GOING

## 2022-03-07 ASSESSMENT — PAIN DESCRIPTION - FREQUENCY
FREQUENCY: CONTINUOUS
FREQUENCY: CONTINUOUS

## 2022-03-07 ASSESSMENT — PAIN DESCRIPTION - PAIN TYPE
TYPE: SURGICAL PAIN
TYPE: ACUTE PAIN;SURGICAL PAIN

## 2022-03-07 ASSESSMENT — PAIN - FUNCTIONAL ASSESSMENT
PAIN_FUNCTIONAL_ASSESSMENT: PREVENTS OR INTERFERES SOME ACTIVE ACTIVITIES AND ADLS
PAIN_FUNCTIONAL_ASSESSMENT: PREVENTS OR INTERFERES SOME ACTIVE ACTIVITIES AND ADLS

## 2022-03-07 ASSESSMENT — PAIN DESCRIPTION - ORIENTATION
ORIENTATION: UPPER;LEFT
ORIENTATION: LEFT

## 2022-03-07 NOTE — CARE COORDINATION
3/7/22, 7:30 AM EST    DISCHARGE ON GOING EVALUATION    Paul Aguirre day: 4  [id number]  Procedure: 3/3 Open treatment of a subtrochanteric fracture with a cephamedullary nail  by Dr Hoa Gallo  Barriers to Discharge: POD # 4 -  Aquino removed, pt had not voided so bladder scan >350 mL, straight cathed for 300 mL, pain control continues. Walked 3 feet with PT. Added SLP cog eval.  Incision care, pain control. Patient Goals/Plan/Treatment Preferences: from home with ; need therapy recommendations. IP rehab consulted as requested by pt and daughter Char Simon;   Will be a precert for IP rehab.

## 2022-03-07 NOTE — CONSULTS
EOB  Standing Balance: Contact Guard Assistance, X 2. pt tolerated standing for approx 10 minutes due to increased time to complete transfer     BED MOBILITY:  Supine to Sit: Moderate Assistance, X 2, with head of bed raised, with verbal cues , with increased time for completion with assist for support of LLE and at shoulders     TRANSFERS:  Sit to Stand: Moderate Assistance, X 2, with increased time for completion, cues for hand placement. from EOB  Stand to Sit: Minimal Assistance, X 2, with increased time for completion, cues for hand placement, to/from chair with arms. with assist for controlled descent  To/From Bed and Chair: Contact Guard Assistance, X 2, with increased time for completion, with assist increasing to Mod x 2. Pt unable to  feet to take steps, but does scoot BLEs. Patient required max verbal cues for WBing through BUEs into RW.       FUNCTIONAL MOBILITY:  Unable to demonstrate with difficulty weight bearing into LLE due to pain. Does demonstrate ability to  LLE, however unable to complete with RLE. ST:  Not requested      Past Medical History:        Diagnosis Date    Closed fracture of right shoulder 2020       Past Surgical History:        Procedure Laterality Date    HIP FRACTURE SURGERY Left 03/03/2022    LEFT FEMUR ORIF INTERTAN performed by Darwin Dior MD at [address] [facility]  2019       Allergies:     Allergies   Allergen Reactions    Pcn [Penicillins] Shortness Of Breath        Current Medications:   Current Facility-Administered Medications   Medication Dose Route Frequency Provider Last Rate Last Admin    levoFLOXacin (LEVAQUIN) 500 MG/100ML infusion 500 mg  500 mg IntraVENous Q24H Chaz Sanderson MD   Stopped at 03/07/22 0310    cefTRIAXone (ROCEPHIN) 1000 mg IVPB in 50 mL D5W minibag  1,000 mg IntraVENous Q24H Breanna Villarreal PA-C 100 mL/hr at 03/07/22 1421 1,000 mg at 03/07/22 1421    fluconazole (DIFLUCAN) tablet 200 mg  200 mg Oral Daily Breanna Villrareal PA-C        calcium carbonate (TUMS) chewable tablet 1,000 mg  1,000 mg Oral TID PRN Chaz Sanderson MD   1,000 mg at 03/06/22 1212    Calamine 8-8 % lotion   Topical 4x Daily PRN Carter Swenson PA-C        diphenhydrAMINE-zinc acetate cream   Topical TID PRN Annetta Villarreal PA-C        senna (SENOKOT) tablet 17.2 mg  2 tablet Oral BID Molly Bauer MD   17.2 mg at 03/07/22 0754    docusate sodium (COLACE) capsule 100 mg  100 mg Oral BID Molly Bauer MD   100 mg at 03/07/22 0754    magnesium hydroxide (MILK OF MAGNESIA) 400 MG/5ML suspension 30 mL  30 mL Oral BID PRN Molly Bauer MD   30 mL at 03/07/22 0426    polyethylene glycol (GLYCOLAX) packet 17 g  17 g Oral BID Molly Bauer MD   17 g at 03/07/22 0754    bisacodyl (DULCOLAX) suppository 10 mg  10 mg Rectal Daily PRN Molly Bauer MD   10 mg at 03/07/22 0754    cyclobenzaprine (FLEXERIL) tablet 10 mg  10 mg Oral TID PRN Molly Bauer MD   10 mg at 03/07/22 1235    HYDROcodone-acetaminophen (NORCO) 5-325 MG per tablet 2 tablet  2 tablet Oral Q4H PRN Eh Serrano PA-C   2 tablet at 03/07/22 0924    sodium chloride flush 0.9 % injection 5-40 mL  5-40 mL IntraVENous 2 times per day Molly Bauer MD   10 mL at 03/05/22 2048    sodium chloride flush 0.9 % injection 5-40 mL  5-40 mL IntraVENous PRN Molly Bauer MD        0.9 % sodium chloride infusion  25 mL IntraVENous PRN Molly Bauer MD        acetaminophen (TYLENOL) tablet 650 mg  650 mg Oral Q6H Molly Bauer MD   650 mg at 03/07/22 1235    ondansetron (ZOFRAN-ODT) disintegrating tablet 4 mg  4 mg Oral Q8H PRN Molly Bauer MD        Or    ondansetron [facility]) injection 4 mg  4 mg IntraVENous Q6H PRN Molly Bauer MD        rivaroxaban Aliene Angelucci) tablet 10 mg  10 mg Oral Daily Molly Bauer MD   10 mg at 03/06/22 1820    HYDROcodone-acetaminophen (NORCO) 5-325 MG per tablet 1 tablet  1 tablet Oral Q4H PRN Molly Bauer MD   1 tablet at 03/05/22 1612    morphine (PF) injection 2 mg  2 mg IntraVENous Q2H PRN Kaci Mohr MD   2 mg at 03/04/22 0303       Social History:  Social History     Socioeconomic History    Marital status:      Spouse name: Not on file    Number of children: Not on file    Years of education: Not on file    Highest education level: Not on file   Occupational History    Not on file   Tobacco Use    Smoking status: Current Every Day Smoker     Packs/day: 1.00     Types: Cigarettes     Start date: 1963    Smokeless tobacco: Never Used   Substance and Sexual Activity    Alcohol use: Not Currently     Comment: Drinking 1 wine cooler about every 2 to 3 months    Drug use: No    Sexual activity: Not on file   Other Topics Concern    Not on file   Social History Narrative    Not on file     Social Determinants of Health     Financial Resource Strain:     Difficulty of Paying Living Expenses: Not on file   Food Insecurity:     Worried About Running Out of Food in the Last Year: Not on file    Dante of Food in the Last Year: Not on file   Transportation Needs:     Lack of Transportation (Medical): Not on file    Lack of Transportation (Non-Medical):  Not on file   Physical Activity:     Days of Exercise per Week: Not on file    Minutes of Exercise per Session: Not on file   Stress:     Feeling of Stress : Not on file   Social Connections:     Frequency of Communication with Friends and Family: Not on file    Frequency of Social Gatherings with Friends and Family: Not on file    Attends Orthodox Services: Not on file    Active Member of Clubs or Organizations: Not on file    Attends Club or Organization Meetings: Not on file    Marital Status: Not on file   Intimate Partner Violence:     Fear of Current or Ex-Partner: Not on file    Emotionally Abused: Not on file    Physically Abused: Not on file    Sexually Abused: Not on file   Housing Stability:     Unable to Pay for Housing in the Last Year: Not on file    Number of Places Lived in the Last Year: Not on file    Unstable Housing in the Last Year: Not on file     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  Lives with: Her ; her daughter says other family members are also readily available to provide assistance  Home setup: 1 level [address] with 1 step outside front door and 1 step outside garage door without handrail  Previous level of independence: Independent in all ADLs, community ambulation without using any assistive walking device, and driving      Family History:       Problem Relation Age of Onset    Cancer Mother         Bladder cancer    Breast Cancer Mother     Cancer Sister         Small bladder cancer    Coronary Art Dis Sister         s/p stent placement    Heart Attack Maternal Grandmother        Review of Systems:  Review of Systems   Constitutional: Negative for chills, diaphoresis and fatigue. HENT: Negative for ear discharge, ear pain, hearing loss, rhinorrhea, sneezing, sore throat, tinnitus and trouble swallowing. Eyes: Negative for pain and discharge. Respiratory: Negative for cough, shortness of breath and wheezing. Cardiovascular: Negative for chest pain, palpitations and leg swelling. Gastrointestinal: Positive for abdominal pain (Indigestion). Negative for constipation, diarrhea, nausea and vomiting. Endocrine: Negative for cold intolerance and heat intolerance. Genitourinary: Positive for difficulty urinating. Negative for dysuria. Musculoskeletal: Positive for arthralgias (left hip), back pain (lower back), gait problem and myalgias (left thigh). Negative for joint swelling and neck pain. Skin: Negative for rash. Allergic/Immunologic: Negative for food allergies. Neurological: Positive for weakness (Right lower extremity). Negative for dizziness, tremors, speech difficulty, light-headedness, numbness and headaches. Hematological: Does not bruise/bleed easily. Psychiatric/Behavioral: Negative for dysphoric mood, hallucinations and sleep disturbance. The patient is not nervous/anxious.         Physical Exam:  BP (!) 100/54   Pulse 77   Temp 98.7 °F (37.1 °C) (Oral)   Resp 16   Ht 5' 2\" (1.575 m)   Wt 128 lb 11.2 oz (58.4 kg)   SpO2 94%   BMI 23.54 kg/m²   Physical Exam  General:  well-developed, well nourished  female; in no acute distress ; appropriate affect & mood; sitting on reclining chair comfortably; wearing lumbosacral orthosis  Eyes: pupil equally round ; extra-ocular motion intact bilaterally  Head, Ear, Nose, Mouth & Throat : normocephalic ; no tenderness at the face or head scalp ; no discharge from ears or nose ; no deformity ; no facial swelling ; oral mucosa pink   Neck :  supple ; no tenderness ; no muscle spasm  Cardiovascular : regular rate & rhythm ; normal S1 & S2 heart sound ; no murmur ; normal peripheral pulse at the bilateral upper and lower extremities  Pulmonary : Breath sounds present at the bilateral lung fields; no wheezing ; no rale; no crackle  Gastrointestinal : soft, slightly protruded abdomen without tenderness ; normal bowel sound present   Back : no tenderness; no muscle spasm  Skin: no skin lesion or rash ; no pitting edema at all 4 extremities; presence of dressing at left lateral hip and left distal lateral thigh near the knee; mild nonpitting swelling at left foot  Musculoskeletal : no limb asymmetry; no obvious limb deformity; tenderness at the left lateral hip and lateral thigh; no tenderness at bilateral upper extremities & the rest of bilateral lower extremities; no palpable mass at limbs ; left hip and left knee joint laxity not assessed; no joints laxity or crepitation at bilateral upper and right lower extremities; shoulder flexion and abduction passive ROM reaching 170 degrees; right hip flexion passive ROM reaching 110 degrees; left hip flexion passive ROM reaching 90 degrees limited by pain; left knee flexion passive ROM reaching 100 degrees limited by pain; otherwise normal functional joints ROM at the rest of bilateral upper & lower extremities  Cerebral :  alert ; awake ; oriented to place, person and time; follow 1-2 step verbal command; able to recall 2/3 items given immediately and 3/3 items about 3 minutes later; able to repeat series of 5 single digit numbers in correct order forward but not backward; abstract thinking intact; perform serial 7 subtraction test for 6 steps and making 3 mistakes ([id number]  Cerebellum : no dysmetria with bilateral finger-to-nose test ; heel-to-shin test not assessed  Cranial Nerves :  grossly intact CN II to XII function  Sensory : intact light touch and pin prick sensation at bilateral upper & lower extremities  Motor : normal tone at bilateral upper & lower extremities ; 4+/5 to 5/5 muscle strength at bilateral finger abduction; 4+/5 muscle strength at the right hip flexion; 2-/5 muscle strength at left hip abduction and adduction; 2-/5 to 2+/5 muscle strength at left hip flexion; 3-/5 muscle strength at left knee extension; 4-/5 muscle strength at left knee flexion; 4+/5 muscle strength at the left ankle dorsiflexion and plantarflexion; normal 5/5 muscle strength at the rest of bilateral upper & lower extremities  Reflex : 1+ bilateral biceps reflexes; 0 bilateral triceps, bilateral brachioradialis, bilateral knees and bilateral ankles reflexes   Pathological Reflex :  No Froilan's sign ; no Babinski sign ; no ankle clonus  Gait : Not assessed      Diagnostics:  Recent Results (from the past 24 hour(s))   Urinalysis with Microscopic    Collection Time: 03/06/22  6:45 PM   Result Value Ref Range    Glucose, Urine NEGATIVE NEGATIVE mg/dl    Bilirubin Urine NEGATIVE NEGATIVE    Ketones, Urine NEGATIVE NEGATIVE    Specific Gravity, UA 1.012 1.002 - 1.030    Blood, Urine LARGE (A) NEGATIVE    pH, UA 5.5 5.0 - 9.0    Protein, UA TRACE (A) NEGATIVE mg/dl Urobilinogen, Urine 0.2 0.0 - 1.0 eu/dl    Nitrite, Urine NEGATIVE NEGATIVE    Leukocyte Esterase, Urine SMALL (A) NEGATIVE    Color, UA YELLOW YELLOW-STRAW    Character, Urine CLOUDY (A) [id number]    RBC, UA 15-20 0-2/hpf /hpf    WBC, UA  0-4/hpf /hpf    Epithelial Cells, UA 15-25 3-5/hpf /hpf    Bacteria, UA MANY FEW/NONE SEEN    Casts NONE SEEN NONE SEEN /lpf    Crystals NONE SEEN NONE SEEN    Yeast, UA MOD BUDDING NONE SEEN   Culture, Urine    Collection Time: 03/06/22  6:45 PM    Specimen: Urine, clean catch   Result Value Ref Range    Organism gram positive cocci (A)     Urine Culture, Routine [place] count: >100,000 CFU/mL     CBC    Collection Time: 03/07/22  6:13 AM   Result Value Ref Range    WBC 6.0 4.8 - 10.8 thou/mm3    RBC 2.89 (L) 4.20 - 5.40 mill/mm3    Hemoglobin 8.5 (L) 12.0 - 16.0 gm/dl    Hematocrit 26.5 (L) 37.0 - 47.0 %    MCV 91.7 81.0 - 99.0 fL    MCH 29.4 26.0 - 33.0 pg    MCHC 32.1 (L) 32.2 - 35.5 gm/dl    RDW-CV 13.2 11.5 - 14.5 %    RDW-SD 44.9 35.0 - 45.0 fL    Platelets 276 836 - 376 thou/mm3    MPV 9.7 9.4 - 12.4 fL   Basic Metabolic Panel w/ Reflex to MG    Collection Time: 03/07/22  6:13 AM   Result Value Ref Range    Sodium 138 135 - 145 meq/L    Potassium reflex Magnesium 4.4 3.5 - 5.2 meq/L    Chloride 105 98 - 111 meq/L    CO2 25 23 - 33 meq/L    Glucose 97 70 - 108 mg/dL    BUN 10 7 - 22 mg/dL    CREATININE 0.5 0.4 - 1.2 mg/dL    Calcium 8.1 (L) 8.5 - 10.5 mg/dL   Anion Gap    Collection Time: 03/07/22  6:13 AM   Result Value Ref Range    Anion Gap 8.0 8.0 - 16.0 meq/L   Glomerular Filtration Rate, Estimated    Collection Time: 03/07/22  6:13 AM   Result Value Ref Range    Est, Glom Filt Rate >90 ml/min/1.73m2   Hemoglobin and Hematocrit    Collection Time: 03/07/22 12:18 PM   Result Value Ref Range    Hemoglobin 8.9 (L) 12.0 - 16.0 gm/dl    Hematocrit 27.6 (L) 37.0 - 47.0 %       X-ray of left hip (3/3/2022) : Impression   Comminuted fracture of the proximal femur. Portable chest x-ray (3/3/2022) : Impression   No acute intrathoracic process. CT of head without contrast (3/3/2022) : Impression   No mass effect or acute hemorrhage. CT of cervical spine without contrast (3/3/2022) : Impression   1. No fracture or spondylolisthesis of the cervical spine. 2. Multilevel degenerative disc disease, neural foraminal narrowing and central canal stenosis as detailed above. X-ray of lumbar spine (3/4/2022) : Impression    Stable degenerative changes of the lumbar spine. CTA of chest with & without contrast (3/5/2022) : Impression   1. No evidence of pulmonary embolus. 2. 7 mm nodule at the right major fissure. Consider follow-up CT in 6-12 months per Fleischner criteria. 3. Right hilar lymphadenopathy. CT of lumbar spine without contrast (3/5/2022) : Impression   Mild acute compression deformity of L4 with approximately 15% central height loss. Bilateral lower extremities venous duplex doppler study (3/5/2022) : Impression   No evidence of deep venous thrombosis in the bilateral lower extremities. Impression:  · Status post fall at [facility] [employer] resulting  · Left proximal femur minimally displaced commuted intertrochanteric fracture requiring open reduction and internal fixation with cephalomedullary nail  · Acute L4 compression deformity / fracture with 15% central height loss  · Mild cerebral concussion without loss of consciousness  · Possible cognitive impairment   · history of lumbar spine surgery  · History of right shoulder fracture    The patient will benefit from a course of intensive inpatient rehabilitation treatment to improve her function. The patient should be able to tolerate 3 hours/day rehab intervention. We will initiate insurance pre-cert process for inpatient rehab admission.       Recommendations:  · Continue ongoing PT and OT treatment while the patient remains in acute hospital  · Speech therapy evaluation for possible cognitive impairment  · Continue observing weightbearing as tolerated status on left lower extremity  · Continue lumbosacral orthosis application for L4 compression fracture as per ortho spine surgery service  · The patient will benefit from a course of intensive inpatient rehabilitation treatment to improve her function. We will initiate insurance pre-cert process for inpatient rehab admission. · Plan to admit patient to inpatient rehab service after the insurance approval is obtained, all medically necessary diagnostic tests and treatment are completed, and the patient is medically stable and cleared to be discharged from acute hospital.    It was my pleasure to evaluate Gracia Nugent today. Please call with questions.     Saintclair Lev, MD

## 2022-03-07 NOTE — FLOWSHEET NOTE
3/6/22 9:44 PM   UA ordered by CHAYO Gomez resulted     3/6/2022 18:45 Color, UA: YELLOW Bilirubin, Urine: NEGATIVE Ketones, Urine: NEGATIVE Specific Gravity, UA: 1.012 Blood, Urine: LARGE (A) pH, UA: 5.5 Protein, UA: TRACE (A) Urobilinogen, Urine: 0.2 Nitrite, Urine: NEGATIVE Leukocyte Esterase, Urine: SMALL (A) Glucose, Urine: NEGATIVE Casts: NONE SEEN WBC, UA:  RBC, UA: 15-20 Epithelial Cells, UA: 15-25 Bacteria, UA: MANY Yeast, Urine: MOD BUDDING CRYSTALS,XTAL: NONE SEEN Character, Urine: CLOUDY (A)    Patient voided 50 ml's @ 1400. PVR bladder scan showed 350 ml's and pt was strait cathed for 300 ml's. She voided 50 @ 1841, and another 50 @ 2000. Aquino catheter was removed 3-5-22 @ 2242. Read 9:45 PM     3/6/22 9:46 PM   Tnx for the update.

## 2022-03-07 NOTE — PROCEDURES
A Bladder scan was performed at 2352 . The patient's last void was at 2350 . The residual amount was measured to be 447 ML. Report of results was given to [facility].

## 2022-03-07 NOTE — PLAN OF CARE
Problem: Falls - Risk of:  Goal: Will remain free from falls  Description: Will remain free from falls  3/7/2022 0020 by Lisa Joseph RN  Outcome: Ongoing  Note: Patient remained free of falls this shift. Fall precautions in place. Items within reach, call light within reach and side rails up x2. Bed alarm is on. Purposeful rounding in place. Patient verbalizes understanding of using call light to ask for assistance as needed. Problem: Falls - Risk of:  Goal: Absence of physical injury  Description: Absence of physical injury  Outcome: Ongoing  Note: No injury has occurred this shift . Fall precautions in place and purposeful rounding. Problem: Skin Integrity:  Goal: Will show no infection signs and symptoms  Description: Will show no infection signs and symptoms  3/7/2022 0020 by Lisa Joseph RN  Outcome: Ongoing  Note: Pt has 3 left leg surgical incisions. Dressings are dry and intact and not due to be changed today. Afebrile. Patient has known UTI. Problem: Skin Integrity:  Goal: Absence of new skin breakdown  Description: Absence of new skin breakdown  Outcome: Ongoing  Note: Pt has 3 left leg surgical incisions. Dressings are dry and intact and not due to be changed today. Full skin assessment performed. Pt understands the importance of frequent repositioning in order to prevent any skin breakdown. Assisting patient to turn and reposition as tolerated to prevent skin breakdown. Problem: Pain:  Goal: Pain level will decrease  Description: Pain level will decrease  3/7/2022 0020 by Lisa Joseph RN  Outcome: Ongoing  Note:  Acute surgical left leg pain noted. Pain Assessment: 0-10  Pain Level: 0   Patient's Stated Pain Goal: No pain   Is pain goal met at this time? Yes   Pt states oral medication helping to achieve pain goal of a 3 on scale.    Non-Pharmaceutical Pain Intervention(s): Rest,Repositioned      Problem: Pain:  Goal: Control of acute pain  Description: Control of acute pain  Outcome: Ongoing  Note: Acute surgical left leg pain noted. Pain Assessment: 0-10  Pain Level: 0   Patient's Stated Pain Goal: No pain   Is pain goal met at this time? Yes   Pt states oral medication helping to achieve pain goal of a 3 on scale. Non-Pharmaceutical Pain Intervention(s): Rest,Repositioned      Problem: Pain:  Goal: Control of chronic pain  Description: Control of chronic pain  Outcome: Ongoing  Note: Patient has chronic back pain at times. Denies at this time. Problem: Activity:  Goal: Ability to ambulate will improve  Description: Ability to ambulate will improve  3/7/2022 0020 by Mary Gutierrez RN  Outcome: Ongoing  Note: Patient is up with 2 assist with walker  Problem: Nutritional:  Goal: Ability to attain and maintain optimal nutritional status will improve  Description: Ability to attain and maintain optimal nutritional status will improve  Outcome: Ongoing  Note: Patient is regular diet and tolerating without nausea. Poor PO intake noted. Encouraged PO intake. Problem: Physical Regulation:  Goal: Will remain free from infection  Description: Will remain free from infection  Outcome: Ongoing  Note: Afebrile. Left leg has 3 surgical incisions with small drainage. Encouraged IS. Problem: Respiratory:  Goal: Ability to maintain adequate ventilation will improve  Description: Ability to maintain adequate ventilation will improve  Outcome: Ongoing  Note:  No supplemental oxygen in use. Oxygen above 92% on room air. Denies SOB or chest pain. Will monitor. Problem: Self-Care:  Goal: Ability to meet self-care needs will improve  Description: Ability to meet self-care needs will improve  Outcome: Ongoing  Note: Patient able to verbalize needs. Assisting as they arise. Patient is able to be active in some activities of daily living.      Problem: Urinary Elimination:  Goal: Ability to reestablish a normal urinary elimination pattern will improve - after catheter removal  Description: Ability to reestablish a normal urinary elimination pattern will improve  3/7/2022 0020 by Carina Wilson RN  Outcome: Ongoing  Note: Patient has dx with UTI. Patient started on IV antibiotics. Patient is retaining urine. Patient to be straight cath. Problem: Cardiovascular  Goal: No DVT, peripheral vascular complications  Outcome: Ongoing  Note: Pt without s/s of DVT. Pt able to take prescribed anticoagulant (xarelto) to help prevent development of DVT. Problem: GI  Goal: No bowel complications  5/7/0484 [id number] by Carina Wilson RN  Outcome: Ongoing  Note: Pt with active bowel sounds, not passing flatus, and without nausea. Taking prescribed medications to assist with BM. No BM since surgery     Problem: Discharge Planning:  Goal: Discharged to appropriate level of care  Description: Discharged to appropriate level of care  3/7/2022 0020 by Carina Wilson RN  Outcome: Ongoing  Note: Patient is requesting IP rehab or home with [facility]. Patient may require ECF due to poor mobility and note being able to tolerate 3 hours of therapy. Patient is currently a 2 assist with robinson-steady. PT/OT notes needed.  and  workings towards discharge. . Patient can be weight bearing as tolerated. PT/OT on case. Care plan reviewed with patient. Patient verbalize understanding of the plan of care and contribute to goal setting.

## 2022-03-07 NOTE — FLOWSHEET NOTE
03/07/22 0014   Treatment Team Notification   Reason for Communication Review case  (. )   Team Member Name Dr. Rebekah Suárez Provider   Method of Communication Secure Message   Response See orders   Notification Time 0012     Patient voided 150 ml's and had a PVR of 447 ml's. Order received to strait cath patient. Levaquin 500 mg IV ordered Q24 to treat UTI.

## 2022-03-07 NOTE — PLAN OF CARE
Problem: Falls - Risk of:  Goal: Will remain free from falls  Description: Will remain free from falls  Outcome: Ongoing  Note: Pt using call light appropriately to call for assistance with ambulation to the [facility] and to chair. Pt is also compliant with use of non-skid slippers. Pt reports understanding of fall prevention when discussed. Problem: Falls - Risk of:  Goal: Absence of physical injury  Description: Absence of physical injury  Outcome: Ongoing  Note: Pt using call light appropriately to call for assistance with ambulation to the [facility] and to chair. Pt is also compliant with use of non-skid slippers. Pt reports understanding of fall prevention when discussed. Problem: Skin Integrity:  Goal: Will show no infection signs and symptoms  Description: Will show no infection signs and symptoms  Outcome: Ongoing  Note: Pt's surgical incision healing. Dressing is dry and intact and not due to be changed today. No other skin impairments noted. Pt understands the importance of frequent repositioning in order to prevent any skin breakdown. Problem: Skin Integrity:  Goal: Absence of new skin breakdown  Description: Absence of new skin breakdown  Outcome: Ongoing  Note: Pt's surgical incision healing. Dressing is dry and intact and not due to be changed today. No other skin impairments noted. Pt understands the importance of frequent repositioning in order to prevent any skin breakdown. Problem: Pain:  Goal: Pain level will decrease  Description: Pain level will decrease  Outcome: Ongoing  Note: Pt report pain at 7-10 on scale. Pt states oral medication helping to achieve pain goal of a 0 on scale. Problem: Pain:  Goal: Control of acute pain  Description: Control of acute pain  Outcome: Ongoing  Note: Pt report pain at 7-10 on scale. Pt states oral medication helping to achieve pain goal of a 0 on scale. Problem:  Activity:  Goal: Ability to ambulate will improve  Description: Ability to ambulate will improve  Outcome: Ongoing  Note: Up with 1-2 assist, walker, and gait belt. Requires assistance to mobilize out of bed, chair and to [facility]. Independent once set up for meals and bathing. Problem: Nutritional:  Goal: Ability to attain and maintain optimal nutritional status will improve  Description: Ability to attain and maintain optimal nutritional status will improve  Outcome: Ongoing  Note: Eating independently at meals and able to eat adequate amounts. Problem: Physical Regulation:  Goal: Will remain free from infection  Description: Will remain free from infection  Outcome: Ongoing  Note: Dressing dry and intact. Unable to visualize surgical incision due to surgical dressing. Dressing not to be removed today. No fevers, tachycardia, hypotension noted. Pt denies any complaints      Problem: Respiratory:  Goal: Ability to maintain adequate ventilation will improve  Description: Ability to maintain adequate ventilation will improve  Outcome: Ongoing  Note: Pt sats>90% on room air. No shortness of breath noted. Lungs clear, uses IS, and able to C&DB  as ordered. Problem: Self-Care:  Goal: Ability to meet self-care needs will improve  Description: Ability to meet self-care needs will improve  Outcome: Ongoing  Note: Up with 1-2 assist, walker, and gait belt. Requires assistance to mobilize out of bed, chair and to [facility]. Independent once set up for meals and bathing. Problem: Urinary Elimination:  Goal: Ability to reestablish a normal urinary elimination pattern will improve - after catheter removal  Description: Ability to reestablish a normal urinary elimination pattern will improve  Outcome: Ongoing  Note: Pt voiding adequate amounts without difficulty. Problem: Cardiovascular  Goal: No DVT, peripheral vascular complications  Outcome: Ongoing  Note: Pt without s/s of DVT. Pt able to take prescribed anticoagulants and SCD,S in place to help prevent development of DVT. Problem: GI  Goal: No bowel complications  Outcome: Ongoing  Note: Pt with bowel sounds, passing flatus, and without nausea. Taking prescribed medications to assist with BM      Problem: Discharge Planning:  Goal: Discharged to appropriate level of care  Description: Discharged to appropriate level of care  Outcome: Ongoing  Note: Pt plans inpt rehab? at discharge. Care manager and social working helping with discharge needs. Problem: Pain:  Goal: Control of chronic pain  Description: Control of chronic pain  Outcome: Completed  Note: No chronic pain   Care plan reviewed with patient and daughter/. Patient and daughter/ verbalize understanding of the plan of care and contribute to goal setting.

## 2022-03-07 NOTE — PROGRESS NOTES
Hospitalist Progress Note      Patient:  [facility]    Unit/Bed:7K-24/024-A  YOB: 1950  MRN: [id number]   Acct: [de-identified]   PCP: Magi Haley MD  Date of Admission: 3/3/2022    Assessment/Plan:    1. L femur fx s/p open treatment of a subtrochanteric fracture with a CMN 3/3:  Ortho primary and managing. WBAT. Pain control, pt sites uncontrolled pain with movement and weightbearing. PT/OT. Recommend SNF vs Inpatient Rehab but pt wants to go home. 2. Mild acute compression fx of L4: Ortho primary, treating conservatively with TLSO  3. Acute cystitis / acute urinary retention: UA returned last evening showing small LE, many bacteria, moderate budding yeast. Night RN updated nocturnist, Levaquin started. Start Diflucan, deescalate per final Ucx results / sensitivities. 4. Oliguria, resolved: likely multifactorial with reduced PO intake, dehydration, in addition to Benadryl (anticholingeric effect). Repeat bladder scan s/p IVFs. Stop Benadryl. 5. Constipation, resolved: continue with bowel regimen, no abd pain, n/v. Active bowel sounds. 6. Hypotension, intermittent: BP running low normal to hypotensive at times. No home therapies. Likely hypovolemic. IVFs, continue to monitor. 7. Itching: no apparent rash/ skin abnormality. Stop Benadryl. Order topical calamine PRN. 8. Hyponatremia: resolved, continue to monitor with BMP  9. Acute normocytic anemia: postoperative, no gross bleeding. Continue to monitor and transfuse for Hgb < 7 or hemodynamic instability. 10. Postoperative pulmonary insufficiency, resolved: continue IS, stable on RA  11. Disposition: consult placed to PM&R    Chief Complaint: SOB    Initial H and P:-    Per initial consult: \"The patient is a 66 y.o. female with significant past medical history of osteoarthritis is admitted for left femur fracture POD #1 s/p left intertrochanteric femur fx repair with Intertan.  She tripped over her feet at work [employer] and landed on her left hip and lower back.  She states that she was able to walk afterwards. Carroll Tello is NOT on any blood thinners.  She does not remember hitting her head. Surgery was uncomplicated. She developed sudden onset of SOB and bilateral calf pain. No cough. No other symptoms\"    3/6: pt doing alright, lying in bed with  at bedside. She reports signficant pain any time she moves or even if her foot is touched. Discussed the probability of pt being able to manage appropriately at home given this as she wants to consider home vs rehab. Educated regarding benefits of continued monitor and therapy especially given her current pain level and mobility concerns. Pt has had reduced urine output in addition to constipation x4-5 days. No abd pain, n/v/d. No fever/chills. No suprapubic pain. No CP/SOB. Subjective (past 24 hours):   3/7: pt doing well, sitting up in chair with sister at bedside. Overall improved today compared to yesterday with her mobility / pain. Afebrile x24 hours. Denies CP/SOB. Past medical history, family history, social history and allergies reviewed again and is unchanged since admission. ROS (All review of systems completed. Pertinent positives noted.  Otherwise All other systems reviewed and negative.)     Medications:  Reviewed    Infusion Medications    sodium chloride       Scheduled Medications    levofloxacin  500 mg IntraVENous Q24H    cefTRIAXone (ROCEPHIN) IV  1,000 mg IntraVENous Q24H    senna  2 tablet Oral BID    docusate sodium  100 mg Oral BID    polyethylene glycol  17 g Oral BID    sodium chloride flush  5-40 mL IntraVENous 2 times per day    acetaminophen  650 mg Oral Q6H    rivaroxaban  10 mg Oral Daily     PRN Meds: calcium carbonate, Calamine, diphenhydrAMINE-zinc acetate, magnesium hydroxide, bisacodyl, cyclobenzaprine, HYDROcodone-acetaminophen, sodium chloride flush, sodium chloride, ondansetron **OR** ondansetron, HYDROcodone 5 mg - acetaminophen, morphine      Intake/Output Summary (Last 24 hours) at 3/7/2022 1313  Last data filed at 3/7/2022 0736  Gross per 24 hour   Intake 2616.54 ml   Output 1050 ml   Net 1566.54 ml       Diet:  ADULT DIET; Regular    Exam:  BP (!) 100/54   Pulse 77   Temp 98.7 °F (37.1 °C) (Oral)   Resp 16   Ht 5' 2\" (1.575 m)   Wt 128 lb 11.2 oz (58.4 kg)   SpO2 94%   BMI 23.54 kg/m²   General appearance: elderly, frail, no apparent distress, appears stated age and cooperative. HEENT: Pupils equal, round, and reactive to light. Conjunctivae/corneas clear. Neck: Supple, with full range of motion. No jugular venous distention. Trachea midline. Respiratory:  Normal respiratory effort. Clear to auscultation, bilaterally without Rales/Wheezes/Rhonchi. Cardiovascular: Regular rate and rhythm with normal S1/S2 without murmurs, rubs or gallops. Abdomen: Soft, non-tender, non-distended with normal bowel sounds. Musculoskeletal: Reduced ROM of L hip, incisions with dressings CD&I  Back: TLSO brace in place   Skin: Skin color, texture, turgor normal.  No rashes or lesions. Neurologic:  Neurovascularly intact without any focal sensory/motor deficits. Cranial nerves: II-XII intact, grossly non-focal.  Psychiatric: Alert and oriented x4, thought content appropriate, normal insight  Capillary Refill: Brisk,< 3 seconds   Peripheral Pulses: +2 palpable, equal bilaterally     Labs:   Recent Labs     03/05/22 0548 03/05/22  0548 03/06/22 0445 03/07/22  0613 03/07/22  1218   WBC 6.9  --  9.5 6.0  --    HGB 9.8*   < > 10.2* 8.5* 8.9*   HCT 30.7*   < > 31.8* 26.5* 27.6*   *  --  175 147  --     < > = values in this interval not displayed.      Recent Labs     03/05/22  0548 03/06/22  0445 03/07/22  0613    133* 138   K 4.4 4.1 4.4    98 105   CO2 24 23 25   BUN 11 10 10   CREATININE 0.6 0.7 0.5   CALCIUM 8.4* 8.8 8.1*     No results for input(s): AST, ALT, BILIDIR, BILITOT, ALKPHOS in the last 72 hours. No results for input(s): INR in the last 72 hours. No results for input(s): Rafael Sleeper in the last 72 hours. Microbiology:    Blood culture #1: No results found for: BC    Blood culture #2:No results found for: [place]    Organism:No results found for: ORG    No results found for: LABGRAM    MRSA culture only:No results found for: [facility]    Urine culture: No results found for: LABURIN    Respiratory culture: No results found for: CULTRESP    Aerobic and Anaerobic :  No results found for: LABAERO  No results found for: LABANAE    Urinalysis:      Lab Results   Component Value Date    NITRU NEGATIVE 03/06/2022    WBCUA  03/06/2022    BACTERIA MANY 03/06/2022    RBCUA 15-20 03/06/2022    BLOODU LARGE 03/06/2022    [address] 1.012 03/06/2022    [address] NEGATIVE 08/04/2018       Radiology:  VL DUP LOWER EXTREMITY VENOUS BILATERAL   Final Result   No evidence of deep venous thrombosis in the bilateral lower extremities. **This report has been created using voice recognition software. It may contain minor errors which are inherent in voice recognition technology. **      Final report electronically signed by Dr. Олег Tariq MD on 3/5/2022 10:44 AM      CTA CHEST [address]   Final Result       1. No evidence of pulmonary embolus. 2. 7 mm nodule at the right major fissure. Consider follow-up CT in 6-12 months per Fleischner criteria. 3. Right hilar lymphadenopathy. **This report has been created using voice recognition software. It may contain minor errors which are inherent in voice recognition technology. **      Final report electronically signed by Dr. Олег Tariq MD on 3/5/2022 10:08 AM      CT LUMBAR SPINE WO CONTRAST   Final Result   Mild acute compression deformity of L4 with approximately 15% central height loss. **This report has been created using voice recognition software.  It may contain minor errors which are inherent in voice recognition technology. **      Final report electronically signed by Dr. Padma Tarango MD on 3/5/2022 10:39 AM      XR LUMBAR SPINE (2-3 VIEWS)   Final Result    Stable degenerative changes of the lumbar spine. **This report has been created using voice recognition software. It may contain minor errors which are inherent in voice recognition technology. **      Final report electronically signed by Dr. Padma Tarango MD on 3/4/2022 3:16 PM      FLUORO FOR SURGICAL PROCEDURES   Final Result      XR FEMUR LEFT (MIN 2 VIEWS)   Final Result   FINDINGS/IMPRESSION: Saved fluoroscopic images show intramedullary nail within the femur with cross threaded screws traversing the left femoral neck without evidence of hardware failure. There is anatomic alignment. **This report has been created using voice recognition software. It may contain minor errors which are inherent in voice recognition technology. **      Final report electronically signed by Dr. Padma Tarango MD on 3/5/2022 8:11 AM      CT HEAD WO CONTRAST   Final Result      No mass effect or acute hemorrhage. **This report has been created using voice recognition software. It may contain minor errors which are inherent in voice recognition technology. **      Final report electronically signed by Dr. Lauren Li on 3/3/2022 4:37 PM      CT CERVICAL SPINE WO CONTRAST   Final Result   1. No fracture or spondylolisthesis of the cervical spine. 2. Multilevel degenerative disc disease, neural foraminal narrowing and central canal stenosis as detailed above. Final report electronically signed by Dr. Lauren Li on 3/3/2022 4:41 PM      XR CHEST PORTABLE   Final Result      No acute intrathoracic process. **This report has been created using voice recognition software. It may contain minor errors which are inherent in voice recognition technology. **      Final report electronically signed by Dr. Denae Shelton on 3/3/2022 4:14 PM      XR HIP LEFT (2-3 VIEWS)   Final Result      Comminuted fracture of the proximal femur. Final report electronically signed by Dr. Denae Shelton on 3/3/2022 4:15 PM        XR LUMBAR SPINE (2-3 VIEWS)    Result Date: 3/4/2022  PROCEDURE: XR LUMBAR SPINE (2-3 VIEWS) CLINICAL INFORMATION: low back pain, fall 3/3, COMPARISON: MRI lumbar spine dated 9/26/2018. TECHNIQUE: AP and lateral views of the lumbar spine. FINDINGS: There is a minimal dextrocurvature of the lumbar spine, stable compared to prior MRI. There is grade 1 anterolisthesis of L4 relative to L5 on the basis of degenerative change, stable compared to prior exam. There is facet hypertrophy at the lower lumbar  levels, similar to prior MRI. There is diffuse demineralization. There is gaseous distention of large bowel. Stable degenerative changes of the lumbar spine. **This report has been created using voice recognition software. It may contain minor errors which are inherent in voice recognition technology. ** Final report electronically signed by Dr. Vasiliy Champion MD on 3/4/2022 3:16 PM    XR HIP LEFT (2-3 VIEWS)    Result Date: 3/3/2022  PROCEDURE: XR HIP LEFT (2-3 VIEWS) CLINICAL INFORMATION: Fall TECHNIQUE: 2 views of the left hip COMPARISON: None FINDINGS: There is a minimally displaced comminuted intertrochanteric fracture of the proximal femur. No dislocation is identified. There is soft tissue swelling adjacent to the fracture site. Comminuted fracture of the proximal femur. Final report electronically signed by Dr. Denae Shelton on 3/3/2022 4:15 PM    XR FEMUR LEFT (MIN 2 VIEWS)    Result Date: 3/5/2022  PROCEDURE: XR FEMUR LEFT (MIN 2 VIEWS) CLINICAL INFORMATION: left femur orif intertan . COMPARISON: Left hip series dated 3/3/2022. TECHNIQUE: Fluoroscopic support was provided for ORIF of left femur fracture. There are 3 saved fluoroscopic images. Fluoroscopy time = 62.7 seconds. FINDINGS/IMPRESSION: Saved fluoroscopic images show intramedullary nail within the femur with cross threaded screws traversing the left femoral neck without evidence of hardware failure. There is anatomic alignment. **This report has been created using voice recognition software. It may contain minor errors which are inherent in voice recognition technology. ** Final report electronically signed by Dr. Taylor Inman MD on 3/5/2022 8:11 AM    CT HEAD WO CONTRAST    Result Date: 3/3/2022  PROCEDURE: CT HEAD WO CONTRAST CLINICAL INFORMATION: Fall TECHNIQUE: CT scan of the head was performed from the vertex to the skull base without use of intravenous contrast. Axial images as well as coronal and sagittal reconstructions were obtained. All CT scans at this facility use dose modulation, iterative reconstruction, and/or weight-based dosing when appropriate to reduce radiation dose to as low as reasonably achievable. COMPARISON: CT head 8/4/2018 FINDINGS: There is no mass effect, midline shift or acute hemorrhage. Ventricles and CSF spaces are within normal limits. Gray-white matter differentiation is preserved. Visualized orbits, paranasal sinuses and mastoid air cells are unremarkable. No mass effect or acute hemorrhage. **This report has been created using voice recognition software. It may contain minor errors which are inherent in voice recognition technology. ** Final report electronically signed by Dr. Tarun Bernard on 3/3/2022 4:37 PM    CT CERVICAL SPINE WO CONTRAST    Result Date: 3/3/2022  PROCEDURE: CT CERVICAL SPINE WO CONTRAST CLINICAL INFORMATION: Fall TECHNIQUE: CT of the cervical spine was performed without use of intravenous contrast. Axial images as well as coronal and sagittal reconstructions were obtained.  All CT scans at this facility use dose modulation, iterative reconstruction, and/or weight-based dosing when appropriate to reduce radiation dose to as low as reasonably achievable. COMPARISON: None FINDINGS: There is straightening of normal cervical lordosis. Disc space narrowing and osteophyte formation are present at the C4-C5, C5-C6 and C6-C7 levels. Cervical vertebral body heights are preserved. There is no fracture or spondylolisthesis. The atlantodental interval is normal. Neural foraminal narrowing is present in the bilateral C3-C4, C4-C5, C5-C6 and C6-C7 levels. There is mild central canal stenosis at these levels. Atherosclerotic calcifications are present in the bilateral extracranial carotid arteries. There is minimal scarring at the bilateral lung apices. 1. No fracture or spondylolisthesis of the cervical spine. 2. Multilevel degenerative disc disease, neural foraminal narrowing and central canal stenosis as detailed above. Final report electronically signed by Dr. Mal Roe on 3/3/2022 4:41 PM    XR CHEST PORTABLE    Result Date: 3/3/2022  PROCEDURE: XR CHEST PORTABLE CLINICAL INFORMATION: Fall TECHNIQUE: Mobile AP chest radiograph. COMPARISON: PA and lateral chest radiographs 4/18/2019 FINDINGS: The costophrenic angles and the upper abdomen are excluded from the submitted image. Cardiac silhouette is within normal limits. Atherosclerotic allegations are present in the thoracic aorta. There are no lung consolidations. Degenerative changes in the thoracic spine are poorly visualized. Soft tissues are unremarkable. No acute intrathoracic process. **This report has been created using voice recognition software. It may contain minor errors which are inherent in voice recognition technology. ** Final report electronically signed by Dr. Mal Roe on 3/3/2022 4:14 PM    FLUORO FOR SURGICAL PROCEDURES    Result Date: 3/3/2022  Radiology exam is complete. No Radiologist dictation. Please follow up with ordering provider.        Electronically signed by Talisha Cruz PA-C on 3/7/2022 at 1:13 PM

## 2022-03-07 NOTE — PROGRESS NOTES
[address] [address]  INPATIENT PHYSICAL THERAPY  DAILY NOTE  [facility] ORTHOPEDICS 7K - 2Z-07/802-S  Co-rx with OT due to pt hx of requiring inc assist however tolerated much better this session and will separate OT and PT sessions from now on  Time In: 1008  Time Out: 1032  Timed Code Treatment Minutes: 24 Minutes  Minutes: 24          Date: 3/7/2022  Patient Name: Sana Davidson,  Gender:  female        MRN: [id number]  : 1950  (66 y.o.)     Referring Practitioner: Jean Claude Johnson MD  Diagnosis: Closed fracture of left hip  Additional Pertinent Hx: Sana Davidson is an 66 y.o.  female who was at [facility]note 3[facility] today and was reaching up for a toilet seat when she fell over and landed on her left side. She immediately had back and left hip pain and presented to the ED as she was unable to bear weight on her left leg. She denies distal paresthesias. Xray confirmed a left minimally displaced comminuted intertrochanteric fracture of the proximal femur. Pt is s/p L hip ORIF 3/3 by Dr Aaron Valderrama. Prior Level of Function:  Lives With: Spouse  Type of Home: House  Home Layout: One level  Home Access: Stairs to enter without rails  Entrance Stairs - Number of Steps: 1  Home Equipment: Rolling walker   Bathroom Shower/Tub: Walk-in shower (3in threshold into shower)  Bathroom Toilet: Handicap height  Bathroom Equipment: Grab bars in shower,Shower chair  Bathroom Accessibility: Accessible    ADL Assistance: [address] [place] [address] Avenue: Independent  Homemaking Responsibilities: Yes  Ambulation Assistance: Independent  Transfer Assistance: Independent  Active :  Yes  Additional Comments: Pt amb without AD    Restrictions/Precautions:  Restrictions/Precautions: Weight Bearing,Fall Risk  Left Lower Extremity Weight Bearing: Weight Bearing As Tolerated   Lumbar corset  SUBJECTIVE: cooperative, pleasant, spouse supportive but left foor while therapy worked with pt    PAIN: no pain at rest, LLE pain with mobility that pt stated was better than previous sessions, pt received pain meds around 30 min prior to session    Vitals: Vitals not assessed per clinical judgement, see nursing flowsheet    OBJECTIVE:  Bed Mobility:  Rolling to Right: Minimal Assistance, X 1, CGAx1, cues for sequencing  Supine to Sit: Minimal Assistance, X 1, CGAx1, cues for sequencing, HOB up 30 degrees and use BR  Scooting: Minimal Assistance, X 1, fwd in sitting to EOB and bwd in chair, assist at LLE    Transfers:  Sit to Stand: [employer], X 2  Stand to [address] [address], X 2  Cues for hand placement and to back up fully to chair prior to sitting down for safety  Ambulation:  Contact Guard Assistance, X 2  Distance: 3'x1  Surface: Level Tile  Device:Rolling Walker  Gait Deviations: Forward Flexed Posture, Slow Antonia, Decreased Step Length Bilaterally, Decreased Weight Shift Left, Narrow Base of Support and Unsteady Gait    Balance:  Static Sitting Balance:  Stand By Assistance, OT ed with pt for donning lumbar corset  Static Standing Balance: Contact Guard Assistance, X 2, with RW    Exercise:  Patient was guided in 1 set(s) 10 reps of exercise to both lower extremities. Glut sets, Seated heel/toe raises and Long arc quads with assist at LLE. Exercises were completed for increased independence with functional mobility. Functional Outcome Measures: Completed  AM-PAC Inpatient Mobility Raw Score : 15  AM-PAC Inpatient T-Scale Score : 39.45    ASSESSMENT:  Assessment: Patient progressing toward established goals. and pt progressing with amb and dec assist required  Activity Tolerance:  Patient tolerance of  treatment: good. Equipment Recommendations: Other: will monitor needs pending progress  Discharge Recommendations: Continue to assess pending progress, Patient would benefit from continued PT at discharge and Inpatient Therapy Stay  Plan: Times per week: 6x O  Current Treatment Recommendations: Strengthening,Home Exercise Program,Safety Education & DARREN Montemayor Education & Training,Functional Mobility Training,Balance Training,Transfer Training    Patient Education  Patient Education: Home Exercise Program, [employer] Mobility, Transfers, Gait    Goals:  Patient goals : to get better  Short term goals  Time Frame for Short term goals: by discharge  Short term goal 1: Pt to transfer supine <--> sit min A to enable pt to get in/out of bed. Short term goal 2: Pt to transfer sit <--> stand CGA for increased functional mobility. Short term goal 3: bed<>chair with LRD CGA x 1 for safe transfers  Short term goal 4: amb >10'x1 with walker and CGA to walk safely to bathroom  Long term goals  Time Frame for Long term goals : NA due to short length of stay. Following session, patient left in safe position with all fall risk precautions in place.

## 2022-03-07 NOTE — PROGRESS NOTES
[address] [facility]  Occupational Therapy  Daily Note  Time:   Time In: 1006  Time Out: 1035  Timed Code Treatment Minutes: 23 Minutes  Minutes: 29      *Co-treatment with PT d/t poor pain tolerance with all activity. No further co-treatments needed d/t good progress with therapy this session. Date: 3/7/2022  Patient Name: Cate Sloan,   Gender: female      Room: [address] [place] [address]  MRN: [id number]  : 1950  (66 y.o.)  Referring Practitioner: Mary Craven MD  Diagnosis: Closed Fracture of left Hip  Additional Pertinent Hx: Cate Sloan is an 66 y.o.  female who was at [facility] today and was reaching up for a toilet seat when she fell over and landed on her left side. She immediately had back and left hip pain and presented to the ED as she was unable to bear weight on her left leg. She denies distal paresthesias. Xray confirmed a left minimally displaced comminuted intertrochanteric fracture of the proximal femur. Pt is s/p L hip ORIF 3/3 by Dr Casie Suresh. Restrictions/Precautions:  Restrictions/Precautions: Weight Bearing,Fall Risk  Left Lower Extremity Weight Bearing: Weight Bearing As Tolerated  Required Braces or Orthoses  Spinal: Lumbar Corset  Position Activity Restriction  Spinal Precautions: No Bending,No Lifting,No Twisting     SUBJECTIVE: RN okayed OT session. Upon arrival patient was sitting up in bed visiting with spouse. Pt was agreeable to OT session. PAIN: Pt denies pain at rest.     Vitals: Vitals not assessed per clinical judgement, see nursing flowsheet    COGNITION: WFL    ADL:   Upper Extremity Dressing: Min Assist to don lumbar corset seated EOB. Lower Extremity Dressing: Dependent. to don B socks. BALANCE:  Sitting Balance:  Stand By Assistance. seated EOB. Standing Balance: Contact Guard Assistance, X 2. BUE on walker.      BED MOBILITY:  Supine to Sit: Contact Guard Assistance, Minimal Assistance, X 1, with increased time for completion    Scooting: Minimal Assistance, X 1      TRANSFERS:  Sit to Stand:  Contact Guard Assistance, X 2, with increased time for completion, cues for hand placement. Stand to Sit: Contact Guard Assistance, X 2, with increased time for completion, cues for hand placement. FUNCTIONAL MOBILITY:  Assistive Device: Rolling Walker  Assist Level:  Contact Guard Assistance and X 2. Distance: 3 feet   Slow pace, unsteady      ADDITIONAL ACTIVITIES:  Pt completed B UE exs to increase strength required to complete ADL routine, x 1 set, x 10-15 reps, no resistance: shoulder flexion, chest press, bicep curls, horizontal AB/ADduction. slow pace, exhibits min fatigue, requires 1 RBs during exs. ASSESSMENT:   Activity Tolerance:  Patient tolerance of  treatment: good. Discharge Recommendations: Continue to assess pending progress, Patient would benefit from continued OT at discharge and Recommend Physiatry Consult  Equipment Recommendations: Equipment Needed: Yes  Other: RW. monitor need for Estevan Penaloza. Plan: Times per week: 6x  Current Treatment Recommendations: Strengthening,Balance Training,Functional Mobility Training,Endurance Training,Self-Care / ADL,Safety Education & Training,Patient/Caregiver Education & Training,Equipment Evaluation, Education, & procurement,Home Management Training    Patient Education  Patient Education: Role of OT, Plan of Care, ADL's, Importance of Increasing Activity and Assistive Device Safety    Goals  Short term goals  Time Frame for Short term goals: Until discharge  Short term goal 1: Pt will complete BUE light resistive exercises with min vcs for technique to increase indep and safety with all self cares. Short term goal 2: Pt will complete standing tolerance x 4 minutes with min A +1 and min vcs for safety to increase indep with sinkside grooming. Short term goal 3: Pt will complete LB dressing with LHAE PRN and Mod A to increase indep within home environment.   Short term goal 4: Pt will complete sit to stands transfers with min A +1 and min vcs for safety to increase indep with toileting. Short term goal 5: OTR to assess functional mobility. Revised Short-Term Goals  Short term goals  Time Frame for Short term goals: Until discharge  Short term goal 1: Pt will complete BUE light resistive exercises with min vcs for technique to increase indep and safety with all self cares. Short term goal 2: Pt will complete standing tolerance x 4 minutes with min A +1 and min vcs for safety to increase indep with sinkside grooming. Short term goal 3: Pt will complete LB dressing with LHAE PRN and Mod A to increase indep within home environment. Short term goal 4: Pt will complete sit to stands transfers with min A +1 and min vcs for safety to increase indep with toileting. Short term goal 5: Pt will complete functional mobility short distances with SBA and min vcs for safety to increase indep with toileting. Following session, patient left in safe position with all fall risk precautions in place.

## 2022-03-07 NOTE — PROCEDURES
A Bladder scan was performed at 0739 . The patient's last void was at unable to void . The residual amount was measured to be 400 ML. Report of results was given to [facility].

## 2022-03-08 LAB
ANION GAP SERPL CALCULATED.3IONS-SCNC: 10 MEQ/L (ref 8–16)
BUN BLDV-MCNC: 11 MG/DL (ref 7–22)
CALCIUM SERPL-MCNC: 8.4 MG/DL (ref 8.5–10.5)
CHLORIDE BLD-SCNC: 103 MEQ/L (ref 98–111)
CO2: 23 MEQ/L (ref 23–33)
CREAT SERPL-MCNC: 0.6 MG/DL (ref 0.4–1.2)
ERYTHROCYTE [DISTWIDTH] IN BLOOD BY AUTOMATED COUNT: 13.3 % (ref 11.5–14.5)
ERYTHROCYTE [DISTWIDTH] IN BLOOD BY AUTOMATED COUNT: 44 FL (ref 35–45)
GFR SERPL CREATININE-BSD FRML MDRD: > 90 ML/MIN/1.73M2
GLUCOSE BLD-MCNC: 92 MG/DL (ref 70–108)
HCT VFR BLD CALC: 24.3 % (ref 37–47)
HCT VFR BLD CALC: 27.2 % (ref 37–47)
HEMOGLOBIN: 7.9 GM/DL (ref 12–16)
HEMOGLOBIN: 8.9 GM/DL (ref 12–16)
MCH RBC QN AUTO: 29.4 PG (ref 26–33)
MCHC RBC AUTO-ENTMCNC: 32.5 GM/DL (ref 32.2–35.5)
MCV RBC AUTO: 90.3 FL (ref 81–99)
ORGANISM: ABNORMAL
PLATELET # BLD: 170 THOU/MM3 (ref 130–400)
PMV BLD AUTO: 9.1 FL (ref 9.4–12.4)
POTASSIUM REFLEX MAGNESIUM: 4 MEQ/L (ref 3.5–5.2)
RBC # BLD: 2.69 MILL/MM3 (ref 4.2–5.4)
SODIUM BLD-SCNC: 136 MEQ/L (ref 135–145)
URINE CULTURE, ROUTINE: ABNORMAL
WBC # BLD: 5 THOU/MM3 (ref 4.8–10.8)

## 2022-03-08 PROCEDURE — 85018 HEMOGLOBIN: CPT

## 2022-03-08 PROCEDURE — 97116 GAIT TRAINING THERAPY: CPT

## 2022-03-08 PROCEDURE — 6360000002 HC RX W HCPCS: Performed by: HOSPITALIST

## 2022-03-08 PROCEDURE — 6370000000 HC RX 637 (ALT 250 FOR IP): Performed by: PHYSICIAN ASSISTANT

## 2022-03-08 PROCEDURE — 85027 COMPLETE CBC AUTOMATED: CPT

## 2022-03-08 PROCEDURE — 97110 THERAPEUTIC EXERCISES: CPT

## 2022-03-08 PROCEDURE — 85014 HEMATOCRIT: CPT

## 2022-03-08 PROCEDURE — 6370000000 HC RX 637 (ALT 250 FOR IP): Performed by: ORTHOPAEDIC SURGERY

## 2022-03-08 PROCEDURE — 1200000000 HC SEMI PRIVATE

## 2022-03-08 PROCEDURE — 97535 SELF CARE MNGMENT TRAINING: CPT

## 2022-03-08 PROCEDURE — 94760 N-INVAS EAR/PLS OXIMETRY 1: CPT

## 2022-03-08 PROCEDURE — 36415 COLL VENOUS BLD VENIPUNCTURE: CPT

## 2022-03-08 PROCEDURE — 80048 BASIC METABOLIC PNL TOTAL CA: CPT

## 2022-03-08 PROCEDURE — 2580000003 HC RX 258: Performed by: ORTHOPAEDIC SURGERY

## 2022-03-08 RX ORDER — NITROFURANTOIN 25; 75 MG/1; MG/1
100 CAPSULE ORAL EVERY 12 HOURS SCHEDULED
Status: DISCONTINUED | OUTPATIENT
Start: 2022-03-08 | End: 2022-03-11 | Stop reason: HOSPADM

## 2022-03-08 RX ADMIN — NITROFURANTOIN (MONOHYDRATE/MACROCRYSTALS) 100 MG: 75; 25 CAPSULE ORAL at 11:22

## 2022-03-08 RX ADMIN — HYDROCODONE BITARTRATE AND ACETAMINOPHEN 2 TABLET: 5; 325 TABLET ORAL at 03:52

## 2022-03-08 RX ADMIN — SODIUM CHLORIDE, PRESERVATIVE FREE 10 ML: 5 INJECTION INTRAVENOUS at 08:55

## 2022-03-08 RX ADMIN — LEVOFLOXACIN 500 MG: 5 INJECTION, SOLUTION INTRAVENOUS at 02:16

## 2022-03-08 RX ADMIN — HYDROCODONE BITARTRATE AND ACETAMINOPHEN 2 TABLET: 5; 325 TABLET ORAL at 08:40

## 2022-03-08 RX ADMIN — HYDROCODONE BITARTRATE AND ACETAMINOPHEN 2 TABLET: 5; 325 TABLET ORAL at 13:28

## 2022-03-08 RX ADMIN — POLYETHYLENE GLYCOL 3350 17 G: 17 POWDER, FOR SOLUTION ORAL at 08:40

## 2022-03-08 RX ADMIN — RIVAROXABAN 10 MG: 10 TABLET, FILM COATED ORAL at 18:33

## 2022-03-08 RX ADMIN — FLUCONAZOLE 200 MG: 200 TABLET ORAL at 08:40

## 2022-03-08 RX ADMIN — SENNOSIDES 17.2 MG: 8.6 TABLET ORAL at 08:40

## 2022-03-08 RX ADMIN — SODIUM CHLORIDE, PRESERVATIVE FREE 10 ML: 5 INJECTION INTRAVENOUS at 20:12

## 2022-03-08 RX ADMIN — ACETAMINOPHEN 350 MG: 325 TABLET ORAL at 12:23

## 2022-03-08 RX ADMIN — DOCUSATE SODIUM 100 MG: 100 CAPSULE, LIQUID FILLED ORAL at 08:40

## 2022-03-08 RX ADMIN — ACETAMINOPHEN 650 MG: 325 TABLET ORAL at 05:57

## 2022-03-08 RX ADMIN — NITROFURANTOIN (MONOHYDRATE/MACROCRYSTALS) 100 MG: 75; 25 CAPSULE ORAL at 20:11

## 2022-03-08 RX ADMIN — HYDROCODONE BITARTRATE AND ACETAMINOPHEN 2 TABLET: 5; 325 TABLET ORAL at 18:23

## 2022-03-08 ASSESSMENT — PAIN SCALES - GENERAL
PAINLEVEL_OUTOF10: 8
PAINLEVEL_OUTOF10: 7
PAINLEVEL_OUTOF10: 5
PAINLEVEL_OUTOF10: 3
PAINLEVEL_OUTOF10: 7
PAINLEVEL_OUTOF10: 8
PAINLEVEL_OUTOF10: 8

## 2022-03-08 NOTE — PROGRESS NOTES
[address] [facility]  Occupational Therapy  Daily Note  Time:   Time In: 5086  Time Out: 1057  Timed Code Treatment Minutes: 25 Minutes  Minutes: 25          Date: 3/8/2022  Patient Name: Mark Anthony Bearden,   Gender: female      Room: -24/024-A  MRN: [id number]  : 1950  (66 y.o.)  Referring Practitioner: Leidy Hemphill MD  Diagnosis: Closed Fracture of left Hip  Additional Pertinent Hx: Mark Anthony Bearden is an 66 y.o.  female who was at [facility] today and was reaching up for a toilet seat when she fell over and landed on her left side. She immediately had back and left hip pain and presented to the ED as she was unable to bear weight on her left leg. She denies distal paresthesias. Xray confirmed a left minimally displaced comminuted intertrochanteric fracture of the proximal femur. Pt is s/p L hip ORIF 3/3 by Dr Nga Acosta. Restrictions/Precautions:  Restrictions/Precautions: Weight Bearing,Fall Risk  Left Lower Extremity Weight Bearing: Weight Bearing As Tolerated  Required Braces or Orthoses  Spinal: Lumbar Corset  Position Activity Restriction  Spinal Precautions: No Bending,No Lifting,No Twisting     SUBJECTIVE: RN approved OT session. Upon entering room pt seated in recliner chair, agreeable to OT session pt pleasant and cooperative. PAIN: 5/10 with movement in L hip     Vitals: Vitals not assessed per clinical judgement, see nursing flowsheet    COGNITION: WFL    ADL:   Toilet Transfer: Minimal Assistance. Nursing staff present, assisted patient and staff in toileting transfer . BALANCE:  Standing Balance: Minimal Assistance. with RW due to increased pain    BED MOBILITY:  Not Tested    TRANSFERS:  Sit to Stand:  Minimal Assistance. dyue to decreased strength  Stand to Sit: Minimal Assistance. due to decreased strength    FUNCTIONAL MOBILITY:  Assistive Device: Rolling Walker  Assist Level:  Minimal Assistance.    Distance: recliner to [facility] with nursing staff present to assist in transfer  Slow pace       ADDITIONAL ACTIVITIES:  Pt completed BUE exercises with theraband to increase strength for ADls. Pt completed B UE exs with light resistance band x 10 reps, x 1 set seated, while following verbal cues. good tolerance of exs, with minimal RBs throughout, and  cues for tech with resistance band. ASSESSMENT:     Activity Tolerance:  Patient tolerance of  treatment: fair. Slow pace      Discharge Recommendations: Continue to assess pending progress  Equipment Recommendations: Equipment Needed: Yes  Other: RW. monitor need for LHAE. Plan: Times per week: 6x  Current Treatment Recommendations: Strengthening,Balance Training,Functional Mobility Training,Endurance Training,Self-Care / ADL,Safety Education & Training,Patient/Caregiver Education & Training,Equipment Evaluation, Education, & procurement,Home Management Training    Patient Education  Patient Education: Role of OT, Plan of Care and ADL's    Goals  Short term goals  Time Frame for Short term goals: Until discharge  Short term goal 1: Pt will complete BUE light resistive exercises with min vcs for technique to increase indep and safety with all self cares. Short term goal 2: Pt will complete standing tolerance x 4 minutes with min A +1 and min vcs for safety to increase indep with sinkside grooming. Short term goal 3: Pt will complete LB dressing with LHAE PRN and Mod A to increase indep within home environment. Short term goal 4: Pt will complete sit to stands transfers with min A +1 and min vcs for safety to increase indep with toileting. Short term goal 5: Pt will complete functional mobility short distances with SBA and min vcs for safety to increase indep with toileting. Following session, patient left in safe position with all fall risk precautions in place.

## 2022-03-08 NOTE — PLAN OF CARE
Planning inpatient rehab when medically stable / insurance approved. Change Rocephin to Macrobid per sensitivities. Growing Enterococcus faecalis - (Group D). Hgb trending down. Recheck H&H in afternoon. Hospitalist will continue to follow.      Electronically signed by Kateri Aschoff, PA-C on 3/8/2022 at 7:51 AM

## 2022-03-08 NOTE — PROGRESS NOTES
Orthopaedic Progress Note      SUBJECTIVE:    Chief Complaint   Patient presents with    Hip Pain     left hip s/p mechanical fall    POD 5 ORIF left hip IMN  Seen up in chair. No new ortho complaints. Physical    Vitals:    03/08/22 0845   BP: (!) 108/90   Pulse: 73   Resp: 16   Temp:    SpO2: 95%         OBJECTIVE  LLE dressings c/d/i. Hip and knee motion improving. Flex and extends toes and ankle. DP 2+.  SILT      Data  CBC:   Lab Results   Component Value Date    WBC 5.0 03/08/2022    RBC 2.69 03/08/2022    HGB 7.9 03/08/2022    HCT 24.3 03/08/2022    MCV 90.3 03/08/2022    MCH 29.4 03/08/2022    MCHC 32.5 03/08/2022    RDW 13.5 08/25/2014     03/08/2022    MPV 9.1 03/08/2022     BMP:    Lab Results   Component Value Date     03/08/2022    K 4.0 03/08/2022     03/08/2022    CO2 23 03/08/2022    BUN 11 03/08/2022    LABALBU 4.2 08/04/2018    CREATININE 0.6 03/08/2022    CALCIUM 8.4 03/08/2022    LABGLOM >90 03/08/2022    GLUCOSE 92 03/08/2022     Uric Acid:  No components found for: URIC  PT/INR:    Lab Results   Component Value Date    INR 1.02 03/03/2022     PTT:    Lab Results   Component Value Date    APTT 28.0 03/03/2022   [APTT  Troponin:  No results found for: TROPONINI  Urine Culture:  No components found for: CURINE    Current Inpatient Medications    Current Facility-Administered Medications: nitrofurantoin (macrocrystal-monohydrate) (MACROBID) capsule 100 mg, 100 mg, Oral, 2 times per day  fluconazole (DIFLUCAN) tablet 200 mg, 200 mg, Oral, Daily  calcium carbonate (TUMS) chewable tablet 1,000 mg, 1,000 mg, Oral, TID PRN  Calamine 8-8 % lotion, , Topical, 4x Daily PRN  diphenhydrAMINE-zinc acetate cream, , Topical, TID PRN  senna (SENOKOT) tablet 17.2 mg, 2 tablet, Oral, BID  docusate sodium (COLACE) capsule 100 mg, 100 mg, Oral, BID  magnesium hydroxide (MILK OF MAGNESIA) 400 MG/5ML suspension 30 mL, 30 mL, Oral, BID PRN  polyethylene glycol (GLYCOLAX) packet 17 g, 17 g, Oral, BID  bisacodyl (DULCOLAX) suppository 10 mg, 10 mg, Rectal, Daily PRN  cyclobenzaprine (FLEXERIL) tablet 10 mg, 10 mg, Oral, TID PRN  HYDROcodone-acetaminophen (NORCO) 5-325 MG per tablet 2 tablet, 2 tablet, Oral, Q4H PRN  sodium chloride flush 0.9 % injection 5-40 mL, 5-40 mL, IntraVENous, 2 times per day  sodium chloride flush 0.9 % injection 5-40 mL, 5-40 mL, IntraVENous, PRN  0.9 % sodium chloride infusion, 25 mL, IntraVENous, PRN  acetaminophen (TYLENOL) tablet 650 mg, 650 mg, Oral, Q6H  ondansetron (ZOFRAN-ODT) disintegrating tablet 4 mg, 4 mg, Oral, Q8H PRN **OR** ondansetron (ZOFRAN) injection 4 mg, 4 mg, IntraVENous, Q6H PRN  rivaroxaban (XARELTO) tablet 10 mg, 10 mg, Oral, Daily  HYDROcodone-acetaminophen (NORCO) 5-325 MG per tablet 1 tablet, 1 tablet, Oral, Q4H PRN  morphine (PF) injection 2 mg, 2 mg, IntraVENous, Q2H PRN    . ASSESSMENT AND PLAN  WBAT LLE  Continue therapy  DVT ppx  Pain control  Discharge pending precert.

## 2022-03-08 NOTE — FLOWSHEET NOTE
[address] [address] PROGRESS NOTE      Patient: Pastor Ramos  Room #: 4M-27/525-Z            YOB: 1950  Age: 66 y.o. Gender: female            Admit Date & Time: 3/3/2022  3:26 PM    Assessment:  Pt was laying in bed. Pt has two daughters, one lives in [place] and the other [place]. Pt feels lonely and became tearful when  explored these feelings. One daughter has come to town during this illness. Pt believes in God, but does not belong to a [employer]. Pt shared that she had cramping in her legs. Interventions:   provided a listening and supportive presence.  explored pt's beliefs and support network.  notified nurse of pt's leg cramps. Outcomes:  Pt seemed in better spirits at the end of the encounter. Plan:  1. Provide spiritual care and support. 2.  Continue exploring pt's feelings of loneliness. Electronically signed by Sina Mendieta on 3/7/2022 at 10:28 PM.  [facility]  [phone]       03/07/22 2149   Encounter Summary   Services provided to: Patient   Referral/Consult From: [facility]   Support System Children   Continue Visiting Yes  (3/7)   Complexity of Encounter Moderate   Length of Encounter 30 minutes   Spiritual Assessment Completed Yes   Spiritual/Mandaeism   Type Spiritual struggle   Assessment Approachable; Anxious; Loneliness   Intervention Explored feelings, thoughts, concerns; Active listening;Explored coping resources;Sustaining presence/ Ministry of presence   Outcome Connection/belonging;Comfort;Engaged in conversation;Expressed feelings/needs/concerns

## 2022-03-08 NOTE — PROGRESS NOTES
[facility]  INPATIENT PHYSICAL THERAPY  DAILY NOTE  [facility] ORTHOPEDICS 7K - 1D-77/213-E    Time In: 7591  Time Out: 4511  Timed Code Treatment Minutes: 45 Minutes  Minutes: 38          Date: 3/8/2022  Patient Name: Karthik Kumar,  Gender:  female        MRN: [id number]  : 1950  (66 y.o.)     Referring Practitioner: Gregoria Avery MD  Diagnosis: Closed fracture of left hip  Additional Pertinent Hx: Karthik Kumar is an 66 y.o.  female who was at [facility] today and was reaching up for a toilet seat when she fell over and landed on her left side. She immediately had back and left hip pain and presented to the ED as she was unable to bear weight on her left leg. She denies distal paresthesias. Xray confirmed a left minimally displaced comminuted intertrochanteric fracture of the proximal femur. Pt is s/p L hip ORIF 3/3 by Dr Mary Trivedi. Prior Level of Function:  Lives With: Spouse  Type of Home: House  Home Layout: One level  Home Access: Stairs to enter without rails  Entrance Stairs - Number of Steps: 1  Home Equipment: Rolling walker   Bathroom Shower/Tub: Walk-in shower (3in threshold into shower)  Bathroom Toilet: Handicap height  Bathroom Equipment: Grab bars in shower,Shower chair  Bathroom Accessibility: Accessible    ADL Assistance: [address] [place] [address] Avenue: Independent  Homemaking Responsibilities: Yes  Ambulation Assistance: Independent  Transfer Assistance: Independent  Active : Yes  Additional Comments: Pt amb without AD    Restrictions/Precautions:  Restrictions/Precautions: Weight Bearing,Fall Risk  Left Lower Extremity Weight Bearing: Weight Bearing As Tolerated  Required Braces or Orthoses  Spinal: Lumbar Corset  Position Activity Restriction  Spinal Precautions: No Bending,No Lifting,No Twisting     SUBJECTIVE: RN approved therapy session. Patient seated in bedside chair upon arrival. Patient pleasant and agreeable to therapy.     PAIN: 8/10: L hip    Vitals: Vitals not assessed per clinical judgement, see nursing flowsheet    OBJECTIVE:  Bed Mobility:  Not Tested    Transfers:  Sit to Stand: Minimal Assistance, with increased time for completion  [facility], with increased time for completion    Ambulation:  Contact Guard Assistance, with increased time for completion  Distance: 8'x1  Surface: Level Tile  Device:Rolling Walker  Gait Deviations: Forward Flexed Posture, Slow Antonia, Decreased Step Length on Right, Decreased Weight Shift Left, Decreased Gait Speed, Decreased Heel Strike Bilaterally and patient needed VC for increased step length with R LE with poor follow through. Exercise:  Patient was guided in 1 set(s) 10 reps of exercise to both lower extremities. Heelslides, Seated marches, Seated heel/toe raises, Long arc quads, Seated abduction/adduction and AAROM for LLE with seated marches. .  Exercises were completed for increased independence with functional mobility. Functional Outcome Measures: Completed  AM-PAC Inpatient Mobility Raw Score : 15  AM-PAC Inpatient T-Scale Score : 39.45    ASSESSMENT:  Assessment: Patient progressing toward established goals. Activity Tolerance:  Patient tolerance of  treatment: good. Patient limited by decreased strength and decreased endurance. Equipment Recommendations: Other: will monitor needs pending progress  Discharge Recommendations: Inpatient Therapy Stay  Plan: Times per week: 6x O  Current Treatment Recommendations: Strengthening,Home Exercise Program,Safety Education & Training,ROM,Patient/Caregiver Education & Training,Functional Mobility Training,Balance Training,Transfer Training    Patient Education  Patient Education: Plan of Care, Gait, Verbal Exercise Instruction    Goals:  Patient goals : to get better  Short term goals  Time Frame for Short term goals: by discharge  Short term goal 1: Pt to transfer supine <--> sit min A to enable pt to get in/out of bed.  Short term goal 2: Pt to transfer sit <--> stand CGA for increased functional mobility. Short term goal 3: bed<>chair with LRD CGA x 1 for safe transfers  Short term goal 4: amb >10'x1 with walker and CGA to walk safely to bathroom  Long term goals  Time Frame for Long term goals : NA due to short length of stay. Following session, patient left in safe position with all fall risk precautions in place.

## 2022-03-08 NOTE — CARE COORDINATION
3/8/22, 12:19 PM EST    DISCHARGE ON GOING EVALUATION    [facility] day: 5  Barriers to Discharge: POD 5. Await precert. Continues to work with therapy. PCP: Maisha Redd MD  Readmission Risk Score: 10.7 ( )%  Patient Goals/Plan/Treatment Preferences: IPR precert started today.

## 2022-03-08 NOTE — PROGRESS NOTES
[facility] THERAPY MISSED TREATMENT NOTE  [facility] ORTHOPEDICS 7K      Date: 3/8/2022  Patient Name: Jacqueline Garcia        MRN: [id number]    : 1950  (66 y.o.)    REASON FOR MISSED TREATMENT:  ST attempted to complete cognitive evaluation following order placed by Charan Lombardi MD, however, upon ST arrival patient completing nursing needs with RN Claire Rueda. ST will attempt to complete cognitive evaluation 3/9 as clinically indicated to assist with consideration for admission to [facility].     Wander Rincon M.S., [facility]

## 2022-03-08 NOTE — CARE COORDINATION
3/8/22, 5:02 PM EST    DISCHARGE PLANNING EVALUATION      Planning inpt rehab, full assessment deferred. Will follow if alternative plan is needed.

## 2022-03-09 LAB
ANION GAP SERPL CALCULATED.3IONS-SCNC: 10 MEQ/L (ref 8–16)
BUN BLDV-MCNC: 12 MG/DL (ref 7–22)
CALCIUM SERPL-MCNC: 8.6 MG/DL (ref 8.5–10.5)
CHLORIDE BLD-SCNC: 102 MEQ/L (ref 98–111)
CO2: 24 MEQ/L (ref 23–33)
CREAT SERPL-MCNC: 0.7 MG/DL (ref 0.4–1.2)
ERYTHROCYTE [DISTWIDTH] IN BLOOD BY AUTOMATED COUNT: 13.2 % (ref 11.5–14.5)
ERYTHROCYTE [DISTWIDTH] IN BLOOD BY AUTOMATED COUNT: 44.5 FL (ref 35–45)
GFR SERPL CREATININE-BSD FRML MDRD: 82 ML/MIN/1.73M2
GLUCOSE BLD-MCNC: 93 MG/DL (ref 70–108)
HCT VFR BLD CALC: 24.3 % (ref 37–47)
HEMOGLOBIN: 7.9 GM/DL (ref 12–16)
MCH RBC QN AUTO: 29.7 PG (ref 26–33)
MCHC RBC AUTO-ENTMCNC: 32.5 GM/DL (ref 32.2–35.5)
MCV RBC AUTO: 91.4 FL (ref 81–99)
PLATELET # BLD: 189 THOU/MM3 (ref 130–400)
PMV BLD AUTO: 8.6 FL (ref 9.4–12.4)
POTASSIUM REFLEX MAGNESIUM: 4.1 MEQ/L (ref 3.5–5.2)
RBC # BLD: 2.66 MILL/MM3 (ref 4.2–5.4)
SODIUM BLD-SCNC: 136 MEQ/L (ref 135–145)
WBC # BLD: 4.5 THOU/MM3 (ref 4.8–10.8)

## 2022-03-09 PROCEDURE — 97530 THERAPEUTIC ACTIVITIES: CPT

## 2022-03-09 PROCEDURE — 36415 COLL VENOUS BLD VENIPUNCTURE: CPT

## 2022-03-09 PROCEDURE — 92523 SPEECH SOUND LANG COMPREHEN: CPT

## 2022-03-09 PROCEDURE — 97535 SELF CARE MNGMENT TRAINING: CPT

## 2022-03-09 PROCEDURE — 99232 SBSQ HOSP IP/OBS MODERATE 35: CPT | Performed by: NURSE PRACTITIONER

## 2022-03-09 PROCEDURE — 85027 COMPLETE CBC AUTOMATED: CPT

## 2022-03-09 PROCEDURE — 2580000003 HC RX 258: Performed by: ORTHOPAEDIC SURGERY

## 2022-03-09 PROCEDURE — 80048 BASIC METABOLIC PNL TOTAL CA: CPT

## 2022-03-09 PROCEDURE — 6370000000 HC RX 637 (ALT 250 FOR IP): Performed by: PHYSICIAN ASSISTANT

## 2022-03-09 PROCEDURE — 1200000000 HC SEMI PRIVATE

## 2022-03-09 PROCEDURE — 6370000000 HC RX 637 (ALT 250 FOR IP): Performed by: ORTHOPAEDIC SURGERY

## 2022-03-09 RX ADMIN — SODIUM CHLORIDE, PRESERVATIVE FREE 10 ML: 5 INJECTION INTRAVENOUS at 08:49

## 2022-03-09 RX ADMIN — POLYETHYLENE GLYCOL 3350 17 G: 17 POWDER, FOR SOLUTION ORAL at 08:47

## 2022-03-09 RX ADMIN — POLYETHYLENE GLYCOL 3350 17 G: 17 POWDER, FOR SOLUTION ORAL at 20:00

## 2022-03-09 RX ADMIN — HYDROCODONE BITARTRATE AND ACETAMINOPHEN 2 TABLET: 5; 325 TABLET ORAL at 05:07

## 2022-03-09 RX ADMIN — FLUCONAZOLE 200 MG: 200 TABLET ORAL at 08:48

## 2022-03-09 RX ADMIN — SENNOSIDES 17.2 MG: 8.6 TABLET ORAL at 20:00

## 2022-03-09 RX ADMIN — DOCUSATE SODIUM 100 MG: 100 CAPSULE, LIQUID FILLED ORAL at 08:47

## 2022-03-09 RX ADMIN — HYDROCODONE BITARTRATE AND ACETAMINOPHEN 2 TABLET: 5; 325 TABLET ORAL at 13:40

## 2022-03-09 RX ADMIN — NITROFURANTOIN (MONOHYDRATE/MACROCRYSTALS) 100 MG: 75; 25 CAPSULE ORAL at 20:00

## 2022-03-09 RX ADMIN — SODIUM CHLORIDE, PRESERVATIVE FREE 10 ML: 5 INJECTION INTRAVENOUS at 20:00

## 2022-03-09 RX ADMIN — RIVAROXABAN 10 MG: 10 TABLET, FILM COATED ORAL at 17:25

## 2022-03-09 RX ADMIN — NITROFURANTOIN (MONOHYDRATE/MACROCRYSTALS) 100 MG: 75; 25 CAPSULE ORAL at 08:47

## 2022-03-09 RX ADMIN — DOCUSATE SODIUM 100 MG: 100 CAPSULE, LIQUID FILLED ORAL at 20:00

## 2022-03-09 RX ADMIN — ACETAMINOPHEN 650 MG: 325 TABLET ORAL at 11:29

## 2022-03-09 RX ADMIN — SENNOSIDES 17.2 MG: 8.6 TABLET ORAL at 08:47

## 2022-03-09 RX ADMIN — ACETAMINOPHEN 650 MG: 325 TABLET ORAL at 17:25

## 2022-03-09 ASSESSMENT — PAIN DESCRIPTION - PAIN TYPE
TYPE: ACUTE PAIN

## 2022-03-09 ASSESSMENT — PAIN DESCRIPTION - PROGRESSION
CLINICAL_PROGRESSION: NOT CHANGED

## 2022-03-09 ASSESSMENT — PAIN DESCRIPTION - FREQUENCY: FREQUENCY: CONTINUOUS

## 2022-03-09 ASSESSMENT — PAIN DESCRIPTION - LOCATION
LOCATION: LEG
LOCATION: LEG

## 2022-03-09 ASSESSMENT — PAIN DESCRIPTION - ONSET: ONSET: ON-GOING

## 2022-03-09 ASSESSMENT — PAIN SCALES - GENERAL
PAINLEVEL_OUTOF10: 7
PAINLEVEL_OUTOF10: 8
PAINLEVEL_OUTOF10: 2
PAINLEVEL_OUTOF10: 5
PAINLEVEL_OUTOF10: 2
PAINLEVEL_OUTOF10: 8
PAINLEVEL_OUTOF10: 5
PAINLEVEL_OUTOF10: 4

## 2022-03-09 ASSESSMENT — PAIN DESCRIPTION - ORIENTATION
ORIENTATION: LEFT
ORIENTATION: LEFT

## 2022-03-09 ASSESSMENT — PAIN DESCRIPTION - DESCRIPTORS: DESCRIPTORS: SORE

## 2022-03-09 ASSESSMENT — PAIN DESCRIPTION - DIRECTION: RADIATING_TOWARDS: NON

## 2022-03-09 NOTE — CARE COORDINATION
3/9/22, 1:59 PM EST    DISCHARGE ON GOING EVALUATION    Margie Sanders day: 6  7P89  Barriers to Discharge:  Precert,  POD 6 ORIF left hip IMN, therapy continues, pain control. Patient Goals/Plan/Treatment Preferences: planning IP rehab; await precert.

## 2022-03-09 NOTE — PROGRESS NOTES
Hospitalist Progress Note    Patient:  David Perez      Unit/Bed:7K-24/024-A    YOB: 1950    MRN: [id number]       Acct: [de-identified]     PCP: Milan Vásquez MD    Date of Admission: 3/3/2022    Assessment/Plan:    1. Left femur comminuted fracture S/P open treatment of a subtrochanteric fracture with a CMN on 3/3/2022--per orthopedic  2. Mild acute compression fx of L4 with approximately 15% central height loss--per Ortho; treating conservatively with TLSO  3. Acute cystitis (POA) with Enterococcus facialis/ acute urinary retention--Macrobid (S) 3/8  4. Acute blood loss anemia, likely secondary to postop status--stable, monitor  5. Oliguria--resolved  6. Constipation--resolved  7. Hypotension, intermittent--stable  8. Hyponatremia--resolved  9. Postoperative pulmonary insufficiency, resolved--continue IS, stable on RA; no respiratory failure  10. 7 mm nodule at the right major fissure--needs to follow-up with PCP to consider follow-up CT in 6 to 12 months per Fleischner criteria  11. Right hilar lymphadenopathy     Expected discharge date: Pending clinical course    Disposition:    [x] Home       [] TCU       [] Rehab       [] Psych       [] SNF       [] [place]       [] Other-    Chief Complaint: Fall at work    Hospital Course: Per consult note dated 3/9: \"The patient is a 66 y.o. female with significant past medical history of osteoarthritis is admitted for left femur fracture POD #1 s/p left intertrochanteric femur fx repair with Intertan. She tripped over her feet at work [employer] and landed on her left hip and lower back.  She states that she was able to walk afterwards. Cedrick Rivera is NOT on any blood thinners.  She does not remember hitting her head. Surgery was uncomplicated. She developed sudden onset of SOB and bilateral calf pain. No cough.  No other symptoms\"    3/9--> CTA chest from 3/5 did not reveal evidence of pulmonary embolus; venous Doppler bilateral lower extremities did not reveal evidence of DVT on 3/5/2022; she is hemodynamically stable and on room air     Subjective (past 24 hours): Up in chair with  and sister at bedside, complains of some discomfort to her left hip area rates 3 out of 10, concerned of a mild wound noted to the proximal aspect of the Tegaderm dressing and I spoke with RN and orthopedic is aware; she is eating well and passing gas    Medications:  Reviewed    Infusion Medications    sodium chloride       Scheduled Medications    nitrofurantoin (macrocrystal-monohydrate)  100 mg Oral 2 times per day    fluconazole  200 mg Oral Daily    senna  2 tablet Oral BID    docusate sodium  100 mg Oral BID    polyethylene glycol  17 g Oral BID    sodium chloride flush  5-40 mL IntraVENous 2 times per day    acetaminophen  650 mg Oral Q6H    rivaroxaban  10 mg Oral Daily     PRN Meds: calcium carbonate, Calamine, diphenhydrAMINE-zinc acetate, magnesium hydroxide, bisacodyl, cyclobenzaprine, HYDROcodone-acetaminophen, sodium chloride flush, sodium chloride, ondansetron **OR** ondansetron, HYDROcodone 5 mg - acetaminophen, morphine      Intake/Output Summary (Last 24 hours) at 3/9/2022 0811  Last data filed at 3/8/2022 2037  Gross per 24 hour   Intake 840 ml   Output --   Net 840 ml       Diet:  ADULT DIET; Regular    Exam:  /68   Pulse 68   Temp 98.3 °F (36.8 °C) (Oral)   Resp 16   Ht 5' 2\" (1.575 m)   Wt 128 lb 11.2 oz (58.4 kg)   SpO2 99%   BMI 23.54 kg/m²     General appearance: No apparent distress, appears stated age and cooperative. HEENT: Pupils equal, round, and reactive to light. Conjunctivae/corneas clear. Neck: Supple, with full range of motion. No jugular venous distention. Trachea midline. Respiratory:  Normal respiratory effort. Clear to auscultation, bilaterally without Rales/Wheezes/Rhonchi. Cardiovascular: Regular rate and rhythm with normal S1/S2 without murmurs, rubs or gallops.   Abdomen: Soft, non-tender, non-distended with normal bowel sounds. Musculoskeletal: passive and active ROM x 4 extremities; family under states she ambulated well with walker, dressing noted to left hip with a small open area to the proximal aspect under the Tegaderm dressing. Skin: Skin color, texture, turgor normal.    Neurologic:  Neurovascularly intact without any focal sensory/motor deficits. Cranial nerves: II-XII intact, grossly non-focal.  Psychiatric: Alert and oriented, thought content appropriate  Capillary Refill: Brisk,< 3 seconds   Peripheral Pulses: +2 palpable, equal bilaterally     Labs:   Recent Labs     03/07/22  0613 03/07/22  1218 03/08/22  0442 03/08/22  1422 03/09/22  0552   WBC 6.0  --  5.0  --  4.5*   HGB 8.5*   < > 7.9* 8.9* 7.9*   HCT 26.5*   < > 24.3* 27.2* 24.3*     --  170  --  189    < > = values in this interval not displayed. Recent Labs     03/07/22  0613 03/08/22  0442 03/09/22  0552    136 136   K 4.4 4.0 4.1    103 102   CO2 25 23 24   BUN 10 11 12   CREATININE 0.5 0.6 0.7   CALCIUM 8.1* 8.4* 8.6     Microbiology:    Urine with Enterococcus    Urinalysis:      Lab Results   Component Value Date    NITRU NEGATIVE 03/06/2022    WBCUA  03/06/2022    BACTERIA MANY 03/06/2022    RBCUA 15-20 03/06/2022    BLOODU LARGE 03/06/2022    SPECGRAV 1.012 03/06/2022    GLUCOSEU NEGATIVE 08/04/2018       Radiology:  XR LUMBAR SPINE (2-3 VIEWS)    Result Date: 3/4/2022  PROCEDURE: XR LUMBAR SPINE (2-3 VIEWS) CLINICAL INFORMATION: low back pain, fall 3/3, COMPARISON: MRI lumbar spine dated 9/26/2018. TECHNIQUE: AP and lateral views of the lumbar spine. FINDINGS: There is a minimal dextrocurvature of the lumbar spine, stable compared to prior MRI. There is grade 1 anterolisthesis of L4 relative to L5 on the basis of degenerative change, stable compared to prior exam. There is facet hypertrophy at the lower lumbar  levels, similar to prior MRI. There is diffuse demineralization. There is gaseous distention of large bowel. Stable degenerative changes of the lumbar spine. **This report has been created using voice recognition software. It may contain minor errors which are inherent in voice recognition technology. ** Final report electronically signed by Dr. Conor Worthington MD on 3/4/2022 3:16 PM    XR HIP LEFT (2-3 VIEWS)    Result Date: 3/3/2022  PROCEDURE: XR HIP LEFT (2-3 VIEWS) CLINICAL INFORMATION: Fall TECHNIQUE: 2 views of the left hip COMPARISON: None FINDINGS: There is a minimally displaced comminuted intertrochanteric fracture of the proximal femur. No dislocation is identified. There is soft tissue swelling adjacent to the fracture site. Comminuted fracture of the proximal femur. Final report electronically signed by Dr. Tj Watkins on 3/3/2022 4:15 PM    XR FEMUR LEFT (MIN 2 VIEWS)    Result Date: 3/5/2022  PROCEDURE: XR FEMUR LEFT (MIN 2 VIEWS) CLINICAL INFORMATION: left femur orif intertan . COMPARISON: Left hip series dated 3/3/2022. TECHNIQUE: Fluoroscopic support was provided for ORIF of left femur fracture. There are 3 saved fluoroscopic images. Fluoroscopy time = 62.7 seconds. FINDINGS/IMPRESSION: Saved fluoroscopic images show intramedullary nail within the femur with cross threaded screws traversing the left femoral neck without evidence of hardware failure. There is anatomic alignment. **This report has been created using voice recognition software. It may contain minor errors which are inherent in voice recognition technology. ** Final report electronically signed by Dr. Conor Worthington MD on 3/5/2022 8:11 AM    CT HEAD WO CONTRAST    Result Date: 3/3/2022  PROCEDURE: CT HEAD WO CONTRAST CLINICAL INFORMATION: Fall TECHNIQUE: CT scan of the head was performed from the vertex to the skull base without use of intravenous contrast. Axial images as well as coronal and sagittal reconstructions were obtained.  All CT scans at this facility use dose modulation, iterative reconstruction, and/or weight-based dosing when appropriate to reduce radiation dose to as low as reasonably achievable. COMPARISON: CT head 8/4/2018 FINDINGS: There is no mass effect, midline shift or acute hemorrhage. Ventricles and CSF spaces are within normal limits. Gray-white matter differentiation is preserved. Visualized orbits, paranasal sinuses and mastoid air cells are unremarkable. No mass effect or acute hemorrhage. **This report has been created using voice recognition software. It may contain minor errors which are inherent in voice recognition technology. ** Final report electronically signed by Dr. Mila Ruggiero on 3/3/2022 4:37 PM    CT CERVICAL SPINE WO CONTRAST    Result Date: 3/3/2022  PROCEDURE: CT CERVICAL SPINE WO CONTRAST CLINICAL INFORMATION: Fall TECHNIQUE: CT of the cervical spine was performed without use of intravenous contrast. Axial images as well as coronal and sagittal reconstructions were obtained. All CT scans at this facility use dose modulation, iterative reconstruction, and/or weight-based dosing when appropriate to reduce radiation dose to as low as reasonably achievable. COMPARISON: None FINDINGS: There is straightening of normal cervical lordosis. Disc space narrowing and osteophyte formation are present at the C4-C5, C5-C6 and C6-C7 levels. Cervical vertebral body heights are preserved. There is no fracture or spondylolisthesis. The atlantodental interval is normal. Neural foraminal narrowing is present in the bilateral C3-C4, C4-C5, C5-C6 and C6-C7 levels. There is mild central canal stenosis at these levels. Atherosclerotic calcifications are present in the bilateral extracranial carotid arteries. There is minimal scarring at the bilateral lung apices. 1. No fracture or spondylolisthesis of the cervical spine. 2. Multilevel degenerative disc disease, neural foraminal narrowing and central canal stenosis as detailed above.  Final report electronically signed by Dr. Dean Rebolledo on 3/3/2022 4:41 PM    XR CHEST PORTABLE    Result Date: 3/3/2022  PROCEDURE: XR CHEST PORTABLE CLINICAL INFORMATION: Fall TECHNIQUE: Mobile AP chest radiograph. COMPARISON: PA and lateral chest radiographs 4/18/2019 FINDINGS: The costophrenic angles and the upper abdomen are excluded from the submitted image. Cardiac silhouette is within normal limits. Atherosclerotic allegations are present in the thoracic aorta. There are no lung consolidations. Degenerative changes in the thoracic spine are poorly visualized. Soft tissues are unremarkable. No acute intrathoracic process. **This report has been created using voice recognition software. It may contain minor errors which are inherent in voice recognition technology. ** Final report electronically signed by Dr. Dean Rebolledo on 3/3/2022 4:14 PM    FLUORO FOR SURGICAL PROCEDURES    Result Date: 3/3/2022  Radiology exam is complete. No Radiologist dictation. Please follow up with ordering provider. DVT prophylaxis: [] Lovenox                                 [] SCDs                                 [] SQ Heparin                                 [] Encourage ambulation           [x] Already on Anticoagulation~Xarelto 10 mg daily     Code Status: Full Code    PT/OT Eval Status:  On case    Tele:   [] yes             [x] no    Active Hospital Problems    Diagnosis Date Noted    Intertrochanteric fracture of left femur, closed, initial encounter [facility]) Sammy Henry 03/03/2022       Electronically signed by FLORIN Wright CNP on 3/9/2022 at 8:11 AM

## 2022-03-09 NOTE — PROGRESS NOTES
Patricia [address] 59  PHYSICAL THERAPY MISSED TREATMENT NOTE  [facility] ORTHOPEDICS 7K    Date: 3/9/2022  Patient Name: Michele Rg        MRN: [id number]   : 1950  (66 y.o.)  Gender: female   Referring Practitioner: Carolyn Gabriel MD  Diagnosis: Closed fracture of left hip         REASON FOR MISSED TREATMENT:  First attempt CNP in room and second attempt ST in room, will check back later if able .

## 2022-03-09 NOTE — PROGRESS NOTES
[facility]  Occupational Therapy  Daily Note  Time:   Time In: 1519  Time Out: 1147  Timed Code Treatment Minutes: 42 Minutes  Minutes: 42    Date: 3/9/2022  Patient Name: Rafa Zhao,   Gender: female      Room: [facility]24/024-A  MRN: [id number]  : 1950  (66 y.o.)  Referring Practitioner: Rei Musa MD  Diagnosis: Closed Fracture of left Hip  Additional Pertinent Hx: Rafa Zhao is an 66 y.o.  female who was at [facility] today and was reaching up for a toilet seat when she fell over and landed on her left side. She immediately had back and left hip pain and presented to the ED as she was unable to bear weight on her left leg. She denies distal paresthesias. Xray confirmed a left minimally displaced comminuted intertrochanteric fracture of the proximal femur. Pt is s/p L hip ORIF 3/3 by Dr Seamus Bernal. Restrictions/Precautions:  Restrictions/Precautions: Weight Bearing,Fall Risk  Left Lower Extremity Weight Bearing: Weight Bearing As Tolerated  Required Braces or Orthoses  Spinal: Lumbar Corset  Position Activity Restriction  Spinal Precautions: No Bending,No Lifting,No Twisting     SUBJECTIVE: RN approved OT session. Pt sitting up in recliner upon arrival with multiple family members and friends present. PAIN: 6/10: L hip at rest, 7/10 following activity    Vitals: RN in for vitals check during session    COGNITION: WFL, some moments of slow processing with difficulty word finding    ADL:   Toileting: Contact Guard Assistance. for pericares  Toilet Transfer: Minimal Assistance and with increased time for completion. to/from [facility]. BALANCE:  Sitting Balance:  Supervision. in recliner and on BS - pt frequently off-loading weight onto R hip due to increased pain and discomfort while sitting. Once back in recliner, provided waffle cushion with increased time taken for positioning with pt noting improved comfort. Standing Balance: Stand By Assistance.  with 1-2UE support on RW    BED MOBILITY:  Not Tested    TRANSFERS:  Sit to Stand:  Minimal Assistance, cues for hand placement, to/from chair with arms. Stand to Sit: [employer], Minimal Assistance, to/from chair with arms. CGA from recliner, with min A required from [facility] due to lower height    FUNCTIONAL MOBILITY:  Assistive Device: Rolling Walker  Assist Level:  with verbal cues  and with increased time for completion. Distance: from recliner to doorway of room, and back to recliner  Pt with decreased step through of RLE, noted to be dragging R foot and not clearing ground. Pt requires education and verbal cues to offload through BUEs into RW with demo following mobility. Pt expressing understanding and with improved RLE step through from [facility] to recliner. ADDITIONAL ACTIVITIES:  Patient completed BUE strengthening exercises with skilled education on HEP: completed x10 reps x1 set with a moderate resistance theraband in all joints and all planes in order to improve UE strength and activity tolerance required for BADL routine and toilet / shower transfers. Patient tolerated well, requiring brief rest breaks. Patient also required moderate verbal and visual cues for technique. Additional time spent reviewing BLT precautions and purpose of lumbar corset, as well importance of proper hand placement during transfers per patient request. Patient expressing understanding and with no further questions following education. ASSESSMENT:     Activity Tolerance:  Patient tolerance of  treatment: good. Discharge Recommendations: Inpatient Rehabilitation  Equipment Recommendations: Equipment Needed: Yes  Other: RW. monitor need for [facility].   Plan: Times per week: 6x  Current Treatment Recommendations: Strengthening,Balance Training,Functional Mobility Training,Endurance Training,Self-Care / ADL,Safety Education & Training,Patient/Caregiver Education & Training,Equipment Evaluation, Education, & procurement,Home Management Training    Patient Education  Patient Education: Role of OT, Plan of Care, Precautions and Importance of Increasing Activity    Goals  Short term goals  Time Frame for Short term goals: Until discharge  Short term goal 1: Pt will complete BUE light resistive exercises with min vcs for technique to increase indep and safety with all self cares. Short term goal 2: Pt will complete standing tolerance x 4 minutes with min A +1 and min vcs for safety to increase indep with sinkside grooming. Short term goal 3: Pt will complete LB dressing with LHAE PRN and Mod A to increase indep within home environment. Short term goal 4: Pt will complete sit to stands transfers with min A +1 and min vcs for safety to increase indep with toileting. Short term goal 5: Pt will complete functional mobility short distances with SBA and min vcs for safety to increase indep with toileting. Following session, patient left in safe position with all fall risk precautions in place.

## 2022-03-09 NOTE — PROGRESS NOTES
[address]ricia [address] 59  PHYSICAL THERAPY MISSED TREATMENT NOTE  [facility] ORTHOPEDICS 7K    Date: 3/9/2022  Patient Name: Pastor Ramos        MRN: [id number]   : 1950  (66 y.o.)  Gender: female   Referring Practitioner: Helga Deras MD  Diagnosis: Closed fracture of left hip         REASON FOR MISSED TREATMENT:  Missed Treat. Pt working with OT at time of attempt. PT to check back later as able and time allows.      Marvin Flores PT, DPT

## 2022-03-09 NOTE — PROGRESS NOTES
Checked precert using [employer] website using the patient insurance ID states that the case is PENDED.

## 2022-03-09 NOTE — PROGRESS NOTES
[facility]  Acute Inpatient Rehab Preadmission Assessment    Patient Name: Michele Rg        MRN: [id number]    : 1950  (66 y.o.)  Gender: female     Admitted from:[address]  Initial Assessment    Date of admission to the hospital: 3/3/2022  3:26 PM  Date patient eligible for admission: 3/10/2022    Primary Diagnosis: Left femur fracture      Did patient have surgery? yes - Open treatment of a subtrochanteric fracture with a cephamedullary nail      Physicians: Ryley Robertson MD, Dr. Chon Bearden, Dr. Yuri Lance for clinical complications/co-morbidities:   Past Medical History:   Diagnosis Date    Closed fracture of right shoulder      Financial Information  Primary insurance:  [employer] Medicare    Secondary Insurance:   None    Has the patient had two or more falls in the past year or any fall with injury in the past year? yes    Did the patient have major surgery during the 100 days prior to admission?   yes    Precautions:     Restrictions/Precautions: Weight Bearing,Fall Risk  Left Lower Extremity Weight Bearing: Weight Bearing As Tolerated  Required Braces or Orthoses  Spinal: Lumbar Corset      Isolation Precautions: None       Physiatrist: Dr. Chon Bearden    Patients Occupation: Retired    Reviewed Lab and Diagnostic reports from Current Admission: Yes    Patients Prior Functional  Level: Prior Function  ADL Assistance: Independent  Homemaking Assistance: Independent  Ambulation Assistance: Independent  Transfer Assistance: Independent  Additional Comments: Pt amb without AD    Current functional status for upper extremity ADLs: Minimal assistance    Current functional status for lower extremity ADLs: Moderate assistance    Current functional status for bed, chair, wheelchair transfers: Minimal assistance    Current functional status for toilet transfers: Minimal assistance    Current functional status for locomotion: Contact guard assistance     Current functional status for bladder management: Minimal contact assistance    Current functional status for bowel management:Moderate assistance    Current functional status for comprehension: Moderate assistance    Current functional status for expression: Moderate assistance    Current functional status for social interaction: Moderate assistance    Current functional status for problem solving: Moderate assistance    Current functional status for memory: Moderate assistance    Expected level of Improvement in Self-Care:  Modified independence    Expected level of Improvement in Sphincter Control:  Modified independence    Expected level of Improvement in Transfers: Modified independence    Expected level of Improvement in Locomotion:  Modified independence    Expected level of Improvement in Communication and Social Cognition: Modified independence    Expected length of time to achieve that level of improvement: 2 weeks    Current rehab issues: ADL dysfunction,bladder management, bowel management,carry over of therapy techniques, discharge planning, disease and co-morbidity management, gait/mobility dysfunction, medication management, nutrition and hydration management, Ongoing assessment of safety, Pain management, Patient and family education, Prevention of secondary complications, Skin Integrity, cognitive impairment, communication impairment. Required therapy: Physical Therapy, Occupational Therapy and Speech Therapy 3 hours per day, 5-6 days per week. Recreational Therapy 1 hour per week.     Expected Discharge Destination: Home    Expected Post Discharge Treatments: Home Care    Other information relevant to the care needs:   Lives With: Spouse  Type of Home: House  Home Layout: One level  Home Access: Stairs to enter without rails  Entrance Stairs - Number of Steps: 1  Bathroom Shower/Tub: Walk-in shower (3in threshold into shower)  Bathroom Toilet: Handicap height  Bathroom Equipment: Grab bars in shower,Shower chair  Bathroom Accessibility: Accessible  Home Equipment: Rolling walker  ADL Assistance: Independent  Homemaking Assistance: Independent  Homemaking Responsibilities: Yes  Ambulation Assistance: Independent  Transfer Assistance: Independent  Active : Yes  Additional Comments: Pt amb without AD    Acute Inpatient Rehabilitation Disclosure Statement provided to patient. Patient verbalized understanding. I have reviewed and concur with the findings and results of the pre-admission screening assessment completed by the Inpatient Rehabilitation Admissions Coordinator.     Ml Fonseca MD

## 2022-03-09 NOTE — PLAN OF CARE
Problem: Falls - Risk of:  Goal: Will remain free from falls  Description: Will remain free from falls  3/9/2022 0058 by Dunia Herndon RN  Outcome: Ongoing  3/8/2022 1454 by Hortencia Warner RN  Outcome: Ongoing  Note: Keeping bed in lowest position, call light and over bed table within patient reach, call lights answered in a timely manner, and non skid footwear being worn when out of bed. Goal: Absence of physical injury  Description: Absence of physical injury  Outcome: Ongoing     Problem: Skin Integrity:  Goal: Will show no infection signs and symptoms  Description: Will show no infection signs and symptoms  3/9/2022 0058 by Dunia Herndon RN  Outcome: Ongoing  3/8/2022 1454 by Hortencia Warner RN  Outcome: Ongoing  Note: This patient had impaired skin integrity from surgery to repair fractured femur. Goal: Absence of new skin breakdown  Description: Absence of new skin breakdown  Outcome: Ongoing     Problem: Pain:  Goal: Pain level will decrease  Description: Pain level will decrease  3/8/2022 1454 by Hortencia Warner RN  Outcome: Ongoing  Note: Patient is using oxycodone approximately every 4 hours. Problem: Activity:  Goal: Ability to ambulate will improve  Description: Ability to ambulate will improve  3/8/2022 1454 by Hortencia Warner RN  Outcome: Ongoing  Note: Patient is ambulating with assistance. Problem: Urinary Elimination:  Goal: Ability to reestablish a normal urinary elimination pattern will improve - after catheter removal  Description: Ability to reestablish a normal urinary elimination pattern will improve  3/8/2022 1454 by Hortencia Warner RN  Outcome: Ongoing  Note: Patient is voiding adequate amounts of clear yellow urine. Problem: Cardiovascular  Goal: No DVT, peripheral vascular complications  0/2/0154 [id number] by Hortencia Warner RN  Outcome: Ongoing  Note: Patient is receiving medication for prevention of DVT.      Problem: GI  Goal: No bowel complications  5/7/6325 1454 by Dao Hedrick RN  Outcome: Ongoing  Note: Patient is using stool softeners to keep from having to strain for [facility]     Problem: Discharge Planning:  Goal: Discharged to appropriate level of care  Description: Discharged to appropriate level of care  3/8/2022 1454 by Dao Hedrick RN  Outcome: Ongoing  Note: Patient plans to be discharged to inpatient physical therapy.

## 2022-03-09 NOTE — PROGRESS NOTES
Orthopedic Surgery      Orthopaedic Attending Note  POD 6 ORIF left hip IMN  No new ortho concerns. Continues working with therapy. Pain controlled with medications. hgb 7.9,  Hemodynamically stable. precert for rehab started yesterday.    Electronically signed by FLORIN Kennedy CNP on 3/9/2022 at 9:17 AM

## 2022-03-09 NOTE — PROGRESS NOTES
[facility] ORTHOPEDICS 7K  Speech - Language - Cognitive Evaluation    SLP Individual Minutes  Time In: 020  Time Out: 2167  Minutes: 29  Timed Code Treatment Minutes: 0 Minutes       Date: 3/9/2022  Patient Name: Jessica Mai      CSN: [id number]   : 1950  (66 y.o.)  Gender: female   Referring Physician:  Alexus James MD  Diagnosis: Closed Fracture of left hip  Secondary Diagnosis: cognitive deficits  Precautions: fall risk  History of Present Illness/Injury: Per Medical Record:  Jessica Mai is an 66 y.o.  female who was at [facility] today and was reaching up for a toilet seat when she fell over and landed on her left side. She immediately had back and left hip pain and presented to the ED as she was unable to bear weight on her left leg. She denies distal paresthesias. Xray confirmed a left minimally displaced comminuted intertrochanteric fracture of the proximal femur. She does not take any home medications. Past Medical History:   Diagnosis Date    Closed fracture of right shoulder 2020       Pain: No pain reported. Subjective:  Patient was upright in a chair upon ST arrival.  Patient reported she was waiting on pre-certification to determine if she would be going to inpatient rehab. Her sister and  were present for parts of this assessment. SOCIAL HISTORY:   Living Arrangements: Patient lives at home with her spouse. Work History: Retired  Education Level: Patient completed balaji year of high school. Later she completed her GED. Driving Status: Active   Finance Management: Independent  Medication Management: Independent  ADL's: Independent. Patient responsible for all home care and cooking. Hobbies: reading, watching television  Vision Status: wears glasses  Hearing: No reported concerns.    Type of Home: House  Home Layout: One level  Home Access: Stairs to enter without rails  Entrance Stairs - Number of Steps: 1  Home Equipment: Rolling walker    SPEECH / VOICE:  Speech and Voice appear to be grossly intact for basic and complex daily communication    LANGUAGE:  Receptive:  Receptive language skills appear to be grossly intact for basic and complex daily communication. Expressive:  Expressive language skills appear to be grossly intact for basic and complex daily communication. COGNITION:  [facility] Cognitive Assessment [facility]) version 7.1 completed. Pt scored 25/30. Normal is greater than or equal to 26/30. Inclusion of +1 point given highest level of education achieved less than/equal to 12th grade or GED with limited-0 post-secondary schooling       Orientation:   Immediate Recall: T1: 35, T2:   Short-Term Recall: 3/5 independently, / with category cue, / with [facility] cue  Divergent Namin/11  Attention: 1 error in letter sequence  Math Computation: serial 7s 4x  Executive Functioning: Trail Making 0/1, cube 1/1, clock 3/3   Sentence recall: 0/2    SWALLOWING:  Current Diet: Regular, thin  Not Tested         RECOMMENDATIONS/ASSESSMENT:  DIAGNOSTIC IMPRESSIONS:  Patient presents with mild cognitive deficits in the areas of immediate and delayed recall, visuospatial/executive functioning skills, and language fluency/divergent naming. Patient was previously independent with all ADLs and is responsible for all home care tasks, managing finances, medications and driving. Patient reports recent difficulties with memory in her home environment and reported difficulty remembering where things are at. The patient would benefit from additional speech therapy services to address the above deficits and ensure that the patient can return to completing all IADLs independently upon discharge.    Rehabilitation Potential: good    EDUCATION:  Learner: Patient and Family  Education:  Reviewed results and recommendations of this evaluation, Reviewed ST goals and Plan of Care and Reviewed recommendations for follow-up  Evaluation of Education: Verbalizes understanding and Needs further instruction    PLAN:  Skilled SLP intervention on acute care 3-5 x per week or until goals met and/or pt plateaus in function. Specific interventions for next session may include: functional memory and problem solving tasks. PATIENT GOAL:    Return to prior level of function. SHORT TERM GOALS:  Short-term Goals  Timeframe for Short-term Goals: 2 weels  Goal 1: Patient to complete immediate/delayed recall and working memory tasks to 70% accuracy given minimal cues to improve retention of functional information. Goal 2: Patient to complete reasoning and thought organizational tasks with 70% accuracy given minimal cues to improve skills required for IADL completion. Goal 3: Patient to complete problem solving and sequencing tasks to 70% accuracy given minimal cues to improve skills required for IADL completion. Lauren Castillo M.A.  CCC-SLP

## 2022-03-10 LAB
ANION GAP SERPL CALCULATED.3IONS-SCNC: 11 MEQ/L (ref 8–16)
BUN BLDV-MCNC: 10 MG/DL (ref 7–22)
CALCIUM SERPL-MCNC: 8.7 MG/DL (ref 8.5–10.5)
CHLORIDE BLD-SCNC: 103 MEQ/L (ref 98–111)
CO2: 24 MEQ/L (ref 23–33)
CREAT SERPL-MCNC: 0.6 MG/DL (ref 0.4–1.2)
ERYTHROCYTE [DISTWIDTH] IN BLOOD BY AUTOMATED COUNT: 13.2 % (ref 11.5–14.5)
ERYTHROCYTE [DISTWIDTH] IN BLOOD BY AUTOMATED COUNT: 44.2 FL (ref 35–45)
GFR SERPL CREATININE-BSD FRML MDRD: > 90 ML/MIN/1.73M2
GLUCOSE BLD-MCNC: 97 MG/DL (ref 70–108)
HCT VFR BLD CALC: 26.7 % (ref 37–47)
HEMOGLOBIN: 8.4 GM/DL (ref 12–16)
MCH RBC QN AUTO: 29 PG (ref 26–33)
MCHC RBC AUTO-ENTMCNC: 31.5 GM/DL (ref 32.2–35.5)
MCV RBC AUTO: 92.1 FL (ref 81–99)
PLATELET # BLD: 254 THOU/MM3 (ref 130–400)
PMV BLD AUTO: 8.4 FL (ref 9.4–12.4)
POTASSIUM REFLEX MAGNESIUM: 4.3 MEQ/L (ref 3.5–5.2)
RBC # BLD: 2.9 MILL/MM3 (ref 4.2–5.4)
SODIUM BLD-SCNC: 138 MEQ/L (ref 135–145)
WBC # BLD: 4.6 THOU/MM3 (ref 4.8–10.8)

## 2022-03-10 PROCEDURE — 6370000000 HC RX 637 (ALT 250 FOR IP): Performed by: ORTHOPAEDIC SURGERY

## 2022-03-10 PROCEDURE — 1200000000 HC SEMI PRIVATE

## 2022-03-10 PROCEDURE — 85027 COMPLETE CBC AUTOMATED: CPT

## 2022-03-10 PROCEDURE — 97110 THERAPEUTIC EXERCISES: CPT

## 2022-03-10 PROCEDURE — 99232 SBSQ HOSP IP/OBS MODERATE 35: CPT | Performed by: NURSE PRACTITIONER

## 2022-03-10 PROCEDURE — 2580000003 HC RX 258: Performed by: ORTHOPAEDIC SURGERY

## 2022-03-10 PROCEDURE — 97130 THER IVNTJ EA ADDL 15 MIN: CPT

## 2022-03-10 PROCEDURE — 97129 THER IVNTJ 1ST 15 MIN: CPT

## 2022-03-10 PROCEDURE — 6370000000 HC RX 637 (ALT 250 FOR IP): Performed by: PHYSICIAN ASSISTANT

## 2022-03-10 PROCEDURE — 36415 COLL VENOUS BLD VENIPUNCTURE: CPT

## 2022-03-10 PROCEDURE — 80048 BASIC METABOLIC PNL TOTAL CA: CPT

## 2022-03-10 PROCEDURE — 97116 GAIT TRAINING THERAPY: CPT

## 2022-03-10 PROCEDURE — 6360000002 HC RX W HCPCS: Performed by: ORTHOPAEDIC SURGERY

## 2022-03-10 PROCEDURE — 97530 THERAPEUTIC ACTIVITIES: CPT

## 2022-03-10 RX ORDER — NITROFURANTOIN 25; 75 MG/1; MG/1
100 CAPSULE ORAL EVERY 12 HOURS SCHEDULED
Status: CANCELLED | OUTPATIENT
Start: 2022-03-10 | End: 2022-03-13

## 2022-03-10 RX ADMIN — MORPHINE SULFATE 2 MG: 2 INJECTION, SOLUTION INTRAMUSCULAR; INTRAVENOUS at 06:29

## 2022-03-10 RX ADMIN — DOCUSATE SODIUM 100 MG: 100 CAPSULE, LIQUID FILLED ORAL at 21:59

## 2022-03-10 RX ADMIN — ACETAMINOPHEN 650 MG: 325 TABLET ORAL at 06:08

## 2022-03-10 RX ADMIN — FLUCONAZOLE 200 MG: 200 TABLET ORAL at 08:57

## 2022-03-10 RX ADMIN — ACETAMINOPHEN 650 MG: 325 TABLET ORAL at 00:27

## 2022-03-10 RX ADMIN — SODIUM CHLORIDE, PRESERVATIVE FREE 10 ML: 5 INJECTION INTRAVENOUS at 22:00

## 2022-03-10 RX ADMIN — SENNOSIDES 17.2 MG: 8.6 TABLET ORAL at 21:59

## 2022-03-10 RX ADMIN — HYDROCODONE BITARTRATE AND ACETAMINOPHEN 1 TABLET: 5; 325 TABLET ORAL at 09:09

## 2022-03-10 RX ADMIN — POLYETHYLENE GLYCOL 3350 17 G: 17 POWDER, FOR SOLUTION ORAL at 09:09

## 2022-03-10 RX ADMIN — RIVAROXABAN 10 MG: 10 TABLET, FILM COATED ORAL at 18:31

## 2022-03-10 RX ADMIN — ACETAMINOPHEN 650 MG: 325 TABLET ORAL at 18:31

## 2022-03-10 RX ADMIN — HYDROCODONE BITARTRATE AND ACETAMINOPHEN 2 TABLET: 5; 325 TABLET ORAL at 12:54

## 2022-03-10 RX ADMIN — DOCUSATE SODIUM 100 MG: 100 CAPSULE, LIQUID FILLED ORAL at 09:09

## 2022-03-10 RX ADMIN — NITROFURANTOIN (MONOHYDRATE/MACROCRYSTALS) 100 MG: 75; 25 CAPSULE ORAL at 21:59

## 2022-03-10 RX ADMIN — CYCLOBENZAPRINE 10 MG: 10 TABLET, FILM COATED ORAL at 06:37

## 2022-03-10 RX ADMIN — SENNOSIDES 17.2 MG: 8.6 TABLET ORAL at 08:57

## 2022-03-10 RX ADMIN — NITROFURANTOIN (MONOHYDRATE/MACROCRYSTALS) 100 MG: 75; 25 CAPSULE ORAL at 08:57

## 2022-03-10 ASSESSMENT — PAIN SCALES - GENERAL
PAINLEVEL_OUTOF10: 9
PAINLEVEL_OUTOF10: 5
PAINLEVEL_OUTOF10: 0
PAINLEVEL_OUTOF10: 8
PAINLEVEL_OUTOF10: 8
PAINLEVEL_OUTOF10: 4
PAINLEVEL_OUTOF10: 6
PAINLEVEL_OUTOF10: 5
PAINLEVEL_OUTOF10: 5
PAINLEVEL_OUTOF10: 0

## 2022-03-10 ASSESSMENT — PAIN DESCRIPTION - PROGRESSION
CLINICAL_PROGRESSION: NOT CHANGED

## 2022-03-10 ASSESSMENT — PAIN DESCRIPTION - FREQUENCY: FREQUENCY: INTERMITTENT

## 2022-03-10 ASSESSMENT — PAIN DESCRIPTION - ORIENTATION: ORIENTATION: LEFT

## 2022-03-10 ASSESSMENT — PAIN DESCRIPTION - ONSET: ONSET: ON-GOING

## 2022-03-10 ASSESSMENT — PAIN DESCRIPTION - LOCATION: LOCATION: LEG

## 2022-03-10 ASSESSMENT — PAIN DESCRIPTION - PAIN TYPE
TYPE: ACUTE PAIN
TYPE: ACUTE PAIN

## 2022-03-10 NOTE — PROGRESS NOTES
[facility]  INPATIENT PHYSICAL THERAPY  DAILY NOTE  [facility] ORTHOPEDICS 7K - 2K-52/704-U    Time In: 6313  Time Out: 1044  Timed Code Treatment Minutes: 24 Minutes  Minutes: 24          Date: 3/10/2022  Patient Name: Liseth Ennis,  Gender:  female        MRN: [id number]  : 1950  (66 y.o.)     Referring Practitioner: Ricarda Rebolledo MD  Diagnosis: Closed fracture of left hip  Additional Pertinent Hx: Liseth Ennis is an 66 y.o.  female who was at [facility] today and was reaching up for a toilet seat when she fell over and landed on her left side. She immediately had back and left hip pain and presented to the ED as she was unable to bear weight on her left leg. She denies distal paresthesias. Xray confirmed a left minimally displaced comminuted intertrochanteric fracture of the proximal femur. Pt is s/p L hip ORIF 3/3 by Dr Shilpa Solitario. Prior Level of Function:  Lives With: Spouse  Type of Home: House  Home Layout: One level  Home Access: Stairs to enter without rails  Entrance Stairs - Number of Steps: 1  Home Equipment: Rolling walker   Bathroom Shower/Tub: Walk-in shower (3in threshold into shower)  Bathroom Toilet: Handicap height  Bathroom Equipment: Grab bars in shower,Shower chair  Bathroom Accessibility: Accessible    ADL Assistance: [address] [place] [address] Avenue: Independent  Homemaking Responsibilities: Yes  Ambulation Assistance: Independent  Transfer Assistance: Independent  Active : Yes  Additional Comments: Pt amb without AD    Restrictions/Precautions:  Restrictions/Precautions: Weight Bearing,Fall Risk  Left Lower Extremity Weight Bearing: Weight Bearing As Tolerated  Required Braces or Orthoses  Spinal: Lumbar Corset  Position Activity Restriction  Spinal Precautions: No Bending,No Lifting,No Twisting     SUBJECTIVE: RN approved session. Pt resting in bedside chair visiting with her .  Pleasant and agreeable to therapy, pt noting she has been having increased pain today    PAIN: 8/10: L LE/hip area    Vitals: Vitals not assessed per clinical judgement, see nursing flowsheet    OBJECTIVE:  Bed Mobility:  Not Tested    Transfers:  Sit to Stand: Minimal Assistance, From bedside chair, cuing for hand placement. Stand to Sit:Contact Guard Assistance    Ambulation:  Contact Guard Assistance  Distance: 12 feet x1   Surface: Level Tile  Device:Rolling Walker  Gait Deviations: Forward Flexed Posture, Slow Antonia, Decreased Step Length Bilaterally, Decreased Gait Speed and Decreased Heel Strike Bilaterally    Balance:  Dynamic Sitting Balance: Stand By Assistance  Static Standing Balance: Contact Guard Assistance    Exercise:  Patient was guided in 1 set(s) 10 reps of exercise to both lower extremities. Ankle pumps, Glut sets, Quad sets, Heelslides and Long arc quads. Exercises were completed for increased independence with functional mobility. Functional Outcome Measures: Completed  AM-PAC Inpatient Mobility Raw Score : 15  AM-PAC Inpatient T-Scale Score : 39.45    ASSESSMENT:  Assessment: Patient progressing toward established goals. and PT tolerated fairly well. Limited by pain, decreased strength, decreased mobility. Would benefit from continued therapy before returning home. Activity Tolerance:  Patient tolerance of  treatment: good. Equipment Recommendations: Other: will monitor needs pending progress  Discharge Recommendations: Continue to assess pending progress and Inpatient Rehabilitation  Plan: Times per week: 6x O  Current Treatment Recommendations: Strengthening,Home Exercise Program,Safety Education & DARREN Montemayor Education & Training,Functional Mobility Training,Balance Training,Transfer Training    Patient Education  Patient Education: Plan of Care, Transfers, Gait    Goals:  Patient goals : to get better  Short term goals  Time Frame for Short term goals: by discharge  Short term goal 1: Pt to transfer supine <--> sit min

## 2022-03-10 NOTE — PROGRESS NOTES
Planning admission to acute inpatient rehab today pending precert approval.  Will be admitted to 17 upon precert approval.

## 2022-03-10 NOTE — PLAN OF CARE
Problem: Activity:    Goal: Ability to ambulate will improve    Description: Ability to ambulate will improve    Outcome: Ongoing     Pt uses back brace and walker when out of bed. Pt is able to move self while in bed and has performed adequate range of motion of all extremities.

## 2022-03-10 NOTE — PROGRESS NOTES
Hospitalist Progress Note    Patient:  Zeferino Amador      Unit/Bed:7K-24/024-A    YOB: 1950    MRN: [id number]       Acct: [de-identified]     PCP: Liliya May MD    Date of Admission: 3/3/2022    Assessment/Plan:    1. Left femur comminuted fracture S/P open treatment of a subtrochanteric fracture with a CMN on 3/3/2022--per orthopedic  2. Mild acute compression fx of L4 with approximately 15% central height loss--per Ortho; treating conservatively with TLSO  3. Acute cystitis (POA) with Enterococcus facialis/ acute urinary retention--Macrobid (S) 3/8 x 5 days total  4. Acute blood loss anemia, likely secondary to postop status--stable, hemoglobin at 8.4 today, monitor  5. Oliguria--resolved  6. Constipation--resolved  7. Hypotension, intermittent--stable  8. Hyponatremia--resolved  9. Postoperative pulmonary insufficiency, resolved--continue IS, stable on RA; no respiratory failure  10. 7 mm nodule at the right major fissure--needs to follow-up with PCP to consider follow-up CT in 6 to 12 months per Fleischner criteria  11. Right hilar lymphadenopathy--please see #10     Expected discharge date: Pending clinical course; medically stable and okay for rehab, hospitalist will sign off, Davey Perry 89 addressed in discharge/readmission for rehab    Disposition:    [x] Home       [] TCU       [] Rehab       [] Psych       [] SNF       [] [place]       [] Other-    Chief Complaint: Fall at work    Hospital Course: Per consult note dated 3/9: \"The patient is a 66 y.o. female with significant past medical history of osteoarthritis is admitted for left femur fracture POD #1 s/p left intertrochanteric femur fx repair with Intertan. She tripped over her feet at work [employer] and landed on her left hip and lower back.  She states that she was able to walk afterwards. Molly Langford is NOT on any blood thinners.  She does not remember hitting her head. Surgery was uncomplicated.  She developed sudden onset of SOB and bilateral calf pain. No cough. No other symptoms\"    3/9--> CTA chest from 3/5 did not reveal evidence of pulmonary embolus; venous Doppler bilateral lower extremities did not reveal evidence of DVT on 3/5/2022; she is hemodynamically stable and on room air    3/10--> hemodynamically stable     Subjective (past 24 hours): complains of some discomfort to her left hip area rates 2 out of 10 but increases with movement, she is eating well and passing gas and had a bowel movement    Medications:  Reviewed    Infusion Medications    sodium chloride       Scheduled Medications    nitrofurantoin (macrocrystal-monohydrate)  100 mg Oral 2 times per day    fluconazole  200 mg Oral Daily    senna  2 tablet Oral BID    docusate sodium  100 mg Oral BID    polyethylene glycol  17 g Oral BID    sodium chloride flush  5-40 mL IntraVENous 2 times per day    acetaminophen  650 mg Oral Q6H    rivaroxaban  10 mg Oral Daily     PRN Meds: calcium carbonate, Calamine, diphenhydrAMINE-zinc acetate, magnesium hydroxide, bisacodyl, cyclobenzaprine, HYDROcodone-acetaminophen, sodium chloride flush, sodium chloride, ondansetron **OR** ondansetron, HYDROcodone 5 mg - acetaminophen, morphine      Intake/Output Summary (Last 24 hours) at 3/10/2022 0744  Last data filed at 3/10/2022 0431  Gross per 24 hour   Intake 1205 ml   Output 450 ml   Net 755 ml       Diet:  ADULT DIET; Regular    Exam:  BP (!) 133/58   Pulse 67   Temp 98.3 °F (36.8 °C) (Oral)   Resp 16   Ht 5' 2\" (1.575 m)   Wt 128 lb 11.2 oz (58.4 kg)   SpO2 94%   BMI 23.54 kg/m²     General appearance: No apparent distress, appears stated age and cooperative. HEENT: Pupils equal, round, and reactive to light. Conjunctivae/corneas clear. Neck: Supple, with full range of motion. No jugular venous distention. Trachea midline. Respiratory:  Normal respiratory effort. Clear to auscultation, bilaterally without Rales/Wheezes/Rhonchi.   Cardiovascular: Regular rate and rhythm with normal S1/S2 without murmurs, rubs or gallops. Abdomen: Soft, non-tender, non-distended with normal bowel sounds. Musculoskeletal: passive and active ROM x 4 extremities; family under states she ambulated well with walker, dressing noted to left hip with a small open area to the proximal aspect under the Tegaderm dressing. Skin: Skin color, texture, turgor normal.    Neurologic:  Neurovascularly intact without any focal sensory/motor deficits. Cranial nerves: II-XII intact, grossly non-focal.  Psychiatric: Alert and oriented, thought content appropriate  Capillary Refill: Brisk,< 3 seconds   Peripheral Pulses: +2 palpable, equal bilaterally     Labs:   Recent Labs     03/08/22 0442 03/08/22  0442 03/08/22  1422 03/09/22  0552 03/10/22  0552   WBC 5.0  --   --  4.5* 4.6*   HGB 7.9*   < > 8.9* 7.9* 8.4*   HCT 24.3*   < > 27.2* 24.3* 26.7*     --   --  189 254    < > = values in this interval not displayed. Recent Labs     03/08/22 0442 03/09/22  0552 03/10/22  0552    136 138   K 4.0 4.1 4.3    102 103   CO2 23 24 24   BUN 11 12 10   CREATININE 0.6 0.7 0.6   CALCIUM 8.4* 8.6 8.7     Microbiology:    Urine with Enterococcus    Urinalysis:      Lab Results   Component Value Date    NITRU NEGATIVE 03/06/2022    WBCUA  03/06/2022    BACTERIA MANY 03/06/2022    RBCUA 15-20 03/06/2022    BLOODU LARGE 03/06/2022    SPECGRAV 1.012 03/06/2022    GLUCOSEU NEGATIVE 08/04/2018       Radiology:  XR LUMBAR SPINE (2-3 VIEWS)    Result Date: 3/4/2022  PROCEDURE: XR LUMBAR SPINE (2-3 VIEWS) CLINICAL INFORMATION: low back pain, fall 3/3, COMPARISON: MRI lumbar spine dated 9/26/2018. TECHNIQUE: AP and lateral views of the lumbar spine. FINDINGS: There is a minimal dextrocurvature of the lumbar spine, stable compared to prior MRI.  There is grade 1 anterolisthesis of L4 relative to L5 on the basis of degenerative change, stable compared to prior exam. There is facet hypertrophy at the lower lumbar  levels, similar to prior MRI. There is diffuse demineralization. There is gaseous distention of large bowel. Stable degenerative changes of the lumbar spine. **This report has been created using voice recognition software. It may contain minor errors which are inherent in voice recognition technology. ** Final report electronically signed by Dr. Daniel Bowers MD on 3/4/2022 3:16 PM    XR HIP LEFT (2-3 VIEWS)    Result Date: 3/3/2022  PROCEDURE: XR HIP LEFT (2-3 VIEWS) CLINICAL INFORMATION: Fall TECHNIQUE: 2 views of the left hip COMPARISON: None FINDINGS: There is a minimally displaced comminuted intertrochanteric fracture of the proximal femur. No dislocation is identified. There is soft tissue swelling adjacent to the fracture site. Comminuted fracture of the proximal femur. Final report electronically signed by Dr. Giulia Palma on 3/3/2022 4:15 PM    XR FEMUR LEFT (MIN 2 VIEWS)    Result Date: 3/5/2022  PROCEDURE: XR FEMUR LEFT (MIN 2 VIEWS) CLINICAL INFORMATION: left femur orif intertan . COMPARISON: Left hip series dated 3/3/2022. TECHNIQUE: Fluoroscopic support was provided for ORIF of left femur fracture. There are 3 saved fluoroscopic images. Fluoroscopy time = 62.7 seconds. FINDINGS/IMPRESSION: Saved fluoroscopic images show intramedullary nail within the femur with cross threaded screws traversing the left femoral neck without evidence of hardware failure. There is anatomic alignment. **This report has been created using voice recognition software. It may contain minor errors which are inherent in voice recognition technology. ** Final report electronically signed by Dr. Daniel Bowers MD on 3/5/2022 8:11 AM    CT HEAD WO CONTRAST    Result Date: 3/3/2022  PROCEDURE: CT HEAD WO CONTRAST CLINICAL INFORMATION: Fall TECHNIQUE: CT scan of the head was performed from the vertex to the skull base without use of intravenous contrast. Axial images as well as coronal and sagittal reconstructions were obtained. All CT scans at this facility use dose modulation, iterative reconstruction, and/or weight-based dosing when appropriate to reduce radiation dose to as low as reasonably achievable. COMPARISON: CT head 8/4/2018 FINDINGS: There is no mass effect, midline shift or acute hemorrhage. Ventricles and CSF spaces are within normal limits. Gray-white matter differentiation is preserved. Visualized orbits, paranasal sinuses and mastoid air cells are unremarkable. No mass effect or acute hemorrhage. **This report has been created using voice recognition software. It may contain minor errors which are inherent in voice recognition technology. ** Final report electronically signed by Dr. Brigid Cruz on 3/3/2022 4:37 PM    CT CERVICAL SPINE WO CONTRAST    Result Date: 3/3/2022  PROCEDURE: CT CERVICAL SPINE WO CONTRAST CLINICAL INFORMATION: Fall TECHNIQUE: CT of the cervical spine was performed without use of intravenous contrast. Axial images as well as coronal and sagittal reconstructions were obtained. All CT scans at this facility use dose modulation, iterative reconstruction, and/or weight-based dosing when appropriate to reduce radiation dose to as low as reasonably achievable. COMPARISON: None FINDINGS: There is straightening of normal cervical lordosis. Disc space narrowing and osteophyte formation are present at the C4-C5, C5-C6 and C6-C7 levels. Cervical vertebral body heights are preserved. There is no fracture or spondylolisthesis. The atlantodental interval is normal. Neural foraminal narrowing is present in the bilateral C3-C4, C4-C5, C5-C6 and C6-C7 levels. There is mild central canal stenosis at these levels. Atherosclerotic calcifications are present in the bilateral extracranial carotid arteries. There is minimal scarring at the bilateral lung apices. 1. No fracture or spondylolisthesis of the cervical spine.  2. Multilevel degenerative disc disease,

## 2022-03-10 NOTE — PROGRESS NOTES
[facility]  Occupational Therapy  Daily Note  Time:   Time In: 1419  Time Out: 1444  Timed Code Treatment Minutes: 25 Minutes  Minutes: 25          Date: 3/10/2022  Patient Name: Cate Sloan,   Gender: female      Room: [facility], [facility]–198324/024-A  MRN: [id number]  : 1950  (66 y.o.)  Referring Practitioner: Mary Craven MD  Diagnosis: Closed Fracture of left Hip  Additional Pertinent Hx: Cate Sloan is an 66 y.o.  female who was at [facility] today and was reaching up for a toilet seat when she fell over and landed on her left side. She immediately had back and left hip pain and presented to the ED as she was unable to bear weight on her left leg. She denies distal paresthesias. Xray confirmed a left minimally displaced comminuted intertrochanteric fracture of the proximal femur. Pt is s/p L hip ORIF 3/3 by Dr Casie Suresh. Restrictions/Precautions:  Restrictions/Precautions: Weight Bearing,Fall Risk  Left Lower Extremity Weight Bearing: Weight Bearing As Tolerated  Required Braces or Orthoses  Spinal: Lumbar Corset  Position Activity Restriction  Spinal Precautions: No Bending,No Lifting,No Twisting     SUBJECTIVE: Pt laying in bed upon arrival. Pt agreeable to OT session, RN gave verbal approval for session. Pt's family present during session     PAIN: 9/10: L hip with pt receiving pain medication     Vitals: Nurse checked vitals prior to session    COGNITION: Slow Processing    ADL:   No ADL's completed this session. Joya Hamm BALANCE:  Sitting Balance:  Stand By Assistance. with pt sitting at the EOB in preparation for fcuntional mob  Standing Balance: [employer]. with vc's to push through BUE     BED MOBILITY:  Supine to Sit: Moderate Assistance with bringing hips and BLE to the EOB due to increased level of pain with HOB elevated with extended time needed to complete   Sit to Supine:  Moderate Assistance with bringing BLE back into bed due to increased pain    TRANSFERS:  Sit to Stand:  Contact Guard Assistance. with cues to push off of the bed  Stand to Sit: [address] vc's to reach back    FUNCTIONAL MOBILITY:  Assistive Device: Rolling Walker  Assist Level:  Contact Guard Assistance. Distance: short distance, with vc's to push through BLE to help decrease pain with LLE    ASSESSMENT:     Activity Tolerance:  Patient tolerance of  treatment: good. Discharge Recommendations: Inpatient Rehabilitation  Equipment Recommendations: Equipment Needed: Yes  Other: RW. monitor need for Meryle Zbigniew. Plan: Times per week: 6x  Current Treatment Recommendations: Strengthening,Balance Training,Functional Mobility Training,Endurance Training,Self-Care / ADL,Safety Education & Training,Patient/Caregiver Education & Training,Equipment Evaluation, Education, & procurement,Home Management Training    Patient Education  Patient Education: Precautions    Goals  Short term goals  Time Frame for Short term goals: Until discharge  Short term goal 1: Pt will complete BUE light resistive exercises with min vcs for technique to increase indep and safety with all self cares. Short term goal 2: Pt will complete standing tolerance x 4 minutes with min A +1 and min vcs for safety to increase indep with sinkside grooming. Short term goal 3: Pt will complete LB dressing with LHAE PRN and Mod A to increase indep within home environment. Short term goal 4: Pt will complete sit to stands transfers with min A +1 and min vcs for safety to increase indep with toileting. Short term goal 5: Pt will complete functional mobility short distances with SBA and min vcs for safety to increase indep with toileting. Following session, patient left in safe position with all fall risk precautions in place.

## 2022-03-10 NOTE — PROGRESS NOTES
[facility] RN called to update me that the case has been sent to the medical director for review. She states \"I should have an answer for you after lunch today\".

## 2022-03-10 NOTE — PROGRESS NOTES
[facility]  INPATIENT SPEECH THERAPY  [facility]  DAILY NOTE    TIME   SLP Individual Minutes  Time In: 1746  Time Out: 0940  Minutes: 26  Timed Code Treatment Minutes: 26 Minutes       Date: 3/10/2022  Patient Name: Michele Rg      CSN: [id number]   : 1950  (66 y.o.)  Gender: female   Referring Physician: Su Cortes MD  Diagnosis: Closed fracture of left hip  Secondary Diagnosis: cognitive deficits   Precautions: fall risk  Current Diet: Regular with thin liquids  Swallowing Strategies: Standard Universal Swallow Precautions  Date of Last MBS/FEES: Not Applicable    Pain:  No pain reported. Subjective:  JULIAN Haile approved ST attempt. Patient seen sitting in chair, pleasant and cooperative. No family present     Short-Term Goals:  SHORT TERM GOAL #1:  Goal 1: Patient to complete immediate/delayed recall and working memory tasks to 70% accuracy given minimal cues to improve retention of functional information. INTERVENTIONS:   Task 1: Patient given facts about ST     Immediate: 5/5 indep    Delayed recall (20 minutes): 3/5 indep, 2/5 category cue    Task 2: Recite 4 digit numbers forwards    Immediate: 5/5 indep   Task 3: Recite 4 digit numbers backwards    Working memory: 3/5 indep, 1/5 min cues, 1/5 max cues   Task 4: Reciting 4 tasks completed during session     Delayed recall: 1/4 min cues, 1/4 mod cues, 2/4 max cues     SHORT TERM GOAL #2:  Goal 2: Patient to complete reasoning and thought organizational tasks with 70% accuracy given minimal cues to improve skills required for IADL completion.   INTERVENTIONS:  Patient provided deduction puzzle to solve based on reasoning and organizing information provided: 2/5 indep, 1/5 min cues, 1/5 max cues, 1/5 could not elicit despite max cues     SHORT TERM GOAL #3:  Goal 3: Patient to complete problem solving and sequencing tasks to 70% accuracy given minimal cues to improve skills required for IADL completion. INTERVENTIONS:  Patient provided money word problems to solve. Patient calculated problems in head or using pen to paper: 4/5 correct independently       Long-Term Goals:   No LTGs due to short ELOS       EDUCATION:  Learner: Patient  Education:  Reviewed results and recommendations of this evaluation and Reviewed ST goals and Plan of Care  Evaluation of Education: Verbalizes understanding    ASSESSMENT/PLAN:  Activity Tolerance:  Patient tolerance of  treatment: good. Assessment/Plan: Patient progressing toward established goals. Continues to require skilled care of licensed speech pathologist to progress toward achievement of established goals and plan of care. .     Plan for Next Session: cognitive treatment     Dakota Olea, Student Intern

## 2022-03-10 NOTE — CARE COORDINATION
3/10/22, 9:20 AM EST    Planning IP rehab when precert approved; planning [phone] today. Awaiting insurance to approve stay. VM left for pt to return call

## 2022-03-11 VITALS
HEART RATE: 68 BPM | RESPIRATION RATE: 18 BRPM | BODY MASS INDEX: 23.68 KG/M2 | SYSTOLIC BLOOD PRESSURE: 119 MMHG | TEMPERATURE: 97.9 F | HEIGHT: 62 IN | WEIGHT: 128.7 LBS | DIASTOLIC BLOOD PRESSURE: 57 MMHG | OXYGEN SATURATION: 94 %

## 2022-03-11 LAB
ANION GAP SERPL CALCULATED.3IONS-SCNC: 9 MEQ/L (ref 8–16)
BUN BLDV-MCNC: 11 MG/DL (ref 7–22)
CALCIUM SERPL-MCNC: 8.7 MG/DL (ref 8.5–10.5)
CHLORIDE BLD-SCNC: 103 MEQ/L (ref 98–111)
CO2: 24 MEQ/L (ref 23–33)
CREAT SERPL-MCNC: 0.6 MG/DL (ref 0.4–1.2)
ERYTHROCYTE [DISTWIDTH] IN BLOOD BY AUTOMATED COUNT: 13.5 % (ref 11.5–14.5)
ERYTHROCYTE [DISTWIDTH] IN BLOOD BY AUTOMATED COUNT: 45.8 FL (ref 35–45)
GFR SERPL CREATININE-BSD FRML MDRD: > 90 ML/MIN/1.73M2
GLUCOSE BLD-MCNC: 96 MG/DL (ref 70–108)
HCT VFR BLD CALC: 25.7 % (ref 37–47)
HEMOGLOBIN: 8.1 GM/DL (ref 12–16)
MCH RBC QN AUTO: 29.3 PG (ref 26–33)
MCHC RBC AUTO-ENTMCNC: 31.5 GM/DL (ref 32.2–35.5)
MCV RBC AUTO: 93.1 FL (ref 81–99)
PLATELET # BLD: 259 THOU/MM3 (ref 130–400)
PMV BLD AUTO: 8.5 FL (ref 9.4–12.4)
POTASSIUM REFLEX MAGNESIUM: 4.7 MEQ/L (ref 3.5–5.2)
RBC # BLD: 2.76 MILL/MM3 (ref 4.2–5.4)
SODIUM BLD-SCNC: 136 MEQ/L (ref 135–145)
WBC # BLD: 5.5 THOU/MM3 (ref 4.8–10.8)

## 2022-03-11 PROCEDURE — 97116 GAIT TRAINING THERAPY: CPT

## 2022-03-11 PROCEDURE — 85027 COMPLETE CBC AUTOMATED: CPT

## 2022-03-11 PROCEDURE — 6370000000 HC RX 637 (ALT 250 FOR IP): Performed by: ORTHOPAEDIC SURGERY

## 2022-03-11 PROCEDURE — 97110 THERAPEUTIC EXERCISES: CPT

## 2022-03-11 PROCEDURE — 2580000003 HC RX 258: Performed by: ORTHOPAEDIC SURGERY

## 2022-03-11 PROCEDURE — 6370000000 HC RX 637 (ALT 250 FOR IP): Performed by: PHYSICIAN ASSISTANT

## 2022-03-11 PROCEDURE — 97530 THERAPEUTIC ACTIVITIES: CPT

## 2022-03-11 PROCEDURE — 80048 BASIC METABOLIC PNL TOTAL CA: CPT

## 2022-03-11 PROCEDURE — 97535 SELF CARE MNGMENT TRAINING: CPT

## 2022-03-11 PROCEDURE — 36415 COLL VENOUS BLD VENIPUNCTURE: CPT

## 2022-03-11 PROCEDURE — L0450 TLSO FLEX TRUNK/THOR PRE OTS: HCPCS

## 2022-03-11 RX ADMIN — HYDROCODONE BITARTRATE AND ACETAMINOPHEN 2 TABLET: 5; 325 TABLET ORAL at 16:12

## 2022-03-11 RX ADMIN — NITROFURANTOIN (MONOHYDRATE/MACROCRYSTALS) 100 MG: 75; 25 CAPSULE ORAL at 09:39

## 2022-03-11 RX ADMIN — HYDROCODONE BITARTRATE AND ACETAMINOPHEN 2 TABLET: 5; 325 TABLET ORAL at 12:09

## 2022-03-11 RX ADMIN — CYCLOBENZAPRINE 10 MG: 10 TABLET, FILM COATED ORAL at 09:39

## 2022-03-11 RX ADMIN — CYCLOBENZAPRINE 10 MG: 10 TABLET, FILM COATED ORAL at 17:50

## 2022-03-11 RX ADMIN — ACETAMINOPHEN 650 MG: 325 TABLET ORAL at 05:59

## 2022-03-11 RX ADMIN — RIVAROXABAN 10 MG: 10 TABLET, FILM COATED ORAL at 17:50

## 2022-03-11 RX ADMIN — DOCUSATE SODIUM 100 MG: 100 CAPSULE, LIQUID FILLED ORAL at 09:39

## 2022-03-11 RX ADMIN — SODIUM CHLORIDE, PRESERVATIVE FREE 10 ML: 5 INJECTION INTRAVENOUS at 09:39

## 2022-03-11 RX ADMIN — HYDROCODONE BITARTRATE AND ACETAMINOPHEN 2 TABLET: 5; 325 TABLET ORAL at 03:24

## 2022-03-11 ASSESSMENT — PAIN DESCRIPTION - LOCATION
LOCATION: LEG
LOCATION: LEG

## 2022-03-11 ASSESSMENT — PAIN DESCRIPTION - DESCRIPTORS
DESCRIPTORS: SORE
DESCRIPTORS: ACHING

## 2022-03-11 ASSESSMENT — PAIN SCALES - GENERAL
PAINLEVEL_OUTOF10: 3
PAINLEVEL_OUTOF10: 5
PAINLEVEL_OUTOF10: 7

## 2022-03-11 ASSESSMENT — PAIN DESCRIPTION - PAIN TYPE
TYPE: SURGICAL PAIN
TYPE: SURGICAL PAIN;ACUTE PAIN

## 2022-03-11 ASSESSMENT — PAIN DESCRIPTION - FREQUENCY
FREQUENCY: CONTINUOUS
FREQUENCY: OTHER (COMMENT)

## 2022-03-11 ASSESSMENT — PAIN DESCRIPTION - PROGRESSION
CLINICAL_PROGRESSION: NOT CHANGED
CLINICAL_PROGRESSION: GRADUALLY WORSENING

## 2022-03-11 ASSESSMENT — PAIN DESCRIPTION - ONSET
ONSET: ON-GOING
ONSET: ON-GOING

## 2022-03-11 ASSESSMENT — PAIN DESCRIPTION - ORIENTATION
ORIENTATION: LEFT;UPPER
ORIENTATION: LEFT

## 2022-03-11 NOTE — PROGRESS NOTES
Precert denied for acute inpatient rehab. Tildon Landau, CM updated. Dr. Alexander Erwin updated. Update 110 Spoke with patient and  at bedside explained to them that the precert for rehab was denied.

## 2022-03-11 NOTE — PROGRESS NOTES
Orthopaedic Progress Note      SUBJECTIVE:    Chief Complaint   Patient presents with    Hip Pain     left hip s/p mechanical fall    POD 7 s/p ORIF left hip with IMN  hgb 8.1  Pain controlled.        Physical    Vitals:    03/11/22 0330   BP: (!) 141/63   Pulse: 64   Resp: 20   Temp:    SpO2:            Data  CBC:   Lab Results   Component Value Date    WBC 5.5 03/11/2022    RBC 2.76 03/11/2022    HGB 8.1 03/11/2022    HCT 25.7 03/11/2022    MCV 93.1 03/11/2022    MCH 29.3 03/11/2022    MCHC 31.5 03/11/2022    RDW 13.5 08/25/2014     03/11/2022    MPV 8.5 03/11/2022     BMP:    Lab Results   Component Value Date     03/11/2022    K 4.7 03/11/2022     03/11/2022    CO2 24 03/11/2022    BUN 11 03/11/2022    LABALBU 4.2 08/04/2018    CREATININE 0.6 03/11/2022    CALCIUM 8.7 03/11/2022    LABGLOM >90 03/11/2022    GLUCOSE 96 03/11/2022     Uric Acid:  No components found for: URIC  PT/INR:    Lab Results   Component Value Date    INR 1.02 03/03/2022     PTT:    Lab Results   Component Value Date    APTT 28.0 03/03/2022   [APTT  Troponin:  No results found for: TROPONINI  Urine Culture:  No components found for: CURINE    Current Inpatient Medications    Current Facility-Administered Medications: nitrofurantoin (macrocrystal-monohydrate) (MACROBID) capsule 100 mg, 100 mg, Oral, 2 times per day  calcium carbonate (TUMS) chewable tablet 1,000 mg, 1,000 mg, Oral, TID PRN  Calamine 8-8 % lotion, , Topical, 4x Daily PRN  diphenhydrAMINE-zinc acetate cream, , Topical, TID PRN  senna (SENOKOT) tablet 17.2 mg, 2 tablet, Oral, BID  docusate sodium (COLACE) capsule 100 mg, 100 mg, Oral, BID  magnesium hydroxide (MILK OF MAGNESIA) 400 MG/5ML suspension 30 mL, 30 mL, Oral, BID PRN  polyethylene glycol (GLYCOLAX) packet 17 g, 17 g, Oral, BID  bisacodyl (DULCOLAX) suppository 10 mg, 10 mg, Rectal, Daily PRN  cyclobenzaprine (FLEXERIL) tablet 10 mg, 10 mg, Oral, TID PRN  HYDROcodone-acetaminophen (NORCO) 5-325 MG per tablet 2 tablet, 2 tablet, Oral, Q4H PRN  sodium chloride flush 0.9 % injection 5-40 mL, 5-40 mL, IntraVENous, 2 times per day  sodium chloride flush 0.9 % injection 5-40 mL, 5-40 mL, IntraVENous, PRN  0.9 % sodium chloride infusion, 25 mL, IntraVENous, PRN  acetaminophen (TYLENOL) tablet 650 mg, 650 mg, Oral, Q6H  ondansetron (ZOFRAN-ODT) disintegrating tablet 4 mg, 4 mg, Oral, Q8H PRN **OR** ondansetron (ZOFRAN) injection 4 mg, 4 mg, IntraVENous, Q6H PRN  rivaroxaban (XARELTO) tablet 10 mg, 10 mg, Oral, Daily  HYDROcodone-acetaminophen (NORCO) 5-325 MG per tablet 1 tablet, 1 tablet, Oral, Q4H PRN    . ASSESSMENT AND PLAN  Rehab hopefully today. Continue current therapy.

## 2022-03-11 NOTE — DISCHARGE SUMMARY
Stable    Plan  Follow up in 10-14 days. Patient was instructed on the use of pain medications, the signs and symptoms of infection, and was given our number to call should they have any questions or concerns following discharge.

## 2022-03-11 NOTE — PROGRESS NOTES
[facility] [facility]  Occupational Therapy  Daily Note  Time:   Time In: 08  Time Out: 08  Timed Code Treatment Minutes: 44 Minutes  Minutes: 39          Date: 3/11/2022  Patient Name: Jacqueline Garcia,   Gender: female      Room: -24/024-A  MRN: [id number]  : 1950  (66 y.o.)  Referring Practitioner: Lamberto Ortiz MD  Diagnosis: Closed Fracture of left Hip  Additional Pertinent Hx: Jacqueline Garcia is an 66 y.o.  female who was at [facility] today and was reaching up for a toilet seat when she fell over and landed on her left side. She immediately had back and left hip pain and presented to the ED as she was unable to bear weight on her left leg. She denies distal paresthesias. Xray confirmed a left minimally displaced comminuted intertrochanteric fracture of the proximal femur. Pt is s/p L hip ORIF 3/3 by Dr Judith Boyce. Restrictions/Precautions:  Restrictions/Precautions: Weight Bearing,Fall Risk  Left Lower Extremity Weight Bearing: Weight Bearing As Tolerated  Required Braces or Orthoses  Spinal: Lumbar Corset  Position Activity Restriction  Spinal Precautions: No Bending,No Lifting,No Twisting     SUBJECTIVE: Pt laying in bed upon arrival, pt agreeable to OT session, RN gave verbal approval for session     PAIN: 6/10: L hip    Vitals: Nurse checked vitals prior to session    COGNITION: WFL    ADL:   Bathing: Minimal Assistance. for posterior brandyn care with standing, with pt unable to complete LB due to precautions  Upper Extremity Dressing: Moderate Assistance. with donning brace, and min A with donning gown. BALANCE:  Sitting Balance:  Stand By Assistance. with completing ADL routine, with pt noted to have blood blister on L hip with RN notified  Standing Balance: Contact Guard Assistance.  with RW, with BUE support on walker with pt standing for 1 minute    BED MOBILITY:  Supine to Sit: Moderate Assistance with increased pain due to maintaining back precautions and L hip pain    TRANSFERS:  Sit to Stand:  Contact Guard Assistance. from the EOB with vc's to push off of the bed  Stand to Sit: [employer]. to recliner    FUNCTIONAL MOBILITY:  Assistive Device: Rolling Walker  Assist Level:  Contact Guard Assistance. Distance: HH distances with slow steady pace       ADDITIONAL ACTIVITIES:  Pt unable to recall back precautions and hip precautions this date. Pt educated with fair understanding. Pt provided ice packs to L hip following treatment. ASSESSMENT:     Activity Tolerance:  Patient tolerance of  treatment: good. Discharge Recommendations: Inpatient Rehabilitation  Equipment Recommendations: Equipment Needed: Yes  Other: RW. monitor need for [facility]. Plan: Times per week: 6x  Current Treatment Recommendations: Strengthening,Balance Training,Functional Mobility Training,Endurance Training,Self-Care / ADL,Safety Education & Training,Patient/Caregiver Education & Training,Equipment Evaluation, Education, & procurement,Home Management Training    Patient Education  Patient Education: ADL's    Goals  Short term goals  Time Frame for Short term goals: Until discharge  Short term goal 1: Pt will complete BUE light resistive exercises with min vcs for technique to increase indep and safety with all self cares. Short term goal 2: Pt will complete standing tolerance x 4 minutes with min A +1 and min vcs for safety to increase indep with sinkside grooming. Short term goal 3: Pt will complete LB dressing with LHAE PRN and Mod A to increase indep within home environment. Short term goal 4: Pt will complete sit to stands transfers with min A +1 and min vcs for safety to increase indep with toileting. Short term goal 5: Pt will complete functional mobility short distances with SBA and min vcs for safety to increase indep with toileting. Following session, patient left in safe position with all fall risk precautions in place.

## 2022-03-11 NOTE — CARE COORDINATION
Rafa Zhao was evaluated today and a DME order was entered for a wheeled walker because she requires this to successfully complete daily living tasks of bathing, toileting, personal cares and ambulating. A wheeled walker is necessary due to the patient's unsteady gait, upper body weakness, and inability to  an ambulation device; and she can ambulate only by pushing a walker instead of a lesser assistive device such as a cane, crutch, or standard walker. The need for this equipment was discussed with the patient and she understands and is in agreement.

## 2022-03-11 NOTE — PLAN OF CARE
Problem: Nutrition  Goal: Optimal nutrition therapy  Outcome: Ongoing   Nutrition Problem #1: Increased nutrient needs  Intervention: Food and/or Nutrient Delivery: Continue Current Diet  Nutritional Goals: Pt will consume greater than 75% of meals during LOS and demonstrate understanding of education provided.

## 2022-03-11 NOTE — PROGRESS NOTES
[address] [address]  INPATIENT PHYSICAL THERAPY  DAILY NOTE  [facility] 7K - 0R-02/281-E    Time In: 0845  Time Out: 2392  Timed Code Treatment Minutes: 32 Minutes  Minutes: 27          Date: 3/11/2022  Patient Name: Morgan Herrera,  Gender:  female        MRN: [id number]  : 1950  (66 y.o.)     Referring Practitioner: Monica Ag MD  Diagnosis: Closed fracture of left hip  Additional Pertinent Hx: Morgan Herrera is an 66 y.o.  female who was at [facility] today and was reaching up for a toilet seat when she fell over and landed on her left side. She immediately had back and left hip pain and presented to the ED as she was unable to bear weight on her left leg. She denies distal paresthesias. Xray confirmed a left minimally displaced comminuted intertrochanteric fracture of the proximal femur. Pt is s/p L hip ORIF 3/3 by Dr Mariana Choi. Prior Level of Function:  Lives With: Spouse  Type of Home: House  Home Layout: One level  Home Access: Stairs to enter without rails  Entrance Stairs - Number of Steps: 1  Home Equipment: Rolling walker   Bathroom Shower/Tub: Walk-in shower (3in threshold into shower)  Bathroom Toilet: Handicap height  Bathroom Equipment: Grab bars in shower,Shower chair  Bathroom Accessibility: Accessible    ADL Assistance: [address] [place] [address] Avenue: Independent  Homemaking Responsibilities: Yes  Ambulation Assistance: Independent  Transfer Assistance: Independent  Active : Yes  Additional Comments: Pt amb without AD    Restrictions/Precautions:  Restrictions/Precautions: Weight Bearing,Fall Risk  Left Lower Extremity Weight Bearing: Weight Bearing As Tolerated  Required Braces or Orthoses  Spinal: Lumbar Corset  Position Activity Restriction  Spinal Precautions: No Bending,No Lifting,No Twisting     SUBJECTIVE: RN approved session. Patient in chair upon arrival, and agreeable to therapy. Educated patient on role of inpatient rehab and POC.       PAIN: 2/10: L hip at rest; 7/10 with mobility     Vitals: Vitals not assessed per clinical judgement, see nursing flowsheet    OBJECTIVE:    Transfers:  Sit to Stand: Minimal Assistance, X 1, with increased time for completion, cues for hand placement, with verbal cues  Stand to Sit:Minimal Assistance, X 1, with increased time for completion, cues for hand placement, with verbal cues    Ambulation:  Contact Guard Assistance  Distance: 25 ft, 25 ft  Surface: Level Tile  Device:Rolling Walker  Gait Deviations: Forward Flexed Posture, Slow Antonia, Decreased Step Length Bilaterally, Decreased Weight Shift Bilaterally, Decreased Gait Speed, Decreased Heel Strike Bilaterally, Wide Base of Support and Decreased Terminal Knee Extension, antalgic gait    Exercise:  Patient was guided in 1 set(s) 10 reps of exercise to both lower extremities. Ankle pumps, Glut sets, Quad sets, Heelslides and Hip abduction/adduction. Exercises were completed for increased independence with functional mobility. Functional Outcome Measures: Completed  -PAC Inpatient Mobility Raw Score : 15  -PAC Inpatient T-Scale Score : 39.45    ASSESSMENT:  Assessment: Patient progressing toward established goals. Activity Tolerance:  Patient tolerance of  treatment: good. Equipment Recommendations: Other: will monitor needs pending progress  Discharge Recommendations: Inpatient Rehabilitation, 24 hour assistance or supervision and Patient would benefit from continued PT at discharge  Plan: Times per week: 6x O  Current Treatment Recommendations: Strengthening,Home Exercise Program,Safety Education & DARREN Montemayor Education & Training,Functional Mobility Training,Balance Training,Transfer Training    Patient Education  Patient Education: Plan of Care, Precautions/Restrictions, Transfers, Gait, Up in Chair for Julia Ochoa, Verbal Exercise Instruction    Goals:  Patient goals : to get better  Short term goals  Time Frame for Short term goals: by discharge  Short term goal 1: Pt to transfer supine <--> sit min A to enable pt to get in/out of bed. Short term goal 2: Pt to transfer sit <--> stand CGA for increased functional mobility. Short term goal 3: bed<>chair with LRD CGA x 1 for safe transfers  Short term goal 4: amb >10'x1 with walker and CGA to walk safely to bathroom  Long term goals  Time Frame for Long term goals : NA due to short length of stay. Following session, patient left in safe position with all fall risk precautions in place.

## 2022-03-11 NOTE — CARE COORDINATION
3/11/22, 2:19 PM EST    DISCHARGE ON GOING EVALUATION    Isidra Sevier day: 8    Barriers to Discharge: insurance denied IP Rehab    Patient Goals/Plan/Treatment Preferences: spoke with Steph Villa; she is upset and plans home with [facility]. She needs a RW.    1430  Patient chose [facility]; [facility] accepted pt. Will need PT/OT/aide, has shower chair, has one step into home, and will get new RW today before discharge. 3/11/22, 2:59 PM EST    Patient goals/plan/ treatment preferences discussed by  and . Patient goals/plan/ treatment preferences reviewed with patient/ family. Patient/ family verbalize understanding of discharge plan and are in agreement with goal/plan/treatment preferences. Understanding was demonstrated using the teach back method. AVS provided by RN at time of discharge, which includes all necessary medical information pertaining to the patients current course of illness, treatment, post-discharge goals of care, and treatment preferences.     Services After Discharge  Services At/After Discharge: OT,PT,Aide Services   IMM Letter  IMM Letter given to Patient/Family/Significant other/Guardian/POA/by[de-identified]   IMM Letter date given[de-identified] 03/09/22  IMM Letter time given[de-identified] [id number]

## 2022-03-11 NOTE — PROGRESS NOTES
[facility] THERAPY MISSED TREATMENT NOTE  [facility] 7K      Date: 3/11/2022  Patient Name: Rosalino Bond        MRN: [id number]    : 1950  (66 y.o.)    REASON FOR MISSED TREATMENT:  ST attempts to follow up with cognitive intervention; however, patient preparing for discharge to home setting. Denies needs. Reports cognitive skills back to baseline level function.      Radha Lee M.S. Nanda Milly 3/11/2022

## 2022-03-12 NOTE — PROGRESS NOTES
Pt given discharge orders with no visitors in room during teaching.  left to fill rx's. rx x 3 sent with . Follow up advised. All personal belongings sent with pt including new walker. To daughter génesis's car via wheelchair. While waiting on transport, pt did eat dinner.

## 2022-10-10 ENCOUNTER — HOSPITAL ENCOUNTER (OUTPATIENT)
Dept: WOMENS IMAGING | Age: 72
Discharge: HOME | End: 2022-10-10
Payer: MEDICARE

## 2022-10-10 DIAGNOSIS — Z87.81 HISTORY OF FRACTURE: ICD-10-CM

## 2022-10-10 DIAGNOSIS — Z72.0 TOBACCO USER: ICD-10-CM

## 2022-10-10 DIAGNOSIS — Z12.31 VISIT FOR SCREENING MAMMOGRAM: ICD-10-CM

## 2022-10-10 DIAGNOSIS — Z78.0 POSTMENOPAUSAL: ICD-10-CM

## 2022-10-10 PROCEDURE — 77063 BREAST TOMOSYNTHESIS BI: CPT

## 2022-10-10 PROCEDURE — 77080 DXA BONE DENSITY AXIAL: CPT

## 2022-11-15 ENCOUNTER — HOSPITAL ENCOUNTER (OUTPATIENT)
Dept: CT IMAGING | Age: 72
Discharge: HOME | End: 2022-11-15
Payer: MEDICARE

## 2022-11-15 DIAGNOSIS — R91.1 PULMONARY NODULE: ICD-10-CM

## 2022-11-15 DIAGNOSIS — R59.0 HILAR LYMPHADENOPATHY: ICD-10-CM

## 2022-11-15 LAB — POC CREATININE WHOLE BLOOD: 0.8 MG/DL (ref 0.5–1.2)

## 2022-11-15 PROCEDURE — 82565 ASSAY OF CREATININE: CPT

## 2022-11-15 PROCEDURE — 6360000004 HC RX CONTRAST MEDICATION: Performed by: FAMILY MEDICINE

## 2022-11-15 PROCEDURE — 71275 CT ANGIOGRAPHY CHEST: CPT

## 2022-11-15 RX ADMIN — IOPAMIDOL 80 ML: 755 INJECTION, SOLUTION INTRAVENOUS at 13:48

## 2023-03-14 ENCOUNTER — APPOINTMENT (OUTPATIENT)
Dept: GENERAL RADIOLOGY | Age: 73
End: 2023-03-14
Payer: MEDICARE

## 2023-03-14 ENCOUNTER — HOSPITAL ENCOUNTER (EMERGENCY)
Age: 73
Discharge: HOME | End: 2023-03-14
Payer: MEDICARE

## 2023-03-14 VITALS
OXYGEN SATURATION: 97 % | RESPIRATION RATE: 16 BRPM | BODY MASS INDEX: 22.15 KG/M2 | HEIGHT: 63 IN | HEART RATE: 105 BPM | TEMPERATURE: 98.4 F | SYSTOLIC BLOOD PRESSURE: 118 MMHG | WEIGHT: 125 LBS | DIASTOLIC BLOOD PRESSURE: 65 MMHG

## 2023-03-14 DIAGNOSIS — S93.401A SPRAIN OF RIGHT ANKLE, UNSPECIFIED LIGAMENT, INITIAL ENCOUNTER: ICD-10-CM
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
## 2023-03-14 DIAGNOSIS — S51.811A SKIN TEAR OF RIGHT FOREARM WITHOUT COMPLICATION, INITIAL ENCOUNTER: ICD-10-CM

## 2023-03-14 PROCEDURE — 99213 OFFICE O/P EST LOW 20 MIN: CPT

## 2023-03-14 PROCEDURE — 73610 X-RAY EXAM OF ANKLE: CPT

## 2023-03-14 PROCEDURE — 99203 OFFICE O/P NEW LOW 30 MIN: CPT | Performed by: NURSE PRACTITIONER

## 2023-03-14 ASSESSMENT — ENCOUNTER SYMPTOMS
EYE PAIN: 0
WHEEZING: 0
VOMITING: 0
NAUSEA: 0
SHORTNESS OF BREATH: 0
COUGH: 0
RHINORRHEA: 0
BLOOD IN STOOL: 0
CONSTIPATION: 0
DIARRHEA: 0
ABDOMINAL PAIN: 0

## 2023-03-14 ASSESSMENT — PAIN DESCRIPTION - PAIN TYPE: TYPE: ACUTE PAIN

## 2023-03-14 ASSESSMENT — PAIN - FUNCTIONAL ASSESSMENT: PAIN_FUNCTIONAL_ASSESSMENT: 0-10

## 2023-03-14 ASSESSMENT — PAIN DESCRIPTION - ORIENTATION: ORIENTATION: RIGHT

## 2023-03-14 ASSESSMENT — PAIN DESCRIPTION - LOCATION: LOCATION: ANKLE

## 2023-03-14 ASSESSMENT — PAIN SCALES - GENERAL: PAINLEVEL_OUTOF10: 7

## 2023-03-14 NOTE — DISCHARGE INSTRUCTIONS
Go to ER for worsening symptoms, chest pain, shortness of breath, inability keep liquids down, inability urinate for greater than 8 hours or numbness and tingling in the extremities. May take Tylenol or ibuprofen as needed for pain. Follow-up with your primary care provider. Apply ice as needed for comfort for up to 15 minutes at a time. Keep wound clean and dry. When Tegaderm falls off may use mild soap and lukewarm water to cleanse wound.

## 2023-03-14 NOTE — ED PROVIDER NOTES
[id number]       Chief Complaint   Patient presents with    Fall    Ankle Pain        Nurses Notes reviewed and I agree except as noted in the HPI. HISTORY OF PRESENT ILLNESS   Harsh Delgado is a 67 y.o. female who presents to urgent care with complaint of a fall this morning while changing the sheets on her bed where she twisted her right ankle and caused a skin tear on her right arm. Patient denies other injuries including hitting her head. Patient denies other symptoms including chest pain, shortness of breath or fever. Patient denies numbness or tingling. Patient is able to move the ankle and toes without difficulty. Dorsalis pedis and posterior tibialis pulses are 2+. Cap refill is less then 2 seconds at the nailbed. Sensation is intact to the toes. REVIEW OF SYSTEMS     Review of Systems   Constitutional:  Negative for appetite change, chills, fatigue, fever and unexpected weight change. HENT:  Negative for ear pain and rhinorrhea. Eyes:  Negative for pain and visual disturbance. Respiratory:  Negative for cough, shortness of breath and wheezing. Cardiovascular:  Negative for chest pain, palpitations and leg swelling. Gastrointestinal:  Negative for abdominal pain, blood in stool, constipation, diarrhea, nausea and vomiting. Genitourinary:  Negative for dysuria, frequency and hematuria. Musculoskeletal:  Positive for arthralgias (right ankle pain). Negative for joint swelling and neck stiffness. Skin:  Positive for wound. Negative for rash. Neurological:  Negative for dizziness, syncope, weakness, light-headedness and headaches. Hematological:  Does not bruise/bleed easily. PAST MEDICAL HISTORY         Diagnosis Date    Closed fracture of right shoulder 2020       SURGICAL HISTORY     Patient  has a past surgical history that includes Hysterectomy; Hip fracture surgery (Left, 03/03/2022);  Lumbar spine surgery (2019); and Ovary removal.    CURRENT MEDICATIONS       Previous Medications    IBUPROFEN (ADVIL;MOTRIN) 400 MG TABLET    Take 400 mg by mouth every 6 hours as needed for Pain       ALLERGIES     Patient is is allergic to pcn [penicillins]. FAMILY HISTORY     Patient'sfamily history includes Cancer in her mother and sister; Coronary Art Dis in her sister; Heart Attack in her maternal grandmother. SOCIAL HISTORY     Patient  reports that she has been smoking cigarettes. She started smoking about 60 years ago. She has a 50.00 pack-year smoking history. She has never used smokeless tobacco. She reports that she does not currently use alcohol. She reports that she does not use drugs. PHYSICAL EXAM     ED TRIAGE VITALS  BP: 118/65, Temp: 98.4 °F (36.9 °C), Heart Rate: (!) 105, Resp: 16, SpO2: 97 %  Physical Exam  Vitals and nursing note reviewed. Constitutional:       Appearance: She is well-developed. She is not diaphoretic. HENT:      Head: Normocephalic and atraumatic. Right Ear: External ear normal.      Left Ear: External ear normal.   Eyes:      Conjunctiva/sclera: Conjunctivae normal.   Cardiovascular:      Rate and Rhythm: Normal rate and regular rhythm. Pulses:           Dorsalis pedis pulses are 2+ on the right side. Posterior tibial pulses are 2+ on the right side. Heart sounds: Normal heart sounds. No murmur heard. No gallop. Pulmonary:      Effort: Pulmonary effort is normal. No respiratory distress. Breath sounds: Normal breath sounds. No stridor. No decreased breath sounds, wheezing, rhonchi or rales. Chest:      Chest wall: No tenderness. Abdominal:      General: There is no distension. Musculoskeletal:      Right forearm: No swelling, edema, deformity, lacerations, tenderness or bony tenderness. Arms:       Cervical back: Normal range of motion and neck supple. Right ankle: No swelling, deformity, ecchymosis or lacerations.  No tenderness. Decreased range of motion. Anterior drawer test negative. Normal pulse. Feet:      Right foot:      Skin integrity: No ulcer, blister, skin breakdown, erythema, warmth, callus, dry skin or fissure. Skin:     General: Skin is warm and dry. Neurological:      Mental Status: She is alert and oriented to person, place, and time. DIAGNOSTIC RESULTS   Labs:No results found for this visit on 03/14/23. IMAGING:  XR ANKLE RIGHT (MIN 3 VIEWS)   Final Result         1. No fracture. 2. Degenerative change. 3. Mild diffuse osteopenia. 4. Small ankle effusion. **This report has been created using voice recognition software. It may contain minor errors which are inherent in voice recognition technology. **      Final report electronically signed by DR Tricia Eaton on 3/14/2023 7:24 PM        URGENT CARE COURSE:        MDM      Patient presents to urgent care with complaint of a fall this morning while changing the sheets on her bed where she twisted her right ankle and caused a skin tear on her right arm. Movement, sensation and circulation are all intact. X-ray completed to rule out fracture or dislocation. Skin tear was cleansed with Shur-Clens and patted dry with sterile 4 x 4's. Wound was then dressed with a Tegaderm dressing. Bleeding is well controlled. Zuly Latberhane is reassuring. Patient be placed in an ankle brace due to age and needing to use a walker. Patient to follow-up with orthopedic [place] as needed. Patient instructed to go to ER for worsening symptoms, chest pain, shortness of breath, inability keep liquids down, inability urinate for greater than 8 hours or numbness and tingling in the extremities. May take Tylenol or ibuprofen as needed for pain. Follow-up with your primary care provider. Apply ice as needed for comfort for up to 15 minutes at a time. Medications - No data to display  PROCEDURES:    Procedures    FINALIMPRESSION      1.  Fall, initial encounter    2. Skin tear of right forearm without complication, initial encounter    3.  Sprain of right ankle, unspecified ligament, initial encounter        DISPOSITION/PLAN   DISPOSITION Decision To Discharge 03/14/2023 07:27:25 PM    PATIENT REFERRED TO:  Nina Diaz MD  [address] [address]  [phone]    In 2 days      [address]  [phone]      DISCHARGE MEDICATIONS:  New Prescriptions    No medications on file     Current Discharge Medication List          FLORIN Castillo CNP, APRN - CNP  03/14/23 [id number]

## 2023-03-14 NOTE — ED NOTES
Patient discharge instructions given to pt and pt verbalized understanding, no other needs at this time, and pt left in stable condition.      Macario Schneider RN  03/14/23 [id number]

## 2023-03-14 NOTE — ED NOTES
Pt complains of fall this am while making the bed. Now has right ankle pain and swelling. Also has skin tear on right forearm.      Adri Hernandez RN  03/14/23 [id number]

## 2023-05-18 ENCOUNTER — HOSPITAL ENCOUNTER (OUTPATIENT)
Dept: PULMONOLOGY | Age: 73
Discharge: HOME | End: 2023-05-18
Payer: MEDICARE

## 2023-05-18 DIAGNOSIS — J44.9 CHRONIC OBSTRUCTIVE PULMONARY DISEASE, UNSPECIFIED COPD TYPE (HCC): ICD-10-CM

## 2023-05-18 DIAGNOSIS — R05.9 COUGH, UNSPECIFIED TYPE: ICD-10-CM

## 2023-05-18 PROCEDURE — 94729 DIFFUSING CAPACITY: CPT

## 2023-05-18 PROCEDURE — 94060 EVALUATION OF WHEEZING: CPT

## 2023-05-18 PROCEDURE — 94726 PLETHYSMOGRAPHY LUNG VOLUMES: CPT

## 2023-05-18 NOTE — PLAN OF CARE
Problem: Skin Integrity:  Goal: Will show no infection signs and symptoms  Description: Will show no infection signs and symptoms  Outcome: Ongoing  Note: This patient had impaired skin integrity from surgery to repair fractured femur. Problem: Activity:  Goal: Ability to ambulate will improve  Description: Ability to ambulate will improve  Outcome: Ongoing  Note: Patient is ambulating with assistance. Problem: Cardiovascular  Goal: No DVT, peripheral vascular complications  Outcome: Ongoing  Note: Patient is receiving medication for prevention of DVT. Problem: GI  Goal: No bowel complications  Outcome: Ongoing  Note: Patient is using stool softeners to keep from having to strain for BM     Problem: GI  Goal: No bowel complications  Outcome: Ongoing  Note: Patient is using stool softeners to keep from having to strain for BM     Problem: Discharge Planning:  Goal: Discharged to appropriate level of care  Description: Discharged to appropriate level of care  Outcome: Ongoing  Note: Patient plans to be discharged to inpatient physical therapy. Care plan reviewed with patient. Patient verbalized understanding of the plan of care and contribute to goal setting. No

## 2023-12-08 DIAGNOSIS — M81.0 SENILE OSTEOPOROSIS: Primary | ICD-10-CM

## 2023-12-11 RX ORDER — EPINEPHRINE 1 MG/ML
0.3 INJECTION, SOLUTION, CONCENTRATE INTRAVENOUS PRN
OUTPATIENT
Start: 2023-12-12

## 2023-12-11 RX ORDER — DIPHENHYDRAMINE HYDROCHLORIDE 50 MG/ML
50 INJECTION INTRAMUSCULAR; INTRAVENOUS
OUTPATIENT
Start: 2023-12-12

## 2023-12-11 RX ORDER — SODIUM CHLORIDE 9 MG/ML
INJECTION, SOLUTION INTRAVENOUS CONTINUOUS
OUTPATIENT
Start: 2023-12-12

## 2024-01-23 ENCOUNTER — OFFICE VISIT (OUTPATIENT)
Dept: PULMONOLOGY | Age: 74
End: 2024-01-23
Payer: MEDICARE

## 2024-01-23 VITALS
OXYGEN SATURATION: 98 % | DIASTOLIC BLOOD PRESSURE: 68 MMHG | TEMPERATURE: 97.7 F | WEIGHT: 124 LBS | SYSTOLIC BLOOD PRESSURE: 124 MMHG | HEART RATE: 80 BPM | HEIGHT: 63 IN | BODY MASS INDEX: 21.97 KG/M2

## 2024-01-23 DIAGNOSIS — J44.9 ASTHMA-COPD OVERLAP SYNDROME: ICD-10-CM

## 2024-01-23 DIAGNOSIS — F17.200 SMOKER: ICD-10-CM

## 2024-01-23 DIAGNOSIS — J44.9 CHRONIC OBSTRUCTIVE PULMONARY DISEASE, UNSPECIFIED COPD TYPE (HCC): ICD-10-CM

## 2024-01-23 DIAGNOSIS — R06.02 SHORTNESS OF BREATH: Primary | ICD-10-CM

## 2024-01-23 DIAGNOSIS — Z87.891 PERSONAL HISTORY OF TOBACCO USE: ICD-10-CM

## 2024-01-23 PROBLEM — M50.30 DEGENERATION OF INTERVERTEBRAL DISC OF CERVICAL REGION: Status: ACTIVE | Noted: 2024-01-23

## 2024-01-23 PROBLEM — S42.231D: Status: ACTIVE | Noted: 2024-01-23

## 2024-01-23 PROBLEM — M75.01 ADHESIVE CAPSULITIS OF RIGHT SHOULDER: Status: ACTIVE | Noted: 2024-01-23

## 2024-01-23 PROBLEM — M48.02 SPINAL STENOSIS IN CERVICAL REGION: Status: ACTIVE | Noted: 2024-01-23

## 2024-01-23 PROBLEM — S42.251D: Status: ACTIVE | Noted: 2024-01-23

## 2024-01-23 PROBLEM — M43.12 SPONDYLOLISTHESIS, CERVICAL REGION: Status: ACTIVE | Noted: 2024-01-23

## 2024-01-23 PROBLEM — M50.20 DISPLACEMENT OF CERVICAL INTERVERTEBRAL DISC WITHOUT MYELOPATHY: Status: ACTIVE | Noted: 2024-01-23

## 2024-01-23 PROCEDURE — 99407 BEHAV CHNG SMOKING > 10 MIN: CPT | Performed by: INTERNAL MEDICINE

## 2024-01-23 PROCEDURE — 99205 OFFICE O/P NEW HI 60 MIN: CPT | Performed by: INTERNAL MEDICINE

## 2024-01-23 PROCEDURE — G0296 VISIT TO DETERM LDCT ELIG: HCPCS | Performed by: INTERNAL MEDICINE

## 2024-01-23 PROCEDURE — 1123F ACP DISCUSS/DSCN MKR DOCD: CPT | Performed by: INTERNAL MEDICINE

## 2024-01-23 RX ORDER — ESCITALOPRAM OXALATE 10 MG/1
10 TABLET ORAL DAILY
COMMUNITY

## 2024-01-23 RX ORDER — BUDESONIDE, GLYCOPYRROLATE, AND FORMOTEROL FUMARATE 160; 9; 4.8 UG/1; UG/1; UG/1
AEROSOL, METERED RESPIRATORY (INHALATION)
COMMUNITY
Start: 2024-01-01

## 2024-01-23 RX ORDER — SERTRALINE HYDROCHLORIDE 25 MG/1
TABLET, FILM COATED ORAL
COMMUNITY
Start: 2023-11-07 | End: 2024-01-23

## 2024-01-23 SDOH — HEALTH STABILITY: MENTAL HEALTH: COMPANY OF SMOKERS: 0

## 2024-01-23 SDOH — HEALTH STABILITY: MENTAL HEALTH: DRINKING COFFEE: 1

## 2024-01-23 ASSESSMENT — ENCOUNTER SYMPTOMS
HEARTBURN: 0
FREQUENT THROAT CLEARING: 1
VOICE CHANGE: 0
SHORTNESS OF BREATH: 1
WHEEZING: 0
SPUTUM PRODUCTION: 1
TROUBLE SWALLOWING: 0
COUGH: 1
DIFFICULTY BREATHING: 1
HEMOPTYSIS: 0
CHEST TIGHTNESS: 0

## 2024-01-23 ASSESSMENT — COPD QUESTIONNAIRES: COPD: 1

## 2024-01-23 NOTE — PATIENT INSTRUCTIONS
risk means. Some say people age 50 or older with at least a 20-pack-year smoking history are high risk. Others say it's people age 55 or older with a 30-pack-year history.  To see if you could benefit from screening, first find out if you are at high risk for lung cancer. Your doctor can help you decide your lung cancer risk.  What are the risks of screening?  CT screening for lung cancer isn't perfect. It can show an abnormal result when it turns out there wasn't any cancer. This is called a false-positive result. This means you may need more tests to make sure you don't have cancer. These tests can be harmful and cause a lot of worry.  These tests may include more CT scans and invasive testing like a lung biopsy. In a biopsy, the doctor takes a sample of tissue from inside your lung so it can be looked at under a microscope. A biopsy is the only way to tell if you have lung cancer. If the biopsy finds cancer, you and your doctor will have to decide how or whether to treat it.  Some lung cancers found on CT scans are harmless and would not have caused a problem if they had not been found through screening. But because doctors can't tell which ones will turn out to be harmless, most will be treated. This means that you may get treatment--including surgery, radiation, or chemotherapy--that you don't need.  There is a risk of damage to cells or tissue from being exposed to radiation, including the small amounts used in CTs, X-rays, and other medical tests. Over time, exposure to radiation may cause cancer and other health problems. But in most cases, the risk of getting cancer from being exposed to small amounts of radiation is low. It's not a reason to avoid these tests for most people.  What are the benefits of screening?  Your scan may be normal (negative).  For some people who are at higher risk, screening lowers the chance of dying of lung cancer. How much and how long you smoked helps to determine your risk

## 2024-01-23 NOTE — PROGRESS NOTES
[facility]    Patient: MILLI CRUM, 73 y.o.   : 1950  2024         Subjective     Chief Complaint   Patient presents with    New Patient     New patient. Referred for COPD.   CTA 10/22/2022  PFT 2023        COPD  She complains of cough, difficulty breathing, frequent throat clearing, shortness of breath and sputum production (usually clear but in the morning yellowish). There is no chest tightness, hemoptysis or wheezing. This is a chronic problem. The current episode started more than 1 year ago. The problem has been gradually worsening (coughing geting a little worse). The cough is productive of sputum. Associated symptoms include dyspnea on exertion. Pertinent negatives include no chest pain, fever, heartburn, sweats, trouble swallowing or weight loss. Her symptoms are aggravated by lying down. Her symptoms are alleviated by ipratropium, beta-agonist and steroid inhaler. She reports minimal improvement on treatment. Risk factors for lung disease include smoking/tobacco exposure. Her past medical history is significant for COPD and emphysema.   Nicotine Dependence  Presents for initial visit. Preferred tobacco types include cigarettes. Preferred cigarette types include filtered. Preferred cigarettes are non-menthol. Her urge triggers include drinking coffee and stress. Risk factors do not include company of smokers.Number of cigarettes per day: 1/2 ppd. now 6-7 cigarettes a day. She started smoking when she was 15-17 years old. Past treatments include nothing. Milli is thinking about quitting. Milli has tried to quit 0 times.       Patient has not been admitted or treated for pneumonia, pulmonary embolism, or respiratory failure.  Patient has not been intubated for reasons other than planned procedures.      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   Patient has not had exposure to aerosolized particles or hazardous fumes. (Coal, dust,

## 2024-01-29 ENCOUNTER — APPOINTMENT (OUTPATIENT)
Dept: GENERAL RADIOLOGY | Age: 74
End: 2024-01-29
Payer: MEDICARE

## 2024-01-29 ENCOUNTER — HOSPITAL ENCOUNTER (EMERGENCY)
Age: 74
Discharge: HOME | End: 2024-01-29
Attending: EMERGENCY MEDICINE
Payer: MEDICARE

## 2024-01-29 ENCOUNTER — APPOINTMENT (OUTPATIENT)
Dept: CT IMAGING | Age: 74
End: 2024-01-29
Payer: MEDICARE

## 2024-01-29 VITALS
HEIGHT: 63 IN | SYSTOLIC BLOOD PRESSURE: 137 MMHG | RESPIRATION RATE: 17 BRPM | DIASTOLIC BLOOD PRESSURE: 58 MMHG | BODY MASS INDEX: 21.97 KG/M2 | TEMPERATURE: 98.1 F | WEIGHT: 124 LBS | OXYGEN SATURATION: 95 % | HEART RATE: 68 BPM
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
## 2024-01-29 DIAGNOSIS — S42.212A FX HUMERAL NECK, LEFT, CLOSED, INITIAL ENCOUNTER: ICD-10-CM

## 2024-01-29 LAB
ANION GAP SERPL CALC-SCNC: 12 MEQ/L (ref 8–16)
BASOPHILS ABSOLUTE: 0 THOU/MM3 (ref 0–0.1)
BASOPHILS NFR BLD AUTO: 0.4 %
BUN SERPL-MCNC: 16 MG/DL (ref 7–22)
CALCIUM SERPL-MCNC: 9.2 MG/DL (ref 8.5–10.5)
CHLORIDE SERPL-SCNC: 100 MEQ/L (ref 98–111)
CO2 SERPL-SCNC: 25 MEQ/L (ref 23–33)
CREAT SERPL-MCNC: 0.8 MG/DL (ref 0.4–1.2)
DEPRECATED RDW RBC AUTO: 44.9 FL (ref 35–45)
EKG ATRIAL RATE: 77 BPM
EKG P AXIS: 83 DEGREES
EKG P-R INTERVAL: 144 MS
EKG Q-T INTERVAL: 392 MS
EKG QRS DURATION: 80 MS
EKG QTC CALCULATION (BAZETT): 443 MS
EKG R AXIS: 73 DEGREES
EKG T AXIS: 80 DEGREES
EKG VENTRICULAR RATE: 77 BPM
EOSINOPHIL NFR BLD AUTO: 0.6 %
EOSINOPHILS ABSOLUTE: 0.1 THOU/MM3 (ref 0–0.4)
ERYTHROCYTE [DISTWIDTH] IN BLOOD BY AUTOMATED COUNT: 13.9 % (ref 11.5–14.5)
GFR SERPL CREATININE-BSD FRML MDRD: > 60 ML/MIN/1.73M2
GLUCOSE SERPL-MCNC: 113 MG/DL (ref 70–108)
HCT VFR BLD AUTO: 44.3 % (ref 37–47)
HGB BLD-MCNC: 14.4 GM/DL (ref 12–16)
IMM GRANULOCYTES # BLD AUTO: 0.04 THOU/MM3 (ref 0–0.07)
IMM GRANULOCYTES NFR BLD AUTO: 0.4 %
LYMPHOCYTES ABSOLUTE: 1.1 THOU/MM3 (ref 1–4.8)
LYMPHOCYTES NFR BLD AUTO: 11.3 %
MCH RBC QN AUTO: 28.9 PG (ref 26–33)
MCHC RBC AUTO-ENTMCNC: 32.5 GM/DL (ref 32.2–35.5)
MCV RBC AUTO: 88.8 FL (ref 81–99)
MONOCYTES ABSOLUTE: 0.6 THOU/MM3 (ref 0.4–1.3)
MONOCYTES NFR BLD AUTO: 5.8 %
NEUTROPHILS NFR BLD AUTO: 81.5 %
NRBC BLD AUTO-RTO: 0 /100 WBC
OSMOLALITY SERPL CALC.SUM OF ELEC: 275.8 MOSMOL/KG (ref 275–300)
PLATELET # BLD AUTO: 229 THOU/MM3 (ref 130–400)
PMV BLD AUTO: 9.3 FL (ref 9.4–12.4)
POTASSIUM SERPL-SCNC: 4.5 MEQ/L (ref 3.5–5.2)
RBC # BLD AUTO: 4.99 MILL/MM3 (ref 4.2–5.4)
SEGMENTED NEUTROPHILS ABSOLUTE COUNT: 8 THOU/MM3 (ref 1.8–7.7)
SODIUM SERPL-SCNC: 137 MEQ/L (ref 135–145)
TROPONIN, HIGH SENSITIVITY: 30 NG/L (ref 0–12)
TROPONIN, HIGH SENSITIVITY: 30 NG/L (ref 0–12)
WBC # BLD AUTO: 9.8 THOU/MM3 (ref 4.8–10.8)

## 2024-01-29 PROCEDURE — 70450 CT HEAD/BRAIN W/O DYE: CPT

## 2024-01-29 PROCEDURE — 6370000000 HC RX 637 (ALT 250 FOR IP): Performed by: STUDENT IN AN ORGANIZED HEALTH CARE EDUCATION/TRAINING PROGRAM

## 2024-01-29 PROCEDURE — 93010 ELECTROCARDIOGRAM REPORT: CPT | Performed by: INTERNAL MEDICINE

## 2024-01-29 PROCEDURE — 73030 X-RAY EXAM OF SHOULDER: CPT

## 2024-01-29 PROCEDURE — 36415 COLL VENOUS BLD VENIPUNCTURE: CPT

## 2024-01-29 PROCEDURE — 99285 EMERGENCY DEPT VISIT HI MDM: CPT

## 2024-01-29 PROCEDURE — 85025 COMPLETE CBC W/AUTO DIFF WBC: CPT

## 2024-01-29 PROCEDURE — 80048 BASIC METABOLIC PNL TOTAL CA: CPT

## 2024-01-29 PROCEDURE — 84484 ASSAY OF TROPONIN QUANT: CPT

## 2024-01-29 PROCEDURE — 6360000002 HC RX W HCPCS: Performed by: STUDENT IN AN ORGANIZED HEALTH CARE EDUCATION/TRAINING PROGRAM

## 2024-01-29 PROCEDURE — 71045 X-RAY EXAM CHEST 1 VIEW: CPT

## 2024-01-29 PROCEDURE — 72125 CT NECK SPINE W/O DYE: CPT

## 2024-01-29 PROCEDURE — 96374 THER/PROPH/DIAG INJ IV PUSH: CPT

## 2024-01-29 PROCEDURE — 72170 X-RAY EXAM OF PELVIS: CPT

## 2024-01-29 PROCEDURE — 96375 TX/PRO/DX INJ NEW DRUG ADDON: CPT

## 2024-01-29 PROCEDURE — 93005 ELECTROCARDIOGRAM TRACING: CPT | Performed by: STUDENT IN AN ORGANIZED HEALTH CARE EDUCATION/TRAINING PROGRAM

## 2024-01-29 RX ORDER — HYDROCODONE BITARTRATE AND ACETAMINOPHEN 5; 325 MG/1; MG/1
1 TABLET ORAL EVERY 6 HOURS PRN
Qty: 12 TABLET | Refills: 0 | Status: SHIPPED | OUTPATIENT
Start: 2024-01-29 | End: 2024-02-01

## 2024-01-29 RX ORDER — FENTANYL CITRATE 50 UG/ML
50 INJECTION, SOLUTION INTRAMUSCULAR; INTRAVENOUS ONCE
Status: COMPLETED | OUTPATIENT
Start: 2024-01-29 | End: 2024-01-29

## 2024-01-29 RX ORDER — HYDROCODONE BITARTRATE AND ACETAMINOPHEN 5; 325 MG/1; MG/1
1 TABLET ORAL EVERY 6 HOURS PRN
Qty: 12 TABLET | Refills: 0 | Status: SHIPPED | OUTPATIENT
Start: 2024-01-29 | End: 2024-01-29

## 2024-01-29 RX ORDER — HYDROCODONE BITARTRATE AND ACETAMINOPHEN 5; 325 MG/1; MG/1
1 TABLET ORAL
Status: COMPLETED | OUTPATIENT
Start: 2024-01-29 | End: 2024-01-29

## 2024-01-29 RX ADMIN — HYDROCODONE BITARTRATE AND ACETAMINOPHEN 1 TABLET: 5; 325 TABLET ORAL at 11:08

## 2024-01-29 RX ADMIN — FENTANYL CITRATE 50 MCG: 50 INJECTION INTRAMUSCULAR; INTRAVENOUS at 11:08

## 2024-01-29 RX ADMIN — HYDROMORPHONE HYDROCHLORIDE 0.5 MG: 1 INJECTION, SOLUTION INTRAMUSCULAR; INTRAVENOUS; SUBCUTANEOUS at 13:14

## 2024-01-29 ASSESSMENT — PAIN - FUNCTIONAL ASSESSMENT
PAIN_FUNCTIONAL_ASSESSMENT: 0-10
PAIN_FUNCTIONAL_ASSESSMENT: 0-10

## 2024-01-29 ASSESSMENT — PAIN SCALES - GENERAL
PAINLEVEL_OUTOF10: 8
PAINLEVEL_OUTOF10: 6
PAINLEVEL_OUTOF10: 6
PAINLEVEL_OUTOF10: 4
PAINLEVEL_OUTOF10: 9
PAINLEVEL_OUTOF10: 10

## 2024-01-29 ASSESSMENT — PAIN DESCRIPTION - DESCRIPTORS: DESCRIPTORS: SHARP;SHOOTING

## 2024-01-29 ASSESSMENT — PAIN DESCRIPTION - PAIN TYPE: TYPE: ACUTE PAIN

## 2024-01-29 ASSESSMENT — PAIN DESCRIPTION - FREQUENCY: FREQUENCY: CONTINUOUS

## 2024-01-29 ASSESSMENT — PAIN DESCRIPTION - LOCATION
LOCATION: SHOULDER
LOCATION: ARM

## 2024-01-29 ASSESSMENT — PAIN DESCRIPTION - ORIENTATION: ORIENTATION: LEFT

## 2024-01-29 NOTE — DISCHARGE INSTR - COC
Continuity of Care Form    Patient Name: Milli Rice   :  1950  MRN:  [id number]    Admit date:  2024  Discharge date:  ***    Code Status Order: Prior   Advance Directives:     Admitting Physician:  No admitting provider for patient encounter.  PCP: Garret Milner MD    Discharging Nurse: ***  Discharging Hospital Unit/Room#: 22/022A  Discharging Unit Phone Number: ***    Emergency Contact:   Extended Emergency Contact Information  Primary Emergency Contact: Birdie Lucero  Address: [address] [place] [id number] [facility]  Home Phone: [phone]  Relation: Child  Secondary Emergency Contact: DwayneFransisco  Address: [address] [place] [id number] [facility]  Home Phone: [phone]  Mobile Phone: [phone]  Relation: Spouse    Past Surgical History:  Past Surgical History:   Procedure Laterality Date    HIP FRACTURE SURGERY Left 2022    LEFT FEMUR ORIF INTERTAN performed by Boris Ashford MD at [facility] OR    HYSTERECTOMY (CERVIX STATUS UNKNOWN)      LUMBAR SPINE SURGERY  2019    OVARY REMOVAL         Immunization History:   Immunization History   Administered Date(s) Administered    COVID-19, MODERNA BLUE border, Primary or Immunocompromised, (age 12y+), IM, 100 mcg/0.5mL 2021, 2021, 2021, 10/22/2021    COVID-19, PFIZER Bivalent, DO NOT Dilute, (age 12y+), IM, 30 mcg/0.3 mL 2022    Influenza, FLUZONE (age 65 y+), High Dose, 0.7mL 2021, 10/01/2023    Unknown Immunization 2020       Active Problems:  Patient Active Problem List   Diagnosis Code    Intertrochanteric fracture of left femur, closed, initial encounter ([facility]) S72.142A    Senile osteoporosis M81.0    Adhesive capsulitis of right shoulder M75.01    3-part fracture of surgical neck of right humerus with routine healing S42.231D    Degeneration of intervertebral disc of cervical region M50.30    Displacement of cervical

## 2024-01-29 NOTE — ED TRIAGE NOTES
Pt presented via [facility] with chief complain of a fall around 0830. Pt states she was down for 45 minutes. EMS reports no LOC or blood thinners. Pt states he socks slide leading to a mechanical fall. Respirations are even and unlabored with no acute distress noted

## 2024-01-29 NOTE — ED PROVIDER NOTES
[facility]  EMERGENCY DEPARTMENT ENCOUNTER          Pt Name: Milli Rice  MRN: [id number]  Birthdate 1950  Date of evaluation: 1/29/2024  Emergency Physician: Aleksander Teresa MD    CHIEF COMPLAINT       Chief Complaint   Patient presents with    Shoulder Pain    Fall     History obtained from the patient.      HISTORY OF PRESENT ILLNESS    HPI  Milli Rice is a 73 y.o. female with past medical history of COPD, emphysema, tobacco abuse who presents to the emergency department for evaluation of fall and left shoulder pain.  Patient states that earlier this morning she attempted to get up from a sitting position and felt that her socks tripped her up and she subsequently fell onto her left side.  No LOC reported.  No neck pain reported.  Thinks she may have hit her head.  She had no preceding lightheadedness, dizziness, headache, back pain, chest pain, or abdominal pain.  She is not on blood thinners.  The patient has no other acute complaints at this time.          REVIEW OF SYSTEMS   Review of Systems    See HPI. 12 point ROS performed, pertinent positives listed above otherwise negative  PAST MEDICAL AND SURGICAL HISTORY     Past Medical History:   Diagnosis Date    Closed fracture of right shoulder 2020     Past Surgical History:   Procedure Laterality Date    HIP FRACTURE SURGERY Left 03/03/2022    LEFT FEMUR ORIF INTERTAN performed by Boris [facility]ford MD at [facility]    HYSTERECTOMY (CERVIX STATUS UNKNOWN)      LUMBAR SPINE SURGERY  2019    OVARY REMOVAL           MEDICATIONS   No current facility-administered medications for this encounter.    Current Outpatient Medications:     HYDROcodone-acetaminophen (NORCO) 5-325 MG per tablet, Take 1 tablet by mouth every 6 hours as needed for Pain for up to 3 days. Intended supply: 3 days. Take lowest dose possible to manage pain Max Daily Amount: 4 tablets, Disp: 12 tablet, Rfl: 0    BREZTRI AEROSPHERE 160-9-4.8 MCG/ACT

## 2024-01-29 NOTE — DISCHARGE INSTRUCTIONS
Take your medication as indicated and prescribed.   For pain use ibuprofen (Motrin / Advil) or acetaminophen (Tylenol), unless prescribed medications that have acetaminophen in it.  You can take over the counter acetaminophen tablets (1 - 2 tablets of the 500-mg strength every 6 hours) or ibuprofen tablets (2 tablets every 4 hours).    Wear the splint at all times until you are seen by the orthopedic surgeon.  Keep your hand elevated above your heart as much as possible to help reduce swelling.    PLEASE RETURN TO THE EMERGENCY DEPARTMENT IMMEDIATELY for worsening symptoms, pain not controlled with the prescribed / over the counter pain medication, numbness or tingling in your hands, unable to lift your wrist up, or if you develop any concerning symptoms such as: high fever not relieved by acetaminophen (Tylenol) and/or ibuprofen (Motrin / Advil), chills, shortness of breath, chest pain, feeling of your heart fluttering or racing, persistent nausea and/or vomiting, vomiting up blood, blood in your stool, numbness, loss of consciousness, weakness or tingling in the arms or legs or change in color of the extremities, changes in mental status, persistent headache, blurry vision, loss of bladder / bowel control, unable to follow up with your physician, or other any other care or concern.

## 2024-02-05 ENCOUNTER — HOSPITAL ENCOUNTER (EMERGENCY)
Age: 74
Discharge: HOME | End: 2024-02-05
Payer: MEDICARE

## 2024-02-05 VITALS
DIASTOLIC BLOOD PRESSURE: 82 MMHG | BODY MASS INDEX: 22.14 KG/M2 | HEART RATE: 64 BPM | RESPIRATION RATE: 16 BRPM | SYSTOLIC BLOOD PRESSURE: 138 MMHG | WEIGHT: 125 LBS | OXYGEN SATURATION: 97 % | TEMPERATURE: 97.9 F

## 2024-02-05 DIAGNOSIS — K59.00 CONSTIPATION, UNSPECIFIED CONSTIPATION TYPE: Primary | ICD-10-CM

## 2024-02-05 LAB
BILIRUB UR STRIP.AUTO-MCNC: NEGATIVE MG/DL
CHARACTER UR: CLEAR
COLOR: YELLOW
GLUCOSE UR QL STRIP.AUTO: NEGATIVE MG/DL
KETONES UR QL STRIP.AUTO: ABNORMAL
NITRITE UR QL STRIP.AUTO: NEGATIVE
PH UR STRIP.AUTO: 5.5 [PH] (ref 5–9)
PROT UR STRIP.AUTO-MCNC: NEGATIVE MG/DL
RBC #/AREA URNS HPF: NEGATIVE /[HPF]
SP GR UR STRIP.AUTO: 1.01 (ref 1–1.03)
UROBILINOGEN, URINE: 1 EU/DL (ref 0.2–1)
WBC #/AREA URNS HPF: ABNORMAL /[HPF]

## 2024-02-05 PROCEDURE — 87086 URINE CULTURE/COLONY COUNT: CPT

## 2024-02-05 PROCEDURE — 81003 URINALYSIS AUTO W/O SCOPE: CPT

## 2024-02-05 PROCEDURE — 99213 OFFICE O/P EST LOW 20 MIN: CPT

## 2024-02-05 PROCEDURE — 99213 OFFICE O/P EST LOW 20 MIN: CPT | Performed by: NURSE PRACTITIONER

## 2024-02-05 RX ORDER — HYDROCODONE BITARTRATE AND ACETAMINOPHEN 5; 325 MG/1; MG/1
TABLET ORAL
COMMUNITY
Start: 2024-01-31 | End: 2024-02-05

## 2024-02-05 RX ORDER — DOCUSATE SODIUM 100 MG/1
100 CAPSULE, LIQUID FILLED ORAL 2 TIMES DAILY
Qty: 60 CAPSULE | Refills: 0 | Status: SHIPPED | OUTPATIENT
Start: 2024-02-05 | End: 2024-03-06

## 2024-02-05 RX ORDER — POLYETHYLENE GLYCOL 3350 17 G/17G
17 POWDER, FOR SOLUTION ORAL DAILY PRN
Qty: 510 G | Refills: 0 | Status: SHIPPED | OUTPATIENT
Start: 2024-02-05 | End: 2024-03-06

## 2024-02-05 ASSESSMENT — ENCOUNTER SYMPTOMS
NAUSEA: 0
SORE THROAT: 0
DIARRHEA: 0
VOMITING: 0
SHORTNESS OF BREATH: 0
CONSTIPATION: 1
ABDOMINAL PAIN: 1

## 2024-02-05 ASSESSMENT — PAIN - FUNCTIONAL ASSESSMENT
PAIN_FUNCTIONAL_ASSESSMENT: PREVENTS OR INTERFERES SOME ACTIVE ACTIVITIES AND ADLS
PAIN_FUNCTIONAL_ASSESSMENT: NONE - DENIES PAIN

## 2024-02-05 ASSESSMENT — PAIN DESCRIPTION - FREQUENCY: FREQUENCY: CONTINUOUS

## 2024-02-05 NOTE — ED PROVIDER NOTES
[facility] URGENT CARE  Urgent Care Encounter       CHIEF COMPLAINT       Chief Complaint   Patient presents with    Urinary Frequency       Nurses Notes reviewed and I agree except as noted in the HPI.  HISTORY OF PRESENT ILLNESS   Milli Rice is a 73 y.o. female who presents for eval ration of lower abdominal pressure and urinary frequency per report of the patient's daughter.  Patient denies any pain with urination but does report the lower abdominal tenderness/pressure as well.  She denies any fever or chills.  States that this been ongoing for the past week.  Reports that the patient has also not had a bowel movement in the past week but has not taken any medications to help with this.    The history is provided by the patient and a relative.       REVIEW OF SYSTEMS     Review of Systems   Constitutional:  Negative for chills and fever.   HENT:  Negative for congestion and sore throat.    Respiratory:  Negative for shortness of breath.    Cardiovascular:  Negative for chest pain.   Gastrointestinal:  Positive for abdominal pain and constipation. Negative for diarrhea, nausea and vomiting.   Genitourinary:  Positive for frequency. Negative for dysuria.   Musculoskeletal:  Negative for myalgias.   Skin:  Negative for rash.   Allergic/Immunologic: Positive for immunocompromised state.   Neurological:  Negative for headaches.       PAST MEDICAL HISTORY         Diagnosis Date    Closed fracture of right shoulder 2020       SURGICALHISTORY     Patient  has a past surgical history that includes Hysterectomy; Hip fracture surgery (Left, 03/03/2022); Lumbar spine surgery (2019); and Ovary removal.    CURRENT MEDICATIONS       Previous Medications    ESCITALOPRAM (LEXAPRO) 10 MG TABLET    Take 1 tablet by mouth daily    FLUTICASONE-UMECLIDIN-VILANT (TRELEGY ELLIPTA) 200-62.5-25 MCG/ACT AEPB INHALER    Inhale 1 puff into the lungs daily    HYDROCODONE-ACETAMINOPHEN (NORCO) 5-325 MG PER TABLET    1  tab(s) orally every 6 hours prn pain for 5 days    IBUPROFEN (ADVIL;MOTRIN) 400 MG TABLET    Take 1 tablet by mouth every 6 hours as needed for Pain       ALLERGIES     Patient is is allergic to pcn [penicillins].    Patients   Immunization History   Administered Date(s) Administered    COVID-19, MODERNA BLUE border, Primary or Immunocompromised, (age 12y+), IM, 100 mcg/0.5mL 02/04/2021, 02/04/2021, 03/04/2021, 10/22/2021    COVID-19, PFIZER Bivalent, DO NOT Dilute, (age 12y+), IM, 30 mcg/0.3 mL 09/09/2022    Influenza, FLUZONE (age 65 y+), High Dose, 0.7mL 09/23/2021, 10/01/2023    Unknown Immunization 09/03/2020       FAMILY HISTORY     Patient's family history includes Cancer in her mother and sister; Coronary Art Dis in her sister; Heart Attack in her maternal grandmother; Lung Cancer in her mother; Lung Cancer (age of onset: 76) in her sister.    SOCIAL HISTORY     Patient  reports that she has been smoking cigarettes. She started smoking about 61 years ago. She has a 61.1 pack-year smoking history. She has never used smokeless tobacco. She reports that she does not currently use alcohol. She reports that she does not use drugs.    PHYSICAL EXAM     ED TRIAGE VITALS  BP: 138/82, Temp: 97.9 °F (36.6 °C), Pulse: 64, Respirations: 16, SpO2: 97 %,Estimated body mass index is 22.14 kg/m² as calculated from the following:    Height as of 1/29/24: 1.6 m (5' 3\").    Weight as of this encounter: 56.7 kg (125 lb).,No LMP recorded. Patient has had a hysterectomy.    Physical Exam  Vitals and nursing note reviewed.   Abdominal:      Palpations: Abdomen is soft.      Tenderness: There is abdominal tenderness in the right lower quadrant, suprapubic area and left lower quadrant. There is no right CVA tenderness, left CVA tenderness or guarding.         DIAGNOSTIC RESULTS     Labs:  Results for orders placed or performed during the hospital encounter of 02/05/24   Urinalysis   Result Value Ref Range    Glucose, Ur Negative

## 2024-02-05 NOTE — ED TRIAGE NOTES
Milli arrives to room with complaint of  bladder pressure, urinary frequency, low abd pain  symptoms started 1 weeks ago.

## 2024-02-06 LAB
BACTERIA UR CULT: ABNORMAL
ORGANISM: ABNORMAL

## 2024-12-17 ENCOUNTER — OFFICE VISIT (OUTPATIENT)
Dept: PULMONOLOGY | Age: 74
End: 2024-12-17
Payer: MEDICARE

## 2024-12-17 VITALS
TEMPERATURE: 97.5 F | HEIGHT: 63 IN | WEIGHT: 128.4 LBS | SYSTOLIC BLOOD PRESSURE: 138 MMHG | BODY MASS INDEX: 22.75 KG/M2 | OXYGEN SATURATION: 100 % | DIASTOLIC BLOOD PRESSURE: 62 MMHG | HEART RATE: 73 BPM

## 2024-12-17 DIAGNOSIS — Z87.891 PERSONAL HISTORY OF TOBACCO USE: ICD-10-CM

## 2024-12-17 DIAGNOSIS — R05.3 CHRONIC COUGH: ICD-10-CM

## 2024-12-17 DIAGNOSIS — Z87.891 PERSONAL HISTORY OF TOBACCO USE, PRESENTING HAZARDS TO HEALTH: ICD-10-CM

## 2024-12-17 DIAGNOSIS — J45.50 SEVERE PERSISTENT ASTHMA WITHOUT COMPLICATION: ICD-10-CM

## 2024-12-17 DIAGNOSIS — Z72.0 TOBACCO ABUSE: ICD-10-CM

## 2024-12-17 DIAGNOSIS — J44.9 CHRONIC OBSTRUCTIVE PULMONARY DISEASE, UNSPECIFIED COPD TYPE (HCC): Primary | ICD-10-CM

## 2024-12-17 PROCEDURE — 99214 OFFICE O/P EST MOD 30 MIN: CPT | Performed by: INTERNAL MEDICINE

## 2024-12-17 PROCEDURE — G0296 VISIT TO DETERM LDCT ELIG: HCPCS | Performed by: INTERNAL MEDICINE

## 2024-12-17 PROCEDURE — 1159F MED LIST DOCD IN RCRD: CPT | Performed by: INTERNAL MEDICINE

## 2024-12-17 PROCEDURE — 99406 BEHAV CHNG SMOKING 3-10 MIN: CPT | Performed by: INTERNAL MEDICINE

## 2024-12-17 PROCEDURE — 1160F RVW MEDS BY RX/DR IN RCRD: CPT | Performed by: INTERNAL MEDICINE

## 2024-12-17 PROCEDURE — 1123F ACP DISCUSS/DSCN MKR DOCD: CPT | Performed by: INTERNAL MEDICINE

## 2024-12-17 RX ORDER — BUDESONIDE AND FORMOTEROL FUMARATE DIHYDRATE 160; 4.5 UG/1; UG/1
2 AEROSOL RESPIRATORY (INHALATION) 2 TIMES DAILY
Qty: 2 EACH | Refills: 11 | Status: SHIPPED | OUTPATIENT
Start: 2024-12-17 | End: 2025-12-17

## 2024-12-17 RX ORDER — PANTOPRAZOLE SODIUM 40 MG/1
40 TABLET, DELAYED RELEASE ORAL DAILY
Qty: 30 TABLET | Refills: 3 | Status: SHIPPED | OUTPATIENT
Start: 2024-12-17

## 2024-12-17 SDOH — HEALTH STABILITY: MENTAL HEALTH: DRINKING COFFEE: 1

## 2024-12-17 SDOH — HEALTH STABILITY: MENTAL HEALTH: COMPANY OF SMOKERS: 0

## 2024-12-17 ASSESSMENT — ENCOUNTER SYMPTOMS
HEARTBURN: 0
FREQUENT THROAT CLEARING: 1
WHEEZING: 0
HEMOPTYSIS: 0
TROUBLE SWALLOWING: 0
DIFFICULTY BREATHING: 1
SPUTUM PRODUCTION: 1
SHORTNESS OF BREATH: 1
VOICE CHANGE: 0
COUGH: 1
CHEST TIGHTNESS: 0

## 2024-12-17 ASSESSMENT — COPD QUESTIONNAIRES: COPD: 1

## 2024-12-17 NOTE — PROGRESS NOTES
Skin:     General: Skin is warm and dry.      Capillary Refill: Capillary refill takes less than 2 seconds.   Neurological:      General: No focal deficit present.      Mental Status: She is alert and oriented to person, place, and time.   Psychiatric:         Mood and Affect: Mood normal.         Behavior: Behavior normal.          results   Lung Nodule Screening     [x] Qualifies    [] Does not qualify   [] Declined    [] Completed     [facility] [place] annual screening for lung cancer with low-dose computed tomography (LDCT) in adults   aged 50 to 80 years   20 pack-year smoking history and   currently smoke or have quit within the past 15 years.     Screening should be discontinued once a person has not smoked for 15 years or develops a health problem that substantially limits life expectancy or the ability or willingness to have curative lung surgery.     Assessment      Diagnosis Orders   1. Chronic obstructive pulmonary disease, unspecified COPD type (HCC)        2. Severe persistent asthma without complication             CTA chest 11/2022   some emphysema, 1 fissure nodule but a decrease in size, few other nodules that were stable     1/29/2024   IgE level pending     PFTs with FEV1 of 73% with a positive bronchodilator response  Mild air trapping and mildly decreased DLCO does look to be some airway obstruction on on flow-volume loops    Plan     -Current smoker does qualify for lung low-dose CT, patient willing to restart this will get LDCT  -Trelegy inhaler too expensive. Will try symbicort and pt will tell us if it is still too expensive. Went over inhaler technique  - will start dupixant with her asthma and COPD.   - IgE and CBC is ordered, will follow up with her next visit to get these. Will also check anca  - will get A1AT swab today  - repeat cassi ordered   -next visit will refer to pulm rehab  -With chronic cough as will will start some protonix, if helps will try to wean to 20mg next

## 2024-12-17 NOTE — PATIENT INSTRUCTIONS
good idea to know your test results and keep a list of the medicines you take.  Where can you learn more?  Go to https://www.[employer].net/patientEd and enter Q940 to learn more about \"Learning About Lung Cancer Screening.\"  Current as of: October 25, 2023  Content Version: 14.2  © 2024 [employer].   Care instructions adapted under license by [employer]. If you have questions about a medical condition or this instruction, always ask your healthcare professional. [employer] disclaims any warranty or liability for your use of this information.

## 2024-12-18 ENCOUNTER — TELEPHONE (OUTPATIENT)
Dept: PULMONOLOGY | Age: 74
End: 2024-12-18

## 2024-12-18 NOTE — TELEPHONE ENCOUNTER
Attempted to call patient, I was not able to get through, phone continued to ring busy.   Patient was seen yesterday and informed Kranthi since I was out that she would be getting new RX coverage at the first of the year. Since patient is going to be getting new coverage at the first of the year, Is it okay to wait for her follow up in a couple weeks before starting the dupixent, once we have a copy of her new prescription coverage?

## 2024-12-18 NOTE — TELEPHONE ENCOUNTER
Spoke with the patient's daughter she said that will be fine to wait until then to start the process of that injection. & she was wanting to know is that a medication we can help administer for her, I informed yes we can and she asked if they would be something they will have to  from the pharmacy or something we already have in the office for when the patient do come in for her injections? Please advise!

## 2025-01-07 ENCOUNTER — HOSPITAL ENCOUNTER (OUTPATIENT)
Dept: CT IMAGING | Age: 75
Discharge: HOME | End: 2025-01-07
Attending: INTERNAL MEDICINE
Payer: MEDICARE

## 2025-01-07 ENCOUNTER — HOSPITAL ENCOUNTER (OUTPATIENT)
Dept: PULMONOLOGY | Age: 75
Discharge: HOME | End: 2025-01-07
Attending: INTERNAL MEDICINE
Payer: MEDICARE

## 2025-01-07 DIAGNOSIS — Z87.891 PERSONAL HISTORY OF TOBACCO USE: ICD-10-CM

## 2025-01-07 DIAGNOSIS — J44.9 CHRONIC OBSTRUCTIVE PULMONARY DISEASE, UNSPECIFIED COPD TYPE (HCC): ICD-10-CM

## 2025-01-07 DIAGNOSIS — J45.50 SEVERE PERSISTENT ASTHMA WITHOUT COMPLICATION: ICD-10-CM

## 2025-01-07 PROCEDURE — 94010 BREATHING CAPACITY TEST: CPT

## 2025-01-07 PROCEDURE — 71271 CT THORAX LUNG CANCER SCR C-: CPT

## 2025-01-14 ENCOUNTER — OFFICE VISIT (OUTPATIENT)
Dept: PULMONOLOGY | Age: 75
End: 2025-01-14
Payer: MEDICARE

## 2025-01-14 VITALS
BODY MASS INDEX: 24.14 KG/M2 | WEIGHT: 131.2 LBS | TEMPERATURE: 97.9 F | HEART RATE: 81 BPM | DIASTOLIC BLOOD PRESSURE: 60 MMHG | SYSTOLIC BLOOD PRESSURE: 128 MMHG | OXYGEN SATURATION: 99 % | HEIGHT: 62 IN

## 2025-01-14 DIAGNOSIS — J44.89 ASTHMA-COPD OVERLAP SYNDROME (HCC): Primary | ICD-10-CM

## 2025-01-14 DIAGNOSIS — Z72.0 TOBACCO ABUSE: Chronic | ICD-10-CM

## 2025-01-14 PROBLEM — S72.142A INTERTROCHANTERIC FRACTURE OF LEFT FEMUR, CLOSED, INITIAL ENCOUNTER (HCC): Status: RESOLVED | Noted: 2022-03-03 | Resolved: 2025-01-14

## 2025-01-14 PROCEDURE — 1123F ACP DISCUSS/DSCN MKR DOCD: CPT | Performed by: INTERNAL MEDICINE

## 2025-01-14 PROCEDURE — 1160F RVW MEDS BY RX/DR IN RCRD: CPT | Performed by: INTERNAL MEDICINE

## 2025-01-14 PROCEDURE — 1159F MED LIST DOCD IN RCRD: CPT | Performed by: INTERNAL MEDICINE

## 2025-01-14 PROCEDURE — 99214 OFFICE O/P EST MOD 30 MIN: CPT | Performed by: INTERNAL MEDICINE

## 2025-01-14 RX ORDER — FLUTICASONE PROPIONATE AND SALMETEROL 250; 50 UG/1; UG/1
1 POWDER RESPIRATORY (INHALATION) EVERY 12 HOURS
Qty: 1 EACH | Refills: 11 | Status: SHIPPED | OUTPATIENT
Start: 2025-01-14 | End: 2026-01-14

## 2025-01-14 ASSESSMENT — ENCOUNTER SYMPTOMS
VOICE CHANGE: 0
COUGH: 1
SHORTNESS OF BREATH: 1
WHEEZING: 0
TROUBLE SWALLOWING: 0
CHEST TIGHTNESS: 0
FREQUENT THROAT CLEARING: 1
DIFFICULTY BREATHING: 1
SPUTUM PRODUCTION: 1
HEMOPTYSIS: 0
HEARTBURN: 0

## 2025-01-14 ASSESSMENT — COPD QUESTIONNAIRES: COPD: 1

## 2025-01-14 NOTE — PROGRESS NOTES
[facility]    Patient: HALEY CRUM, 74 y.o.   : 1950  2025         Subjective     Chief Complaint   Patient presents with    Follow-up     COPD 1 month f/u after [place] and CT lung screen 25.        COPD  She complains of cough, difficulty breathing, frequent throat clearing, shortness of breath and sputum production (usually clear but in the morning yellowish). There is no chest tightness, hemoptysis or wheezing. This is a chronic problem. The current episode started more than 1 year ago. The problem has been gradually worsening (no improvement, was better on trelegy inhaler, worse on symbicort, using rescue inhaler regularly). The cough is productive of sputum (worse in the morning). Associated symptoms include dyspnea on exertion. Pertinent negatives include no chest pain, fever, heartburn, sweats, trouble swallowing or weight loss. Her symptoms are aggravated by lying down, exposure to smoke and change in weather. Her symptoms are alleviated by ipratropium, beta-agonist and steroid inhaler. She reports minimal (was helpful with trelegy) improvement on treatment. Ineffective treatments: PPI didnt help cough at all. Risk factors for lung disease include smoking/tobacco exposure. Her past medical history is significant for asthma, COPD and emphysema.   Nicotine Dependence  Presents for follow-up visit. The symptoms have been stable. She smokes < 1/2 a pack (1/2 ppd. was down to 5 cig a day, back up to 1/2 ppd. not interested in quitting right now) of cigarettes per day. Compliance with prior treatments has been poor.       Patient has not been admitted or treated for pneumonia, pulmonary embolism, or respiratory failure.  Patient has not been intubated for reasons other than planned procedures.      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   Patient has not had exposure to aerosolized particles or hazardous fumes. (Coal, dust, asbestos, molds ie

## 2025-02-05 ENCOUNTER — TELEPHONE (OUTPATIENT)
Dept: PULMONOLOGY | Age: 75
End: 2025-02-05

## 2025-02-05 NOTE — TELEPHONE ENCOUNTER
Patient called in today stating she received her dupixant injection today. I scheduled her a nurse visit to help administer the injection for 2/19/25. Looks like she is needing the Epi Pen could you please put in a script for that? Please advise.

## 2025-02-06 DIAGNOSIS — J45.50 SEVERE PERSISTENT ASTHMA WITHOUT COMPLICATION: Primary | ICD-10-CM

## 2025-02-06 DIAGNOSIS — J45.50 SEVERE PERSISTENT ASTHMA WITHOUT COMPLICATION: ICD-10-CM

## 2025-02-06 RX ORDER — EPINEPHRINE 0.3 MG/.3ML
0.3 INJECTION SUBCUTANEOUS ONCE
Qty: 1 EACH | Refills: 2 | Status: SHIPPED | OUTPATIENT
Start: 2025-02-06 | End: 2025-02-06

## 2025-02-06 RX ORDER — EPINEPHRINE 0.3 MG/.3ML
0.3 INJECTION SUBCUTANEOUS ONCE
Qty: 1 EACH | Refills: 2 | Status: SHIPPED | OUTPATIENT
Start: 2025-02-06 | End: 2025-02-06 | Stop reason: SDUPTHER

## 2025-02-06 NOTE — TELEPHONE ENCOUNTER
Spoke with patient and notified her. I informed her it was sent to [facility]'s pharmacy and she mentioned they don't go to that pharmacy could re sent the script to [facility] on [address]. Please advise.

## 2025-02-12 ENCOUNTER — NURSE ONLY (OUTPATIENT)
Dept: PULMONOLOGY | Age: 75
End: 2025-02-12
Payer: MEDICARE

## 2025-02-12 VITALS
HEART RATE: 80 BPM | WEIGHT: 129 LBS | HEIGHT: 62 IN | DIASTOLIC BLOOD PRESSURE: 82 MMHG | TEMPERATURE: 96.4 F | OXYGEN SATURATION: 98 % | BODY MASS INDEX: 23.74 KG/M2 | SYSTOLIC BLOOD PRESSURE: 130 MMHG

## 2025-02-12 DIAGNOSIS — J44.89 ASTHMA-COPD OVERLAP SYNDROME (HCC): Primary | ICD-10-CM

## 2025-02-12 PROCEDURE — 96372 THER/PROPH/DIAG INJ SC/IM: CPT | Performed by: INTERNAL MEDICINE

## 2025-02-12 RX ORDER — DUPILUMAB 300 MG/2ML
300 INJECTION, SOLUTION SUBCUTANEOUS
COMMUNITY
Start: 2025-02-03

## 2025-02-12 NOTE — PROGRESS NOTES
ADMINISTERED DUPIXENT 300 MG ( X2 , LOADING DOSE. TOTAL  MG) (MEDICATION NAME AND DOSE) SUBCUTANEOUSLY  into the LEFT & RIGHT UPPER ARM ( 2 INJECTIONS FOR LOADING DOSE)   (upper arm, thigh, or abdomen). Patient observed for 30 minutes.  Patient tolerated well without any adverse reaction.        NDC # : 5875-7546-31    Lot #: [id number]    EXP: 9.30.2026      Medication was supplied by patient:  YES/NO: yes        Medication was shipped to patient  YES/NO: yes   Medication was shipped to the office YES/NO: no     Medication was supplied as a sample:  YES/NO: No    PATIENT SUPPLIED EPI PEN WITH EXP DATE 11.2025                        Patient has tolerated injection well.  No signs and symptoms of reaction noted in the office.    Patient was instructed to report any problems questions or concerns to the office immediately.         Patient understands adverse reactions of medication.

## 2025-02-26 ENCOUNTER — NURSE ONLY (OUTPATIENT)
Dept: PULMONOLOGY | Age: 75
End: 2025-02-26

## 2025-02-26 VITALS
DIASTOLIC BLOOD PRESSURE: 78 MMHG | HEIGHT: 62 IN | OXYGEN SATURATION: 96 % | BODY MASS INDEX: 23.92 KG/M2 | TEMPERATURE: 98.7 F | SYSTOLIC BLOOD PRESSURE: 126 MMHG | HEART RATE: 76 BPM | WEIGHT: 130 LBS

## 2025-02-26 DIAGNOSIS — J44.89 ASTHMA-COPD OVERLAP SYNDROME (HCC): Primary | ICD-10-CM

## 2025-02-26 NOTE — PROGRESS NOTES
ADMINISTERED dupixent 300mg (MEDICATION NAME AND DOSE) SUBCUTANEOUSLY  into the RIGHT UPPER ARM  (upper arm, thigh, or abdomen). Patient observed for 30 minutes.  Patient tolerated well without any adverse reaction.        NDC # : 5558-6537-51    Lot #: [id number]    EXP: 10.31.2026      Medication was supplied by patient:  YES/NO: yes        Medication was shipped to patient  YES/NO: yes   Medication was shipped to the office YES/NO: no     Medication was supplied as a sample:  YES/NO: No                        Patient has tolerated injection well.  No signs and symptoms of reaction noted in the office.    Patient was instructed to report any problems questions or concerns to the office immediately.         Patient understands adverse reactions of medication.

## 2025-03-12 ENCOUNTER — NURSE ONLY (OUTPATIENT)
Dept: PULMONOLOGY | Age: 75
End: 2025-03-12
Payer: MEDICARE

## 2025-03-12 VITALS
WEIGHT: 131.4 LBS | SYSTOLIC BLOOD PRESSURE: 124 MMHG | OXYGEN SATURATION: 99 % | HEIGHT: 62 IN | TEMPERATURE: 98.2 F | DIASTOLIC BLOOD PRESSURE: 72 MMHG | HEART RATE: 41 BPM | BODY MASS INDEX: 24.18 KG/M2

## 2025-03-12 DIAGNOSIS — J45.50 SEVERE PERSISTENT ASTHMA WITHOUT COMPLICATION (HCC): Primary | ICD-10-CM

## 2025-03-12 PROCEDURE — 96372 THER/PROPH/DIAG INJ SC/IM: CPT | Performed by: INTERNAL MEDICINE

## 2025-03-12 NOTE — PROGRESS NOTES
Administered Dupixent subcutaneously in right upper arm.  -  Dosage : 300 mg  NDC: 7697-9484-96  Lot # : [id number]  Expiration Date : 10/31/2026  -  Medication shipped to patient   Patient Supplied? : Yes  Medication form: auto-pen injector  Pharmacy : [facility]  Pulmonary Provider : Dr. Ng  -  Patient advised to remain in clinic 15 minutes post-injection to monitor for adverse reactions.  Patient instructed to alert staff if any adverse reactions arise.  -  Next injection to be in 2 weeks  -  Electronically signed by Clare Del Rosario LPN on 3/12/2025 at 12:55 PM

## 2025-03-26 ENCOUNTER — CLINICAL SUPPORT (OUTPATIENT)
Dept: PULMONOLOGY | Age: 75
End: 2025-03-26

## 2025-03-26 VITALS
HEART RATE: 81 BPM | TEMPERATURE: 97.5 F | HEIGHT: 63 IN | OXYGEN SATURATION: 97 % | DIASTOLIC BLOOD PRESSURE: 84 MMHG | BODY MASS INDEX: 24.1 KG/M2 | SYSTOLIC BLOOD PRESSURE: 132 MMHG | WEIGHT: 136 LBS

## 2025-03-26 DIAGNOSIS — J45.50 SEVERE PERSISTENT ASTHMA WITHOUT COMPLICATION (HCC): Primary | ICD-10-CM

## 2025-03-26 DIAGNOSIS — J44.89 ASTHMA-COPD OVERLAP SYNDROME (HCC): ICD-10-CM

## 2025-03-26 DIAGNOSIS — R06.02 SHORTNESS OF BREATH: ICD-10-CM

## 2025-03-26 NOTE — PROGRESS NOTES
ADMINISTERED Dupixent  SUBCUTANEOUSLY  into the Right upper arm  (upper arm, thigh, or abdomen). Patient observed for 30 minutes.  Patient tolerated well without any adverse reaction.        NDC # : 7621-7557-52    Lot #: [id number]     EXP: 10/31/2026      Medication was supplied by patient:  YES/NO: yes        Medication was shipped to patient  YES/NO: yes   Medication was shipped to the office YES/NO: no     Medication was supplied as a sample:  YES/NO: No                        Patient has tolerated injection well.  No signs and symptoms of reaction noted in the office.    Patient was instructed to report any problems questions or concerns to the office immediately.         Patient understands adverse reactions of medication.

## 2025-04-09 ENCOUNTER — CLINICAL SUPPORT (OUTPATIENT)
Dept: PULMONOLOGY | Age: 75
End: 2025-04-09

## 2025-04-09 VITALS
HEIGHT: 63 IN | WEIGHT: 131 LBS | BODY MASS INDEX: 23.21 KG/M2 | DIASTOLIC BLOOD PRESSURE: 70 MMHG | SYSTOLIC BLOOD PRESSURE: 112 MMHG | TEMPERATURE: 97.2 F | OXYGEN SATURATION: 97 % | HEART RATE: 66 BPM

## 2025-04-09 DIAGNOSIS — J44.9 CHRONIC OBSTRUCTIVE PULMONARY DISEASE, UNSPECIFIED COPD TYPE (HCC): ICD-10-CM

## 2025-04-09 DIAGNOSIS — J45.50 SEVERE PERSISTENT ASTHMA WITHOUT COMPLICATION (HCC): ICD-10-CM

## 2025-04-09 DIAGNOSIS — J44.89 ASTHMA-COPD OVERLAP SYNDROME (HCC): Primary | ICD-10-CM

## 2025-04-09 NOTE — PROGRESS NOTES
ADMINISTERED Dupixent 300mg (MEDICATION NAME AND DOSE) SUBCUTANEOUSLY  into the Left upper arm   (upper arm, thigh, or abdomen). Patient observed for 30 minutes.  Patient tolerated well without any adverse reaction.        NDC # : 5300-2325-11    Lot #: [id number]     EXP: 10/31/2026      Medication was supplied by patient:  YES/NO: yes        Medication was shipped to patient  YES/NO: yes   Medication was shipped to the office YES/NO: no     Medication was supplied as a sample:  YES/NO: No                        Patient has tolerated injection well.  No signs and symptoms of reaction noted in the office.    Patient was instructed to report any problems questions or concerns to the office immediately         Patient understands adverse reactions of medication.         While I was getting patients vitals, I did ask if she thought Dupixent was working for her. She stated that she does not think it is helping her breathing as much as she would hope it would. I advised her to just continue coming for them until her follow up with Dr. Ng scheduled on 5/5/25. She verbalized understanding. Next injection will be due on 4/23/25

## 2025-04-23 ENCOUNTER — CLINICAL SUPPORT (OUTPATIENT)
Dept: PULMONOLOGY | Age: 75
End: 2025-04-23

## 2025-04-23 VITALS
SYSTOLIC BLOOD PRESSURE: 124 MMHG | OXYGEN SATURATION: 99 % | HEART RATE: 73 BPM | BODY MASS INDEX: 24.37 KG/M2 | TEMPERATURE: 97.8 F | HEIGHT: 62 IN | WEIGHT: 132.4 LBS | DIASTOLIC BLOOD PRESSURE: 62 MMHG

## 2025-04-23 DIAGNOSIS — J45.50 SEVERE PERSISTENT ASTHMA WITHOUT COMPLICATION (HCC): ICD-10-CM

## 2025-04-23 DIAGNOSIS — J44.89 ASTHMA-COPD OVERLAP SYNDROME (HCC): Primary | ICD-10-CM

## 2025-04-23 DIAGNOSIS — J44.9 CHRONIC OBSTRUCTIVE PULMONARY DISEASE, UNSPECIFIED COPD TYPE (HCC): ICD-10-CM

## 2025-04-23 DIAGNOSIS — R06.02 SHORTNESS OF BREATH: ICD-10-CM

## 2025-04-23 NOTE — PROGRESS NOTES
ADMINISTERED Dupixent 300mg/2ml SUBCUTANEOUSLY  into the Left upper arm. Patient observed for 30 minutes.  Patient tolerated well without any adverse reaction.        NDC # : 7500-6954-69    Lot #: [id number]    EXP: 12/31/2026      Medication was supplied by patient:  YES/NO: yes        Medication was shipped to patient  YES/NO: yes   Medication was shipped to the office YES/NO: no     Medication was supplied as a sample:  YES/NO: No                        Patient has tolerated injection well.  No signs and symptoms of reaction noted in the office.    Patient was instructed to report any problems questions or concerns to the office immediately.         Patient understands adverse reactions of medication.

## 2025-05-02 NOTE — PROGRESS NOTES
[facility]    Patient: HALEY CRUM, 74 y.o.   : 1950  2025         Subjective   No chief complaint on file.       HPI    Patient {HAS HAS NOT:21743} been admitted or treated for pneumonia, pulmonary embolism, or respiratory failure.  Patient {HAS HAS NOT:77875} been intubated for reasons other than planned procedures.      Social History  Patient job history included  Patient {HAS HAS NOT:29003} had exposure to aerosolized particles or hazardous fumes. 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  {Actions; denies/admits to:5300} to active tobacco smoking *** PPD for *** years for *** pack years   {Actions; denies/admits to:5300} exposure to pets/animals at home.  {Actions; denies/admits to:5300} exposure to tuberculosis.  {Actions; denies/admits to:5300} hobbies they can no longer perform due to shortness of breath    Flu vaccine?  Pneumonia vaccine?  Covid?  RSV?   Past Medical hx   PMH:  Past Medical History:   Diagnosis Date    Closed fracture of right shoulder     Intertrochanteric fracture of left femur, closed, initial encounter ([facility]) 2022     SURGICAL HISTORY:  Past Surgical History:   Procedure Laterality Date    HIP FRACTURE SURGERY Left 2022    LEFT FEMUR ORIF INTERTAN performed by Boris Ashford MD at [facility]    HYSTERECTOMY (CERVIX STATUS UNKNOWN)      LUMBAR SPINE SURGERY  2019    OVARY REMOVAL       SOCIAL HISTORY:  Social History     Tobacco Use    Smoking status: Every Day     Current packs/day: 1.00     Average packs/day: 1 pack/day for 62.3 years (62.3 ttl pk-yrs)     Types: Cigarettes     Start date: 1963    Smokeless tobacco: Never    Tobacco comments:     1 ppd 2025   Vaping Use    Vaping status: Never Used   Substance Use Topics    Alcohol use:

## 2025-05-05 ENCOUNTER — OFFICE VISIT (OUTPATIENT)
Dept: PULMONOLOGY | Age: 75
End: 2025-05-05
Payer: MEDICARE

## 2025-05-05 VITALS
SYSTOLIC BLOOD PRESSURE: 132 MMHG | BODY MASS INDEX: 24.22 KG/M2 | OXYGEN SATURATION: 99 % | HEART RATE: 64 BPM | TEMPERATURE: 97.8 F | WEIGHT: 131.6 LBS | HEIGHT: 62 IN | DIASTOLIC BLOOD PRESSURE: 76 MMHG

## 2025-05-05 DIAGNOSIS — J44.89 ASTHMA-COPD OVERLAP SYNDROME (HCC): Primary | ICD-10-CM

## 2025-05-05 DIAGNOSIS — Z72.0 TOBACCO ABUSE: Chronic | ICD-10-CM

## 2025-05-05 PROCEDURE — 1123F ACP DISCUSS/DSCN MKR DOCD: CPT | Performed by: INTERNAL MEDICINE

## 2025-05-05 PROCEDURE — 1159F MED LIST DOCD IN RCRD: CPT | Performed by: INTERNAL MEDICINE

## 2025-05-05 PROCEDURE — 1160F RVW MEDS BY RX/DR IN RCRD: CPT | Performed by: INTERNAL MEDICINE

## 2025-05-05 PROCEDURE — 99214 OFFICE O/P EST MOD 30 MIN: CPT | Performed by: INTERNAL MEDICINE

## 2025-05-05 RX ORDER — TIOTROPIUM BROMIDE 18 UG/1
18 CAPSULE ORAL; RESPIRATORY (INHALATION) DAILY
Qty: 90 CAPSULE | Refills: 1 | Status: SHIPPED | OUTPATIENT
Start: 2025-05-05 | End: 2025-05-05

## 2025-05-05 RX ORDER — FLUTICASONE PROPIONATE AND SALMETEROL 500; 50 UG/1; UG/1
1 POWDER RESPIRATORY (INHALATION) 2 TIMES DAILY
Qty: 1 EACH | Refills: 11 | Status: SHIPPED | OUTPATIENT
Start: 2025-05-05 | End: 2025-05-05

## 2025-05-05 RX ORDER — AZITHROMYCIN 250 MG/1
250 TABLET, FILM COATED ORAL DAILY
Qty: 30 TABLET | Refills: 11 | Status: SHIPPED | OUTPATIENT
Start: 2025-05-05 | End: 2026-05-05

## 2025-05-05 RX ORDER — VARENICLINE TARTRATE 0.5 MG/1
.5-1 TABLET, FILM COATED ORAL SEE ADMIN INSTRUCTIONS
Qty: 57 TABLET | Refills: 0 | Status: SHIPPED | OUTPATIENT
Start: 2025-05-05

## 2025-05-05 ASSESSMENT — ENCOUNTER SYMPTOMS
TROUBLE SWALLOWING: 0
SHORTNESS OF BREATH: 1
VOICE CHANGE: 0
HEMOPTYSIS: 0
CHEST TIGHTNESS: 0
COUGH: 1
FREQUENT THROAT CLEARING: 1
DIFFICULTY BREATHING: 1
SPUTUM PRODUCTION: 1
WHEEZING: 0
HEARTBURN: 0

## 2025-05-05 ASSESSMENT — COPD QUESTIONNAIRES: COPD: 1

## 2025-05-05 NOTE — PROGRESS NOTES
[facility]    Patient: HALEY CRUM, 74 y.o.   : 1950  2025         Subjective     Chief Complaint   Patient presents with    Follow-up     4mo Asthma f/u for med check.  Having SOB in the mornings and having productive cough with some yellowish phlegm.  Is accompanied by her daughter.  Daughter feels she may not be using her inhaler correctly.  Notes the injections are not helping.        COPD  She complains of cough, difficulty breathing, frequent throat clearing, shortness of breath and sputum production (usually clear but in the morning yellowish). There is no chest tightness, hemoptysis or wheezing. This is a chronic problem. The current episode started more than 1 year ago. The problem has been gradually worsening (no improvement, was better on trelegy inhaler, worse on symbicort, using rescue inhaler regularly). The cough is productive of sputum (worse in the morning). Associated symptoms include dyspnea on exertion. Pertinent negatives include no chest pain, fever, heartburn, sweats, trouble swallowing or weight loss. Her symptoms are aggravated by lying down, exposure to smoke and change in weather. Her symptoms are alleviated by ipratropium, beta-agonist and steroid inhaler. She reports minimal (was helpful with trelegy) improvement on treatment. Ineffective treatments: PPI didnt help cough at all. Risk factors for lung disease include smoking/tobacco exposure. Her past medical history is significant for asthma, COPD and emphysema.   Nicotine Dependence  Presents for follow-up visit. The symptoms have been stable. She smokes < 1/2 a pack (1/2 ppd. was down to 5 cig a day, back up to 1/2 ppd. not interested in quitting right now) of cigarettes per day. Compliance with prior treatments has been poor.       Patient has not been admitted or treated for pneumonia, pulmonary embolism, or respiratory failure.  Patient has not been intubated for reasons other than planned

## 2025-05-07 ENCOUNTER — CLINICAL SUPPORT (OUTPATIENT)
Dept: PULMONOLOGY | Age: 75
End: 2025-05-07

## 2025-05-07 VITALS
TEMPERATURE: 97.9 F | OXYGEN SATURATION: 98 % | DIASTOLIC BLOOD PRESSURE: 72 MMHG | SYSTOLIC BLOOD PRESSURE: 128 MMHG | WEIGHT: 131 LBS | HEIGHT: 62 IN | HEART RATE: 63 BPM | BODY MASS INDEX: 24.11 KG/M2

## 2025-05-07 DIAGNOSIS — J45.50 SEVERE PERSISTENT ASTHMA WITHOUT COMPLICATION (HCC): ICD-10-CM

## 2025-05-07 DIAGNOSIS — J44.9 CHRONIC OBSTRUCTIVE PULMONARY DISEASE, UNSPECIFIED COPD TYPE (HCC): ICD-10-CM

## 2025-05-07 DIAGNOSIS — J44.89 ASTHMA-COPD OVERLAP SYNDROME (HCC): Primary | ICD-10-CM

## 2025-05-07 NOTE — PROGRESS NOTES
ADMINISTERED Dupixent 300mg into the left upper arm. Patient observed for 30 minutes.  Patient tolerated well without any adverse reaction.        NDC # : 9139-5304-38    Lot #: [id number]     EXP: 12/31/2026      Medication was supplied by patient:  YES/NO: yes        Medication was shipped to patient  YES/NO: yes   Medication was shipped to the office YES/NO: no     Medication was supplied as a sample:  YES/NO: No                        Patient has tolerated injection well.  No signs and symptoms of reaction noted in the office.    Patient was instructed to report any problems questions or concerns to the office immediately.         Patient understands adverse reactions of medication.

## 2025-05-08 ENCOUNTER — TELEPHONE (OUTPATIENT)
Dept: PULMONOLOGY | Age: 75
End: 2025-05-08

## 2025-05-08 NOTE — TELEPHONE ENCOUNTER
When patient was given her injection yesterday, she has mentioned that someone told her she is going to need a new PA soon on her Dupixent. PA is set to  on 25. Insurance will not let me reauth this medication until the first of .

## 2025-05-21 ENCOUNTER — CLINICAL SUPPORT (OUTPATIENT)
Dept: PULMONOLOGY | Age: 75
End: 2025-05-21
Payer: MEDICARE

## 2025-05-21 VITALS
HEIGHT: 62 IN | SYSTOLIC BLOOD PRESSURE: 116 MMHG | HEART RATE: 70 BPM | OXYGEN SATURATION: 95 % | BODY MASS INDEX: 24.51 KG/M2 | TEMPERATURE: 97.6 F | DIASTOLIC BLOOD PRESSURE: 60 MMHG | WEIGHT: 133.2 LBS

## 2025-05-21 DIAGNOSIS — J44.89 OBSTRUCTIVE CHRONIC BRONCHITIS WITHOUT EXACERBATION (HCC): Primary | ICD-10-CM

## 2025-05-21 DIAGNOSIS — J45.20 MILD INTERMITTENT ASTHMA, UNSPECIFIED WHETHER COMPLICATED: ICD-10-CM

## 2025-05-21 PROCEDURE — 96372 THER/PROPH/DIAG INJ SC/IM: CPT | Performed by: INTERNAL MEDICINE

## 2025-05-21 NOTE — PROGRESS NOTES
ADMINISTERED Dupixent 300mg (MEDICATION NAME AND DOSE) SUBCUTANEOUSLY  into the Left upper ARM (upper arm, thigh, or abdomen). Patient observed for 30 minutes.  Patient tolerated well without any adverse reaction.        NDC # : 4461-1779-15    Lot #: [id number]     EXP: 12.31.2026      Medication was supplied by patient:  YES/NO: yes        Medication was shipped to patient  YES/NO: yes   Medication was shipped to the office YES/NO: no     Medication was supplied as a sample:  YES/NO: No                        Patient has tolerated injection well.  No signs and symptoms of reaction noted in the office.    Patient was instructed to report any problems questions or concerns to the office immediately.         Patient understands adverse reactions of medication.

## 2025-05-22 ENCOUNTER — APPOINTMENT (OUTPATIENT)
Dept: GENERAL RADIOLOGY | Age: 75
End: 2025-05-22
Payer: MEDICARE

## 2025-05-22 ENCOUNTER — APPOINTMENT (OUTPATIENT)
Dept: CT IMAGING | Age: 75
End: 2025-05-22
Payer: MEDICARE

## 2025-05-22 ENCOUNTER — HOSPITAL ENCOUNTER (EMERGENCY)
Age: 75
Discharge: HOME | End: 2025-05-22
Payer: MEDICARE

## 2025-05-22 VITALS
HEART RATE: 66 BPM | TEMPERATURE: 98.4 F | SYSTOLIC BLOOD PRESSURE: 131 MMHG | RESPIRATION RATE: 14 BRPM | DIASTOLIC BLOOD PRESSURE: 87 MMHG | OXYGEN SATURATION: 99 %

## 2025-05-22 DIAGNOSIS — R29.6 MULTIPLE FALLS: Primary | ICD-10-CM

## 2025-05-22 DIAGNOSIS — S02.2XXA CLOSED FRACTURE OF NASAL BONE, INITIAL ENCOUNTER: ICD-10-CM

## 2025-05-22 DIAGNOSIS — S70.02XA CONTUSION OF LEFT HIP, INITIAL ENCOUNTER: ICD-10-CM

## 2025-05-22 PROCEDURE — 70486 CT MAXILLOFACIAL W/O DYE: CPT

## 2025-05-22 PROCEDURE — 72192 CT PELVIS W/O DYE: CPT

## 2025-05-22 PROCEDURE — 99284 EMERGENCY DEPT VISIT MOD MDM: CPT

## 2025-05-22 PROCEDURE — 99214 OFFICE O/P EST MOD 30 MIN: CPT

## 2025-05-22 PROCEDURE — 73552 X-RAY EXAM OF FEMUR 2/>: CPT

## 2025-05-22 PROCEDURE — 70160 X-RAY EXAM OF NASAL BONES: CPT

## 2025-05-22 PROCEDURE — 73564 X-RAY EXAM KNEE 4 OR MORE: CPT

## 2025-05-22 PROCEDURE — 99215 OFFICE O/P EST HI 40 MIN: CPT

## 2025-05-22 PROCEDURE — 73590 X-RAY EXAM OF LOWER LEG: CPT

## 2025-05-22 PROCEDURE — 72125 CT NECK SPINE W/O DYE: CPT

## 2025-05-22 PROCEDURE — 70450 CT HEAD/BRAIN W/O DYE: CPT

## 2025-05-22 PROCEDURE — 6370000000 HC RX 637 (ALT 250 FOR IP): Performed by: NURSE PRACTITIONER

## 2025-05-22 RX ORDER — HYDROCODONE BITARTRATE AND ACETAMINOPHEN 5; 325 MG/1; MG/1
1 TABLET ORAL ONCE
Status: COMPLETED | OUTPATIENT
Start: 2025-05-22 | End: 2025-05-22

## 2025-05-22 RX ADMIN — HYDROCODONE BITARTRATE AND ACETAMINOPHEN 1 TABLET: 5; 325 TABLET ORAL at 14:03

## 2025-05-22 ASSESSMENT — PAIN DESCRIPTION - LOCATION
LOCATION: LEG
LOCATION: LEG

## 2025-05-22 ASSESSMENT — PAIN SCALES - GENERAL
PAINLEVEL_OUTOF10: 5
PAINLEVEL_OUTOF10: 6

## 2025-05-22 ASSESSMENT — PAIN DESCRIPTION - ORIENTATION
ORIENTATION: LEFT
ORIENTATION: LEFT

## 2025-05-22 ASSESSMENT — PAIN - FUNCTIONAL ASSESSMENT
PAIN_FUNCTIONAL_ASSESSMENT: 0-10
PAIN_FUNCTIONAL_ASSESSMENT: 0-10

## 2025-05-22 NOTE — ED NOTES
Report called to [facility] . Pt traveling by private car.     Nora Escalona, RN  05/22/25 [id number]

## 2025-05-22 NOTE — ED NOTES
Pt to [facility] with c/o left knee pain that started yesterday after fallin onto a carpeted floor. Pt reports she also hit her nose and has small abrasions on her four arms as well. Pt denies any LOC. She reports having more pain when standing and hitting.      Nora Escalona, JULIAN  05/22/25 4661

## 2025-05-22 NOTE — ED NOTES
Pt presents to the ED from  with complaints of falling a few days ago. Pt is having some pain in her left leg. Rates pain a 5/10. States she cannot bear any weight on her left leg.

## 2025-05-22 NOTE — ED PROVIDER NOTES
[facility] EMERGENCY DEPARTMENT      EMERGENCY MEDICINE     Pt Name: Milli Rice  MRN: [id number]  Birthdate 1950  Date of evaluation: 5/22/2025  Provider: FLORIN Dorsey CNP    CHIEF COMPLAINT       Chief Complaint   Patient presents with    Knee Pain     left     HISTORY OF PRESENT ILLNESS   Milli Rice is a pleasant 74 y.o. female with a past medical history of asthma, COPD, tobacco abuse.  Patient presents to ED after mechanical fall from tripping over her own feet in her home.  This occurred a few days ago. Denies LOC. She did not go to urgent care until today due to pain of L leg and was sent to ED. Patient states she fell forward hitting nose.   helped patient get up off the floor.   states that she has had 5-6 falls within the past year with several fractures. States she uses a cane or a walker at home to ambulate however sometimes she forgets to use it. Is reporting pain in left hip currently that has not went away with ice heat and Tylenol.  Reports trouble with gait and transitioning to furniture.    Urgent care workup: x-ray was performed in urgent care of left femur fibula and knee.  No fractures throughout . Small left knee joint effusion present.  X-ray of nasal bone shows positive nasal fracture.  PASTMEDICAL HISTORY     Past Medical History:   Diagnosis Date    Closed fracture of right shoulder 2020    Intertrochanteric fracture of left femur, closed, initial encounter ([facility]) 03/03/2022       Patient Active Problem List   Diagnosis Code    Senile osteoporosis M81.0    Adhesive capsulitis of right shoulder M75.01    3-part fracture of surgical neck of right humerus with routine healing S42.231D    Degeneration of intervertebral disc of cervical region M50.30    Displacement of cervical intervertebral disc without myelopathy M50.20    Fracture of greater tuberosity of right humerus with routine healing S42.251D    Spinal stenosis in cervical region M48.02     Spondylolisthesis, cervical region M43.12    Asthma-COPD overlap syndrome (HCC) J44.89    Tobacco abuse Z72.0     SURGICAL HISTORY       Past Surgical History:   Procedure Laterality Date    HIP FRACTURE SURGERY Left 2022    LEFT FEMUR ORIF INTERTAN performed by Boris Ashford MD at [facility] OR    HYSTERECTOMY (CERVIX STATUS UNKNOWN)      LUMBAR SPINE SURGERY  2019    OVARY REMOVAL         CURRENT MEDICATIONS       Previous Medications    AZITHROMYCIN (ZITHROMAX) 250 MG TABLET    Take 1 tablet by mouth daily    DUPIXENT 300 MG/2ML SOAJ INJECTION    2 mLs every 14 days    EPINEPHRINE (EPIPEN) 0.3 MG/0.3ML SOAJ INJECTION    Inject 0.3 mLs into the skin once for 1 dose Please dispense 1 Epipen 0.3mg SC/IM X1 for anaphylaxis    ESCITALOPRAM (LEXAPRO) 10 MG TABLET    Take 1 tablet by mouth daily    FLUTICASONE-UMECLIDIN-VILANT (TRELEGY ELLIPTA) 200-62.5-25 MCG/ACT AEPB INHALER    Inhale 1 puff into the lungs daily    IBUPROFEN (ADVIL;MOTRIN) 400 MG TABLET    Take 1 tablet by mouth every 6 hours as needed for Pain    VARENICLINE (CHANTIX) 0.5 MG TABLET    Take 1-2 tablets by mouth See Admin Instructions 0.5mg DAILY for 3 days followed by 0.5mg TWICE DAILY for 4 days followed by 1mg TWICE DAILY       ALLERGIES     is allergic to pcn [penicillins].    FAMILY HISTORY     She indicated that her mother is . She indicated that her father is . She indicated that the status of her sister is unknown. She indicated that the status of her maternal grandmother is unknown.       SOCIAL HISTORY       Social History     Tobacco Use    Smoking status: Every Day     Current packs/day: 1.00     Average packs/day: 1 pack/day for 62.4 years (62.4 ttl pk-yrs)     Types: Cigarettes     Start date: 1963    Smokeless tobacco: Never    Tobacco comments:     1 ppd 2025     4cig a day 25   Vaping Use    Vaping status: Never Used   Substance Use Topics    Alcohol use: Yes     Comment: Drinking 1 wine cooler about

## 2025-05-22 NOTE — ED PROVIDER NOTES
[facility] EMERGENCY DEPARTMENT  Urgent Care Encounter      CHIEF COMPLAINT       Chief Complaint   Patient presents with    Knee Pain     left       Nurses Notes reviewed and I agree except as noted in the HPI.  HISTORY OF PRESENT ILLNESS   Milli Rice is a 74 y.o. female who presents to urgent care with complaints of left knee pain.  Patient reports symptoms started yesterday after she fell onto the carpet floor.  Patient also reports hitting her head, right arm.  Patient has been at bedside reports she has fallen 4 times in the last 2 days.  Patient denies loss of consciousness.  Denies being on blood thinners.  Patient is unable to bear weight whatsoever on left leg.  Patient does have history of femur fracture with hardware.    REVIEW OF SYSTEMS     Review of Systems   HENT:  Positive for nosebleeds.    Musculoskeletal:  Positive for gait problem, joint swelling and myalgias.   Neurological:  Positive for weakness. Negative for dizziness, syncope and facial asymmetry.       PAST MEDICAL HISTORY         Diagnosis Date    Closed fracture of right shoulder 2020    Intertrochanteric fracture of left femur, closed, initial encounter ([facility]) 03/03/2022       SURGICAL HISTORY     Patient  has a past surgical history that includes Hysterectomy; Hip fracture surgery (Left, 03/03/2022); Lumbar spine surgery (2019); and Ovary removal.    CURRENT MEDICATIONS       Previous Medications    AZITHROMYCIN (ZITHROMAX) 250 MG TABLET    Take 1 tablet by mouth daily    DUPIXENT 300 MG/2ML SOAJ INJECTION    2 mLs every 14 days    EPINEPHRINE (EPIPEN) 0.3 MG/0.3ML SOAJ INJECTION    Inject 0.3 mLs into the skin once for 1 dose Please dispense 1 Epipen 0.3mg SC/IM X1 for anaphylaxis    ESCITALOPRAM (LEXAPRO) 10 MG TABLET    Take 1 tablet by mouth daily    FLUTICASONE-UMECLIDIN-VILANT (TRELEGY ELLIPTA) 200-62.5-25 MCG/ACT AEPB INHALER    Inhale 1 puff into the lungs daily    IBUPROFEN (ADVIL;MOTRIN) 400 MG TABLET    Take 1

## 2025-05-27 DIAGNOSIS — Z72.0 TOBACCO ABUSE: Chronic | ICD-10-CM

## 2025-05-27 NOTE — TELEPHONE ENCOUNTER
Received refill request for Chantix.   Medication was last ordered by Hernandez.   Medication was last ordered on 05/05/2025 with 0 refills.     Patient was last seen in the office 05/05/2025.   Does patient have a scheduled follow up?: yes - 06/26/2025    Medication needs to be sent to [facility] on [address].     Thank you, please advise!    Patient's Allergies:  Allergies   Allergen Reactions    Pcn [Penicillins] Shortness Of Breath

## 2025-05-28 RX ORDER — VARENICLINE TARTRATE 0.5 MG/1
TABLET, FILM COATED ORAL
Qty: 57 TABLET | Refills: 0 | Status: SHIPPED | OUTPATIENT
Start: 2025-05-28

## 2025-06-04 ENCOUNTER — CLINICAL SUPPORT (OUTPATIENT)
Dept: PULMONOLOGY | Age: 75
End: 2025-06-04

## 2025-06-04 VITALS
DIASTOLIC BLOOD PRESSURE: 60 MMHG | HEART RATE: 60 BPM | HEIGHT: 63 IN | SYSTOLIC BLOOD PRESSURE: 126 MMHG | OXYGEN SATURATION: 98 % | BODY MASS INDEX: 23.32 KG/M2 | WEIGHT: 131.6 LBS | TEMPERATURE: 98.1 F

## 2025-06-04 DIAGNOSIS — J44.89 ASTHMA-COPD OVERLAP SYNDROME (HCC): Primary | ICD-10-CM

## 2025-06-04 RX ORDER — TRAMADOL HYDROCHLORIDE 50 MG/1
TABLET ORAL
Status: ON HOLD | COMMUNITY
Start: 2025-06-03

## 2025-06-04 NOTE — PROGRESS NOTES
Administered Dupixent subcutaneously in left upper arm.  -  Dosage : 300 mg  NDC: 0360-9712-91   Lot # : [id number]   Expiration Date : 12/31/2026  -  Medication shipped to patient   Patient Supplied? : Yes  Medication form: auto-pen injector  Pharmacy : [facility]  Pulmonary Provider : Dr. Ng  -  Patient advised to remain in clinic 15 minutes post-injection to monitor for adverse reactions.  Patient instructed to alert staff if any adverse reactions arise.  -  Next injection to be in 2 weeks  -  Electronically signed by Clare Del Rosario LPN on 6/4/2025 at 1:01 PM

## 2025-06-05 ENCOUNTER — APPOINTMENT (OUTPATIENT)
Dept: GENERAL RADIOLOGY | Age: 75
DRG: 543 | End: 2025-06-05
Payer: MEDICARE

## 2025-06-05 ENCOUNTER — HOSPITAL ENCOUNTER (INPATIENT)
Age: 75
LOS: 7 days | Discharge: NURSING, REHAB OR LONG-TERM CARE | DRG: 543 | End: 2025-06-12
Attending: PHYSICIAN ASSISTANT | Admitting: ORTHOPAEDIC SURGERY
Payer: MEDICARE

## 2025-06-05 ENCOUNTER — APPOINTMENT (OUTPATIENT)
Dept: CT IMAGING | Age: 75
DRG: 543 | End: 2025-06-05
Payer: MEDICARE

## 2025-06-05 DIAGNOSIS — S82.142A CLOSED FRACTURE OF LEFT TIBIAL PLATEAU, INITIAL ENCOUNTER: Primary | ICD-10-CM

## 2025-06-05 DIAGNOSIS — R01.1 HEART MURMUR: ICD-10-CM

## 2025-06-05 DIAGNOSIS — R94.31 ABNORMAL EKG: ICD-10-CM

## 2025-06-05 DIAGNOSIS — R06.02 SHORTNESS OF BREATH: ICD-10-CM

## 2025-06-05 PROBLEM — S82.132A CLOSED FRACTURE OF MEDIAL PORTION OF LEFT TIBIAL PLATEAU, INITIAL ENCOUNTER: Status: ACTIVE | Noted: 2025-06-05

## 2025-06-05 LAB
ALBUMIN SERPL BCG-MCNC: 3.9 G/DL (ref 3.4–4.9)
ALP SERPL-CCNC: 111 U/L (ref 38–126)
ALT SERPL W/O P-5'-P-CCNC: 10 U/L (ref 10–35)
ANION GAP SERPL CALC-SCNC: 13 MEQ/L (ref 8–16)
AST SERPL-CCNC: 15 U/L (ref 10–35)
BASOPHILS ABSOLUTE: 0.1 THOU/MM3 (ref 0–0.1)
BASOPHILS NFR BLD AUTO: 0.7 %
BILIRUB CONJ SERPL-MCNC: 0.3 MG/DL (ref 0–0.2)
BILIRUB SERPL-MCNC: 0.4 MG/DL (ref 0.3–1.2)
BUN SERPL-MCNC: 15 MG/DL (ref 8–23)
CALCIUM SERPL-MCNC: 9.3 MG/DL (ref 8.8–10.2)
CHLORIDE SERPL-SCNC: 103 MEQ/L (ref 98–111)
CO2 SERPL-SCNC: 23 MEQ/L (ref 22–29)
CREAT SERPL-MCNC: 0.8 MG/DL (ref 0.5–0.9)
CRP SERPL-MCNC: < 0.3 MG/DL (ref 0–0.5)
DEPRECATED RDW RBC AUTO: 42.5 FL (ref 35–45)
EKG ATRIAL RATE: 54 BPM
EKG P AXIS: 71 DEGREES
EKG P-R INTERVAL: 146 MS
EKG Q-T INTERVAL: 452 MS
EKG QRS DURATION: 82 MS
EKG QTC CALCULATION (BAZETT): 428 MS
EKG R AXIS: 60 DEGREES
EKG T AXIS: 74 DEGREES
EKG VENTRICULAR RATE: 54 BPM
EOSINOPHIL NFR BLD AUTO: 1.2 %
EOSINOPHILS ABSOLUTE: 0.1 THOU/MM3 (ref 0–0.4)
ERYTHROCYTE [DISTWIDTH] IN BLOOD BY AUTOMATED COUNT: 13.4 % (ref 11.5–14.5)
ERYTHROCYTE [SEDIMENTATION RATE] IN BLOOD BY WESTERGREN METHOD: 21 MM/HR (ref 0–20)
GFR SERPL CREATININE-BSD FRML MDRD: 76 ML/MIN/1.73M2
GLUCOSE SERPL-MCNC: 81 MG/DL (ref 74–109)
HCT VFR BLD AUTO: 40.8 % (ref 37–47)
HGB BLD-MCNC: 13.3 GM/DL (ref 12–16)
IMM GRANULOCYTES # BLD AUTO: 0.02 THOU/MM3 (ref 0–0.07)
IMM GRANULOCYTES NFR BLD AUTO: 0.2 %
LYMPHOCYTES ABSOLUTE: 2.5 THOU/MM3 (ref 1–4.8)
LYMPHOCYTES NFR BLD AUTO: 30.3 %
MAGNESIUM SERPL-MCNC: 2.2 MG/DL (ref 1.6–2.6)
MCH RBC QN AUTO: 28.3 PG (ref 26–33)
MCHC RBC AUTO-ENTMCNC: 32.6 GM/DL (ref 32.2–35.5)
MCV RBC AUTO: 86.8 FL (ref 81–99)
MONOCYTES ABSOLUTE: 0.6 THOU/MM3 (ref 0.4–1.3)
MONOCYTES NFR BLD AUTO: 7.8 %
NEUTROPHILS ABSOLUTE: 4.8 THOU/MM3 (ref 1.8–7.7)
NEUTROPHILS NFR BLD AUTO: 59.8 %
NRBC BLD AUTO-RTO: 0 /100 WBC
OSMOLALITY SERPL CALC.SUM OF ELEC: 277.4 MOSMOL/KG (ref 275–300)
PLATELET # BLD AUTO: 266 THOU/MM3 (ref 130–400)
PMV BLD AUTO: 9 FL (ref 9.4–12.4)
POTASSIUM SERPL-SCNC: 4.8 MEQ/L (ref 3.5–5.2)
PROT SERPL-MCNC: 6.8 G/DL (ref 6.4–8.3)
RBC # BLD AUTO: 4.7 MILL/MM3 (ref 4.2–5.4)
SODIUM SERPL-SCNC: 139 MEQ/L (ref 135–145)
WBC # BLD AUTO: 8.1 THOU/MM3 (ref 4.8–10.8)

## 2025-06-05 PROCEDURE — 6370000000 HC RX 637 (ALT 250 FOR IP): Performed by: PHYSICIAN ASSISTANT

## 2025-06-05 PROCEDURE — 94640 AIRWAY INHALATION TREATMENT: CPT

## 2025-06-05 PROCEDURE — 85651 RBC SED RATE NONAUTOMATED: CPT

## 2025-06-05 PROCEDURE — 82248 BILIRUBIN DIRECT: CPT

## 2025-06-05 PROCEDURE — 6820000001 HC L2 TRAUMA SURGERY EVALUATION: Performed by: NURSE PRACTITIONER

## 2025-06-05 PROCEDURE — 93010 ELECTROCARDIOGRAM REPORT: CPT | Performed by: INTERNAL MEDICINE

## 2025-06-05 PROCEDURE — 93005 ELECTROCARDIOGRAM TRACING: CPT | Performed by: PHYSICIAN ASSISTANT

## 2025-06-05 PROCEDURE — 83735 ASSAY OF MAGNESIUM: CPT

## 2025-06-05 PROCEDURE — 99285 EMERGENCY DEPT VISIT HI MDM: CPT

## 2025-06-05 PROCEDURE — 85025 COMPLETE CBC W/AUTO DIFF WBC: CPT

## 2025-06-05 PROCEDURE — 2580000003 HC RX 258: Performed by: PHYSICIAN ASSISTANT

## 2025-06-05 PROCEDURE — 86140 C-REACTIVE PROTEIN: CPT

## 2025-06-05 PROCEDURE — 36415 COLL VENOUS BLD VENIPUNCTURE: CPT

## 2025-06-05 PROCEDURE — 73552 X-RAY EXAM OF FEMUR 2/>: CPT

## 2025-06-05 PROCEDURE — 1200000000 HC SEMI PRIVATE

## 2025-06-05 PROCEDURE — 80053 COMPREHEN METABOLIC PANEL: CPT

## 2025-06-05 PROCEDURE — 73700 CT LOWER EXTREMITY W/O DYE: CPT

## 2025-06-05 PROCEDURE — 73564 X-RAY EXAM KNEE 4 OR MORE: CPT

## 2025-06-05 RX ORDER — ESCITALOPRAM OXALATE 10 MG/1
10 TABLET ORAL NIGHTLY
Status: DISCONTINUED | OUTPATIENT
Start: 2025-06-05 | End: 2025-06-12 | Stop reason: HOSPADM

## 2025-06-05 RX ORDER — BUDESONIDE AND FORMOTEROL FUMARATE DIHYDRATE 160; 4.5 UG/1; UG/1
2 AEROSOL RESPIRATORY (INHALATION)
Status: DISCONTINUED | OUTPATIENT
Start: 2025-06-05 | End: 2025-06-06

## 2025-06-05 RX ORDER — PANTOPRAZOLE SODIUM 40 MG/1
40 TABLET, DELAYED RELEASE ORAL
Status: DISCONTINUED | OUTPATIENT
Start: 2025-06-06 | End: 2025-06-12 | Stop reason: HOSPADM

## 2025-06-05 RX ORDER — SENNOSIDES 8.6 MG/1
1 TABLET ORAL NIGHTLY
Status: DISCONTINUED | OUTPATIENT
Start: 2025-06-05 | End: 2025-06-12 | Stop reason: HOSPADM

## 2025-06-05 RX ORDER — SODIUM CHLORIDE 9 MG/ML
INJECTION, SOLUTION INTRAVENOUS CONTINUOUS
Status: DISCONTINUED | OUTPATIENT
Start: 2025-06-05 | End: 2025-06-08

## 2025-06-05 RX ORDER — MAGNESIUM SULFATE IN WATER 40 MG/ML
2000 INJECTION, SOLUTION INTRAVENOUS PRN
Status: DISCONTINUED | OUTPATIENT
Start: 2025-06-05 | End: 2025-06-12 | Stop reason: HOSPADM

## 2025-06-05 RX ORDER — DOCUSATE SODIUM 100 MG/1
100 CAPSULE, LIQUID FILLED ORAL 2 TIMES DAILY
Status: DISCONTINUED | OUTPATIENT
Start: 2025-06-05 | End: 2025-06-12 | Stop reason: HOSPADM

## 2025-06-05 RX ORDER — ONDANSETRON 4 MG/1
4 TABLET, ORALLY DISINTEGRATING ORAL EVERY 8 HOURS PRN
Status: DISCONTINUED | OUTPATIENT
Start: 2025-06-05 | End: 2025-06-12 | Stop reason: HOSPADM

## 2025-06-05 RX ORDER — CYCLOBENZAPRINE HCL 10 MG
10 TABLET ORAL 3 TIMES DAILY PRN
Status: DISCONTINUED | OUTPATIENT
Start: 2025-06-05 | End: 2025-06-06

## 2025-06-05 RX ORDER — ONDANSETRON 2 MG/ML
4 INJECTION INTRAMUSCULAR; INTRAVENOUS EVERY 6 HOURS PRN
Status: DISCONTINUED | OUTPATIENT
Start: 2025-06-05 | End: 2025-06-12 | Stop reason: HOSPADM

## 2025-06-05 RX ORDER — POTASSIUM CHLORIDE 7.45 MG/ML
10 INJECTION INTRAVENOUS PRN
Status: DISCONTINUED | OUTPATIENT
Start: 2025-06-05 | End: 2025-06-12 | Stop reason: HOSPADM

## 2025-06-05 RX ORDER — MORPHINE SULFATE 2 MG/ML
2 INJECTION, SOLUTION INTRAMUSCULAR; INTRAVENOUS
Refills: 0 | Status: DISCONTINUED | OUTPATIENT
Start: 2025-06-05 | End: 2025-06-06

## 2025-06-05 RX ORDER — ACETAMINOPHEN 325 MG/1
650 TABLET ORAL EVERY 6 HOURS
Status: DISCONTINUED | OUTPATIENT
Start: 2025-06-05 | End: 2025-06-08

## 2025-06-05 RX ORDER — HYDROCODONE BITARTRATE AND ACETAMINOPHEN 5; 325 MG/1; MG/1
2 TABLET ORAL EVERY 4 HOURS PRN
Status: DISCONTINUED | OUTPATIENT
Start: 2025-06-05 | End: 2025-06-12 | Stop reason: HOSPADM

## 2025-06-05 RX ORDER — ENOXAPARIN SODIUM 100 MG/ML
40 INJECTION SUBCUTANEOUS EVERY 24 HOURS
Status: DISCONTINUED | OUTPATIENT
Start: 2025-06-06 | End: 2025-06-12 | Stop reason: HOSPADM

## 2025-06-05 RX ORDER — OXYCODONE HYDROCHLORIDE 5 MG/1
5 TABLET ORAL EVERY 4 HOURS PRN
Refills: 0 | Status: DISCONTINUED | OUTPATIENT
Start: 2025-06-05 | End: 2025-06-05

## 2025-06-05 RX ORDER — HYDROCODONE BITARTRATE AND ACETAMINOPHEN 5; 325 MG/1; MG/1
1 TABLET ORAL EVERY 4 HOURS PRN
Status: DISCONTINUED | OUTPATIENT
Start: 2025-06-05 | End: 2025-06-12 | Stop reason: HOSPADM

## 2025-06-05 RX ORDER — HYDROCODONE BITARTRATE AND ACETAMINOPHEN 5; 325 MG/1; MG/1
1 TABLET ORAL ONCE
Refills: 0 | Status: COMPLETED | OUTPATIENT
Start: 2025-06-05 | End: 2025-06-05

## 2025-06-05 RX ORDER — POTASSIUM CHLORIDE 1500 MG/1
40 TABLET, EXTENDED RELEASE ORAL PRN
Status: DISCONTINUED | OUTPATIENT
Start: 2025-06-05 | End: 2025-06-12 | Stop reason: HOSPADM

## 2025-06-05 RX ORDER — MORPHINE SULFATE 4 MG/ML
4 INJECTION, SOLUTION INTRAMUSCULAR; INTRAVENOUS
Refills: 0 | Status: DISCONTINUED | OUTPATIENT
Start: 2025-06-05 | End: 2025-06-06

## 2025-06-05 RX ORDER — OXYCODONE HYDROCHLORIDE 5 MG/1
10 TABLET ORAL EVERY 4 HOURS PRN
Refills: 0 | Status: DISCONTINUED | OUTPATIENT
Start: 2025-06-05 | End: 2025-06-05

## 2025-06-05 RX ADMIN — DOCUSATE SODIUM 100 MG: 100 CAPSULE, LIQUID FILLED ORAL at 17:23

## 2025-06-05 RX ADMIN — SENNOSIDES 8.6 MG: 8.6 TABLET, FILM COATED ORAL at 20:19

## 2025-06-05 RX ADMIN — DOCUSATE SODIUM 100 MG: 100 CAPSULE, LIQUID FILLED ORAL at 20:19

## 2025-06-05 RX ADMIN — SODIUM CHLORIDE: 0.9 INJECTION, SOLUTION INTRAVENOUS at 17:15

## 2025-06-05 RX ADMIN — ESCITALOPRAM OXALATE 10 MG: 10 TABLET ORAL at 20:19

## 2025-06-05 RX ADMIN — HYDROCODONE BITARTRATE AND ACETAMINOPHEN 1 TABLET: 5; 325 TABLET ORAL at 09:44

## 2025-06-05 RX ADMIN — BUDESONIDE AND FORMOTEROL FUMARATE DIHYDRATE 2 PUFF: 160; 4.5 AEROSOL RESPIRATORY (INHALATION) at 20:27

## 2025-06-05 RX ADMIN — HYDROCODONE BITARTRATE AND ACETAMINOPHEN 1 TABLET: 5; 325 TABLET ORAL at 15:47

## 2025-06-05 RX ADMIN — HYDROCODONE BITARTRATE AND ACETAMINOPHEN 1 TABLET: 5; 325 TABLET ORAL at 20:19

## 2025-06-05 ASSESSMENT — PAIN DESCRIPTION - ORIENTATION
ORIENTATION: LEFT
ORIENTATION: LEFT

## 2025-06-05 ASSESSMENT — PAIN SCALES - GENERAL
PAINLEVEL_OUTOF10: 7
PAINLEVEL_OUTOF10: 6
PAINLEVEL_OUTOF10: 6
PAINLEVEL_OUTOF10: 4
PAINLEVEL_OUTOF10: 3
PAINLEVEL_OUTOF10: 8
PAINLEVEL_OUTOF10: 3

## 2025-06-05 ASSESSMENT — PAIN - FUNCTIONAL ASSESSMENT
PAIN_FUNCTIONAL_ASSESSMENT: PREVENTS OR INTERFERES SOME ACTIVE ACTIVITIES AND ADLS
PAIN_FUNCTIONAL_ASSESSMENT: 0-10
PAIN_FUNCTIONAL_ASSESSMENT: 0-10
PAIN_FUNCTIONAL_ASSESSMENT: PREVENTS OR INTERFERES SOME ACTIVE ACTIVITIES AND ADLS

## 2025-06-05 ASSESSMENT — PAIN DESCRIPTION - PAIN TYPE: TYPE: ACUTE PAIN

## 2025-06-05 ASSESSMENT — PAIN DESCRIPTION - DESCRIPTORS
DESCRIPTORS: ACHING
DESCRIPTORS: SHARP

## 2025-06-05 ASSESSMENT — PAIN DESCRIPTION - LOCATION
LOCATION: LEG
LOCATION: KNEE

## 2025-06-05 ASSESSMENT — PAIN DESCRIPTION - FREQUENCY: FREQUENCY: CONTINUOUS

## 2025-06-05 NOTE — ED PROVIDER NOTES
blank)  Labs Reviewed   CBC WITH AUTO DIFFERENTIAL - Abnormal; Notable for the following components:       Result Value    MPV 9.0 (*)     All other components within normal limits   HEPATIC FUNCTION PANEL - Abnormal; Notable for the following components:    Bilirubin, Direct 0.3 (*)     All other components within normal limits   SEDIMENTATION RATE - Abnormal; Notable for the following components:    Sed Rate, Automated 21 (*)     All other components within normal limits   BASIC METABOLIC PANEL   MAGNESIUM   ANION GAP   OSMOLALITY   GLOMERULAR FILTRATION RATE, ESTIMATED   C-REACTIVE PROTEIN   CBC WITH AUTO DIFFERENTIAL   BASIC METABOLIC PANEL W/ REFLEX TO MG FOR LOW K           (Any cultures that may have been sent were not resulted at the time of this patient visit)    MEDICAL DECISION MAKING / ED COURSE:     1) Number and Complexity of Problems            Problem List This Visit:         Chief Complaint   Patient presents with    Fall    Knee Pain            Differential Diagnosis includes (but not limited to):  1.  Ligament injury of knee  2.  Fracture of knee        Diagnoses Considered but I have low suspicion of:   1.  DVT             Pertinent Comorbid Conditions:    Recent fall on 5/20    2)  Data Reviewed (none if left blank)          My Independent interpretations:     EKG:      Sinus bradycardia    Imaging:   X-ray left knee: No acute bony abnormality.  CTs knee: Nondisplaced comminuted fracture of the intercondylar region of the tibia extending into the medial tibial plateau.  X-ray femur: No fracture or dislocation.    Labs:      Reviewed by myself and attending.  See above                 Decision Rules/Clinical Scores utilized:  None            External Documentation Reviewed:         Previous patient encounter documents & history available on EMR was reviewed yes             See Formal Diagnostic Results above for the lab and radiology tests and orders.    3)  Treatment and Disposition         ED

## 2025-06-05 NOTE — ED NOTES
Pt resting on cot; medicated per order. Spoke w/Birdie; daughter by phone who reports no MRI order scheduled at this time. Pt has appointment w/darius. CHAYO Bourgeois updated. Pt remains A&O to person, place and situation. Breathing easy and unlabored on RA. No visible signs of distress noted at this time. Vitals updated. Call light and spouse at bedside and within reach.

## 2025-06-05 NOTE — PROCEDURES
PROCEDURE NOTE  Date: 6/5/2025   Name: Milli Rice  YOB: 1950    Procedures        EKG was completed and handed to RN

## 2025-06-05 NOTE — H&P
Orthopaedic H&P  Patient:  Milli Rice  YOB: 1950  MRN: [id number]     Acct: [id number]    PCP: Garret Milner MD  Date of Admission: 6/5/2025  Date of Service: Pt seen/examined on 6/5/2025     Chief Complaint: L knee pain  History Of Present Illness: 74 y.o. female who presents with L knee pain  Sustained a fall on 5/20/25 where two days later sought care and imaging was negative for acute process. She has had continued difficulty with ambulation and mobilization, new onset pain last evening after a fall, did not hit head, continued L knee pain and effusion, feelings of instability. Pain is to the global L knee, worsened with palpation ROM and weight bearing, relieved by immobilization. No other msk concerns. No paresthesias or weakness otherwise      Underwent L femur IMN Dr Ashford 2022, removal of proximal hardware 2023, no post operative complications    Baseline walker dependent   No chest pain son nausea vomiting      Past Medical History:        Diagnosis Date    Asthma     Closed fracture of right shoulder 2020    Intertrochanteric fracture of left femur, closed, initial encounter ([facility]) 03/03/2022       Past Surgical History:        Procedure Laterality Date    HIP FRACTURE SURGERY Left 03/03/2022    LEFT FEMUR ORIF INTERTAN performed by Boris [facility]ford MD at [facility] OR    HYSTERECTOMY (CERVIX STATUS UNKNOWN)      LUMBAR SPINE SURGERY  2019    OVARY REMOVAL         Home Medications:   Prior to Admission medications    Medication Sig Start Date End Date Taking? Authorizing Provider   traMADol (ULTRAM) 50 MG tablet  6/3/25   Provider, MD Klaus   varenicline (CHANTIX) 0.5 MG tablet USE AS DIRECTED 5/28/25   Brando Ng MD   fluticasone-umeclidin-vilant (TRELEGY ELLIPTA) 200-62.5-25 MCG/ACT AEPB inhaler Inhale 1 puff into the lungs daily 5/5/25 5/5/26  Brando Ng MD   azithromycin (ZITHROMAX) 250 MG tablet Take 1 tablet by mouth daily 5/5/25 5/5/26  Brando Ng MD   [facility]

## 2025-06-05 NOTE — ED NOTES
Pt to ED via [facility] for c/o frequent falls and L leg pain. Pt is scheduled for an MRI on Tuesday for her leg pain but pt states her pain was too great and she decided to come in to be seen. Pt states last fall was 2 days ago. Denies any LOC, hitting her head or any other symptoms at this time. VSS, denies any current needs.

## 2025-06-06 LAB
ANION GAP SERPL CALC-SCNC: 12 MEQ/L (ref 8–16)
BASOPHILS ABSOLUTE: 0 THOU/MM3 (ref 0–0.1)
BASOPHILS NFR BLD AUTO: 0.7 %
BUN SERPL-MCNC: 12 MG/DL (ref 8–23)
CALCIUM SERPL-MCNC: 8.8 MG/DL (ref 8.8–10.2)
CHLORIDE SERPL-SCNC: 108 MEQ/L (ref 98–111)
CO2 SERPL-SCNC: 18 MEQ/L (ref 22–29)
CREAT SERPL-MCNC: 0.7 MG/DL (ref 0.5–0.9)
DEPRECATED RDW RBC AUTO: 42.8 FL (ref 35–45)
EOSINOPHIL NFR BLD AUTO: 2.2 %
EOSINOPHILS ABSOLUTE: 0.1 THOU/MM3 (ref 0–0.4)
ERYTHROCYTE [DISTWIDTH] IN BLOOD BY AUTOMATED COUNT: 13.7 % (ref 11.5–14.5)
GFR SERPL CREATININE-BSD FRML MDRD: 90 ML/MIN/1.73M2
GLUCOSE SERPL-MCNC: 79 MG/DL (ref 74–109)
HCT VFR BLD AUTO: 37.2 % (ref 37–47)
HGB BLD-MCNC: 12.3 GM/DL (ref 12–16)
IMM GRANULOCYTES # BLD AUTO: 0.02 THOU/MM3 (ref 0–0.07)
IMM GRANULOCYTES NFR BLD AUTO: 0.4 %
LYMPHOCYTES ABSOLUTE: 1.7 THOU/MM3 (ref 1–4.8)
LYMPHOCYTES NFR BLD AUTO: 31.6 %
MCH RBC QN AUTO: 28.7 PG (ref 26–33)
MCHC RBC AUTO-ENTMCNC: 33.1 GM/DL (ref 32.2–35.5)
MCV RBC AUTO: 86.7 FL (ref 81–99)
MONOCYTES ABSOLUTE: 0.5 THOU/MM3 (ref 0.4–1.3)
MONOCYTES NFR BLD AUTO: 8.6 %
NEUTROPHILS ABSOLUTE: 3.1 THOU/MM3 (ref 1.8–7.7)
NEUTROPHILS NFR BLD AUTO: 56.5 %
NRBC BLD AUTO-RTO: 0 /100 WBC
PLATELET # BLD AUTO: 327 THOU/MM3 (ref 130–400)
PMV BLD AUTO: 10 FL (ref 9.4–12.4)
POTASSIUM SERPL-SCNC: 4.5 MEQ/L (ref 3.5–5.2)
RBC # BLD AUTO: 4.29 MILL/MM3 (ref 4.2–5.4)
SODIUM SERPL-SCNC: 138 MEQ/L (ref 135–145)
WBC # BLD AUTO: 5.5 THOU/MM3 (ref 4.8–10.8)

## 2025-06-06 PROCEDURE — 97530 THERAPEUTIC ACTIVITIES: CPT

## 2025-06-06 PROCEDURE — 1200000000 HC SEMI PRIVATE

## 2025-06-06 PROCEDURE — 94640 AIRWAY INHALATION TREATMENT: CPT

## 2025-06-06 PROCEDURE — 97166 OT EVAL MOD COMPLEX 45 MIN: CPT

## 2025-06-06 PROCEDURE — 97162 PT EVAL MOD COMPLEX 30 MIN: CPT

## 2025-06-06 PROCEDURE — 6360000002 HC RX W HCPCS: Performed by: PHYSICIAN ASSISTANT

## 2025-06-06 PROCEDURE — 6370000000 HC RX 637 (ALT 250 FOR IP): Performed by: PHYSICIAN ASSISTANT

## 2025-06-06 PROCEDURE — 36415 COLL VENOUS BLD VENIPUNCTURE: CPT

## 2025-06-06 PROCEDURE — 94761 N-INVAS EAR/PLS OXIMETRY MLT: CPT

## 2025-06-06 PROCEDURE — 80048 BASIC METABOLIC PNL TOTAL CA: CPT

## 2025-06-06 PROCEDURE — 2580000003 HC RX 258: Performed by: PHYSICIAN ASSISTANT

## 2025-06-06 PROCEDURE — 85025 COMPLETE CBC W/AUTO DIFF WBC: CPT

## 2025-06-06 RX ORDER — BUDESONIDE AND FORMOTEROL FUMARATE DIHYDRATE 160; 4.5 UG/1; UG/1
2 AEROSOL RESPIRATORY (INHALATION) 2 TIMES DAILY PRN
Status: DISCONTINUED | OUTPATIENT
Start: 2025-06-06 | End: 2025-06-08

## 2025-06-06 RX ORDER — CYCLOBENZAPRINE HCL 10 MG
10 TABLET ORAL NIGHTLY
Status: DISCONTINUED | OUTPATIENT
Start: 2025-06-06 | End: 2025-06-12 | Stop reason: HOSPADM

## 2025-06-06 RX ORDER — VARENICLINE TARTRATE 0.5 MG/1
0.5 TABLET, FILM COATED ORAL DAILY
Status: DISCONTINUED | OUTPATIENT
Start: 2025-06-06 | End: 2025-06-12 | Stop reason: HOSPADM

## 2025-06-06 RX ORDER — AZITHROMYCIN 250 MG/1
250 TABLET, FILM COATED ORAL DAILY
Status: DISCONTINUED | OUTPATIENT
Start: 2025-06-06 | End: 2025-06-12 | Stop reason: HOSPADM

## 2025-06-06 RX ADMIN — SODIUM CHLORIDE: 0.9 INJECTION, SOLUTION INTRAVENOUS at 23:54

## 2025-06-06 RX ADMIN — TIOTROPIUM BROMIDE INHALATION SPRAY 2 PUFF: 3.12 SPRAY, METERED RESPIRATORY (INHALATION) at 08:56

## 2025-06-06 RX ADMIN — CYCLOBENZAPRINE 10 MG: 10 TABLET, FILM COATED ORAL at 20:53

## 2025-06-06 RX ADMIN — DOCUSATE SODIUM 100 MG: 100 CAPSULE, LIQUID FILLED ORAL at 20:53

## 2025-06-06 RX ADMIN — ENOXAPARIN SODIUM 40 MG: 100 INJECTION SUBCUTANEOUS at 20:53

## 2025-06-06 RX ADMIN — SODIUM CHLORIDE: 0.9 INJECTION, SOLUTION INTRAVENOUS at 03:22

## 2025-06-06 RX ADMIN — ESCITALOPRAM OXALATE 10 MG: 10 TABLET ORAL at 20:52

## 2025-06-06 RX ADMIN — SENNOSIDES 8.6 MG: 8.6 TABLET, FILM COATED ORAL at 20:53

## 2025-06-06 RX ADMIN — HYDROCODONE BITARTRATE AND ACETAMINOPHEN 1 TABLET: 5; 325 TABLET ORAL at 08:02

## 2025-06-06 RX ADMIN — BUDESONIDE AND FORMOTEROL FUMARATE DIHYDRATE 2 PUFF: 160; 4.5 AEROSOL RESPIRATORY (INHALATION) at 08:56

## 2025-06-06 RX ADMIN — PANTOPRAZOLE SODIUM 40 MG: 40 TABLET, DELAYED RELEASE ORAL at 06:16

## 2025-06-06 RX ADMIN — HYDROCODONE BITARTRATE AND ACETAMINOPHEN 1 TABLET: 5; 325 TABLET ORAL at 12:57

## 2025-06-06 RX ADMIN — DOCUSATE SODIUM 100 MG: 100 CAPSULE, LIQUID FILLED ORAL at 08:02

## 2025-06-06 RX ADMIN — AZITHROMYCIN DIHYDRATE 250 MG: 250 TABLET ORAL at 10:35

## 2025-06-06 ASSESSMENT — PAIN SCALES - GENERAL
PAINLEVEL_OUTOF10: 4
PAINLEVEL_OUTOF10: 5

## 2025-06-06 ASSESSMENT — PAIN DESCRIPTION - ORIENTATION
ORIENTATION: LEFT
ORIENTATION: LEFT

## 2025-06-06 ASSESSMENT — PATIENT HEALTH QUESTIONNAIRE - PHQ9
SUM OF ALL RESPONSES TO PHQ QUESTIONS 1-9: 0
1. LITTLE INTEREST OR PLEASURE IN DOING THINGS: NOT AT ALL
SUM OF ALL RESPONSES TO PHQ QUESTIONS 1-9: 0
SUM OF ALL RESPONSES TO PHQ QUESTIONS 1-9: 0
2. FEELING DOWN, DEPRESSED OR HOPELESS: NOT AT ALL
SUM OF ALL RESPONSES TO PHQ QUESTIONS 1-9: 0

## 2025-06-06 ASSESSMENT — PAIN - FUNCTIONAL ASSESSMENT
PAIN_FUNCTIONAL_ASSESSMENT: ACTIVITIES ARE NOT PREVENTED
PAIN_FUNCTIONAL_ASSESSMENT: PREVENTS OR INTERFERES SOME ACTIVE ACTIVITIES AND ADLS

## 2025-06-06 ASSESSMENT — PAIN DESCRIPTION - DESCRIPTORS
DESCRIPTORS: ACHING
DESCRIPTORS: ACHING

## 2025-06-06 ASSESSMENT — LIFESTYLE VARIABLES
HOW OFTEN DO YOU HAVE A DRINK CONTAINING ALCOHOL: NEVER
HOW MANY STANDARD DRINKS CONTAINING ALCOHOL DO YOU HAVE ON A TYPICAL DAY: PATIENT DOES NOT DRINK

## 2025-06-06 ASSESSMENT — PAIN DESCRIPTION - LOCATION
LOCATION: LEG;KNEE
LOCATION: KNEE;LEG

## 2025-06-06 NOTE — DISCHARGE INSTR - COC
Therapy and Occupational Therapy  Weight Bearing Status/Restrictions: strict no weight bearing left leg  Other Medical Equipment (for information only, NOT a DME order):  walker  Other Treatments: None    Patient's personal belongings (please select all that are sent with patient):  Glasses, Dentures upper and lower, cell phone    RN SIGNATURE:  Electronically signed by Katarina Gonzalez RN on 6/12/25 at 11:33 AM EDT    CASE MANAGEMENT/SOCIAL WORK SECTION    Inpatient Status Date: 06/05/2025    Readmission Risk Assessment Score:  [facility] RISK OF UNPLANNED READMISSION 2.0             9.8 Total Score        Discharging to Facility/ Agency   Name: [facility]  Address: [address] [place] [address] [address] [place]  [address]  Phone: [phone]  Fax: [phone]    Dialysis Facility (if applicable)   Name:  Address:  Dialysis Schedule:  Phone:  Fax:    / signature: Electronically signed by PORTILLO Burch on 6/11/25 at 2:22 PM EDT    PHYSICIAN SECTION    Prognosis: Good    Condition at Discharge: Stable    Rehab Potential (if transferring to Rehab): Good      Physician Certification: I certify the above information and transfer of Milli Rice  is necessary for the continuing treatment of the diagnosis listed and that she requires Skilled Nursing Facility for greater 30 days.     Update Admission H&P: No change in H&P    PHYSICIAN SIGNATURE:  Electronically signed by Ale Negron PA-C on 6/12/25 at 6:11AM EDT

## 2025-06-06 NOTE — CARE COORDINATION
Case Management Assessment Initial Evaluation    Date/Time of Evaluation: 6/6/2025 7:21 AM  Assessment Completed by: Sabrina Hernandez RN    If patient is discharged prior to next notation, then this note serves as note for discharge by case management.    Patient Name: Milli Rice                   YOB: 1950  Diagnosis: Closed fracture of medial portion of left tibial plateau, initial encounter [S82.132A]                   Date / Time: 6/5/2025  8:50 AM  Location: Counts include 234 beds at the [facility]     Patient Admission Status: Inpatient   Readmission Risk Low 0-14, Mod 15-19), High > 20: Readmission Risk Score: 9.8    Current PCP: Garret Milner MD  Health Care Decision Makers:     Additional Case Management Notes: to ED via EMS for evaluation of left knee pain.  Patient states she had multiple falls with the most recent fall on the 20th of May.    Ortho primary. Pain control, need therapy notes.     Procedures: na    Imaging:   CT left knee:  Nondisplaced comminuted acute fracture of the intercondylar region of the tibia extending into the medial tibial plateau.       Patient Goals/Plan/Treatment Preferences: Met with pt, daughter, and . Lives at home with ; he cannot lift pt as he has had stroke in past with right side affected. Pt has some DME, NWB to LLE. Agrees to SNF and SW consulted.                 06/06/25 120   Service Assessment   Patient Orientation Alert and Oriented   Cognition Alert   History Provided By Patient   Primary Caregiver Self   Accompanied By/Relationship  and daughter   Support Systems Children;Spouse/Significant Other;Friends/Neighbors   Patient's Healthcare Decision Maker is: Named in Scanned ACP Document   PCP Verified by CM Yes   Last Visit to PCP Within last 3 months   Prior Functional Level Assistance with the following:;Bathing;Cooking;Housework;Shopping   Current Functional Level Assistance with the following:;Bathing;Cooking;Housework;Shopping   Can patient

## 2025-06-06 NOTE — CARE COORDINATION
DISCHARGE PLANNING EVALUATION  6/6/25, 3:13 PM EDT    Reason for Referral: discharge plan  Decision Maker: makes own decisions, no POA  Current Services: none   New Services Requested: possible snf  Family/ Social/ Home environment: patient lives at home with family, has been independent with care   Payment Source:[employer] [place] medicare   Transportation at Discharge: undetermined   Post-acute (PAC) provider list was provided to patient. Patient was informed of their freedom to choose PAC provider. Discussed and offered to show the patient the relevant PAC Providers quality and resource use measures on Medicare Compare web site via computer based on patient's goals of care and treatment preferences. Questions regarding selection process were answered.      Teach Back Method used with patient and daughter regarding care plan and discharge plan  Patient and daughter verbalized understanding of the plan of care and contribute to goal setting.       Patient preferences and discharge plan:  spoke with patient and daughter about discharge plan.  Family is encouraging snf, patient is reluctant.  If agreeing to snf, will need precert, first choice would be [facility] if patient agrees to snf.     Electronically signed by PORTILLO Burch on 6/6/2025 at 3:13 PM

## 2025-06-07 PROCEDURE — 94640 AIRWAY INHALATION TREATMENT: CPT

## 2025-06-07 PROCEDURE — 1200000000 HC SEMI PRIVATE

## 2025-06-07 PROCEDURE — 97530 THERAPEUTIC ACTIVITIES: CPT

## 2025-06-07 PROCEDURE — 97542 WHEELCHAIR MNGMENT TRAINING: CPT

## 2025-06-07 PROCEDURE — 6370000000 HC RX 637 (ALT 250 FOR IP): Performed by: PHYSICIAN ASSISTANT

## 2025-06-07 PROCEDURE — 6360000002 HC RX W HCPCS: Performed by: PHYSICIAN ASSISTANT

## 2025-06-07 RX ADMIN — ENOXAPARIN SODIUM 40 MG: 100 INJECTION SUBCUTANEOUS at 20:33

## 2025-06-07 RX ADMIN — HYDROCODONE BITARTRATE AND ACETAMINOPHEN 1 TABLET: 5; 325 TABLET ORAL at 20:33

## 2025-06-07 RX ADMIN — AZITHROMYCIN DIHYDRATE 250 MG: 250 TABLET ORAL at 10:51

## 2025-06-07 RX ADMIN — SENNOSIDES 8.6 MG: 8.6 TABLET, FILM COATED ORAL at 20:32

## 2025-06-07 RX ADMIN — ESCITALOPRAM OXALATE 10 MG: 10 TABLET ORAL at 20:33

## 2025-06-07 RX ADMIN — PANTOPRAZOLE SODIUM 40 MG: 40 TABLET, DELAYED RELEASE ORAL at 06:52

## 2025-06-07 RX ADMIN — VARENICLINE TARTRATE 0.5 MG: 0.5 TABLET, FILM COATED ORAL at 10:56

## 2025-06-07 RX ADMIN — CYCLOBENZAPRINE 10 MG: 10 TABLET, FILM COATED ORAL at 20:32

## 2025-06-07 RX ADMIN — DOCUSATE SODIUM 100 MG: 100 CAPSULE, LIQUID FILLED ORAL at 10:51

## 2025-06-07 RX ADMIN — HYDROCODONE BITARTRATE AND ACETAMINOPHEN 1 TABLET: 5; 325 TABLET ORAL at 13:10

## 2025-06-07 RX ADMIN — DOCUSATE SODIUM 100 MG: 100 CAPSULE, LIQUID FILLED ORAL at 20:32

## 2025-06-07 ASSESSMENT — PAIN - FUNCTIONAL ASSESSMENT: PAIN_FUNCTIONAL_ASSESSMENT: ACTIVITIES ARE NOT PREVENTED

## 2025-06-07 ASSESSMENT — PAIN SCALES - GENERAL
PAINLEVEL_OUTOF10: 6
PAINLEVEL_OUTOF10: 7

## 2025-06-07 ASSESSMENT — PAIN DESCRIPTION - DESCRIPTORS: DESCRIPTORS: ACHING

## 2025-06-07 ASSESSMENT — PAIN DESCRIPTION - LOCATION: LOCATION: LEG;KNEE

## 2025-06-07 ASSESSMENT — PAIN DESCRIPTION - ORIENTATION: ORIENTATION: LEFT

## 2025-06-08 ENCOUNTER — APPOINTMENT (OUTPATIENT)
Dept: GENERAL RADIOLOGY | Age: 75
DRG: 543 | End: 2025-06-08
Payer: MEDICARE

## 2025-06-08 LAB
ANION GAP SERPL CALC-SCNC: 12 MEQ/L (ref 8–16)
BUN SERPL-MCNC: 15 MG/DL (ref 8–23)
CALCIUM SERPL-MCNC: 9 MG/DL (ref 8.8–10.2)
CHLORIDE SERPL-SCNC: 106 MEQ/L (ref 98–111)
CO2 SERPL-SCNC: 21 MEQ/L (ref 22–29)
CREAT SERPL-MCNC: 0.9 MG/DL (ref 0.5–0.9)
DEPRECATED RDW RBC AUTO: 44.2 FL (ref 35–45)
EKG ATRIAL RATE: 60 BPM
EKG ATRIAL RATE: 65 BPM
EKG P AXIS: 66 DEGREES
EKG P-R INTERVAL: 138 MS
EKG Q-T INTERVAL: 430 MS
EKG Q-T INTERVAL: 438 MS
EKG QRS DURATION: 82 MS
EKG QRS DURATION: 88 MS
EKG QTC CALCULATION (BAZETT): 438 MS
EKG QTC CALCULATION (BAZETT): 467 MS
EKG R AXIS: 40 DEGREES
EKG R AXIS: 46 DEGREES
EKG T AXIS: 68 DEGREES
EKG T AXIS: 75 DEGREES
EKG VENTRICULAR RATE: 60 BPM
EKG VENTRICULAR RATE: 71 BPM
ERYTHROCYTE [DISTWIDTH] IN BLOOD BY AUTOMATED COUNT: 13.7 % (ref 11.5–14.5)
GFR SERPL CREATININE-BSD FRML MDRD: 66 ML/MIN/1.73M2
GLUCOSE SERPL-MCNC: 136 MG/DL (ref 74–109)
HCT VFR BLD AUTO: 40 % (ref 37–47)
HGB BLD-MCNC: 12.7 GM/DL (ref 12–16)
MAGNESIUM SERPL-MCNC: 2.1 MG/DL (ref 1.6–2.6)
MCH RBC QN AUTO: 28.1 PG (ref 26–33)
MCHC RBC AUTO-ENTMCNC: 31.8 GM/DL (ref 32.2–35.5)
MCV RBC AUTO: 88.5 FL (ref 81–99)
NT-PROBNP SERPL IA-MCNC: 569 PG/ML (ref 0–449)
PLATELET # BLD AUTO: 253 THOU/MM3 (ref 130–400)
PMV BLD AUTO: 8.8 FL (ref 9.4–12.4)
POTASSIUM SERPL-SCNC: 4.4 MEQ/L (ref 3.5–5.2)
RBC # BLD AUTO: 4.52 MILL/MM3 (ref 4.2–5.4)
SODIUM SERPL-SCNC: 139 MEQ/L (ref 135–145)
WBC # BLD AUTO: 6 THOU/MM3 (ref 4.8–10.8)

## 2025-06-08 PROCEDURE — 93010 ELECTROCARDIOGRAM REPORT: CPT | Performed by: INTERNAL MEDICINE

## 2025-06-08 PROCEDURE — 6370000000 HC RX 637 (ALT 250 FOR IP): Performed by: PHYSICIAN ASSISTANT

## 2025-06-08 PROCEDURE — 94761 N-INVAS EAR/PLS OXIMETRY MLT: CPT

## 2025-06-08 PROCEDURE — 85027 COMPLETE CBC AUTOMATED: CPT

## 2025-06-08 PROCEDURE — 6360000002 HC RX W HCPCS: Performed by: INTERNAL MEDICINE

## 2025-06-08 PROCEDURE — 6360000002 HC RX W HCPCS: Performed by: PHYSICIAN ASSISTANT

## 2025-06-08 PROCEDURE — 99222 1ST HOSP IP/OBS MODERATE 55: CPT | Performed by: INTERNAL MEDICINE

## 2025-06-08 PROCEDURE — 1200000000 HC SEMI PRIVATE

## 2025-06-08 PROCEDURE — 2500000003 HC RX 250 WO HCPCS: Performed by: INTERNAL MEDICINE

## 2025-06-08 PROCEDURE — 36415 COLL VENOUS BLD VENIPUNCTURE: CPT

## 2025-06-08 PROCEDURE — 83735 ASSAY OF MAGNESIUM: CPT

## 2025-06-08 PROCEDURE — 1200000003 HC TELEMETRY R&B

## 2025-06-08 PROCEDURE — 93005 ELECTROCARDIOGRAM TRACING: CPT | Performed by: INTERNAL MEDICINE

## 2025-06-08 PROCEDURE — 94640 AIRWAY INHALATION TREATMENT: CPT

## 2025-06-08 PROCEDURE — 83880 ASSAY OF NATRIURETIC PEPTIDE: CPT

## 2025-06-08 PROCEDURE — 71045 X-RAY EXAM CHEST 1 VIEW: CPT

## 2025-06-08 PROCEDURE — 93005 ELECTROCARDIOGRAM TRACING: CPT | Performed by: ORTHOPAEDIC SURGERY

## 2025-06-08 PROCEDURE — 6370000000 HC RX 637 (ALT 250 FOR IP): Performed by: INTERNAL MEDICINE

## 2025-06-08 PROCEDURE — 80048 BASIC METABOLIC PNL TOTAL CA: CPT

## 2025-06-08 RX ORDER — ALBUTEROL SULFATE 90 UG/1
2 INHALANT RESPIRATORY (INHALATION) EVERY 6 HOURS PRN
Status: DISCONTINUED | OUTPATIENT
Start: 2025-06-08 | End: 2025-06-12 | Stop reason: HOSPADM

## 2025-06-08 RX ORDER — IPRATROPIUM BROMIDE AND ALBUTEROL SULFATE 2.5; .5 MG/3ML; MG/3ML
1 SOLUTION RESPIRATORY (INHALATION)
Status: DISCONTINUED | OUTPATIENT
Start: 2025-06-08 | End: 2025-06-12

## 2025-06-08 RX ORDER — BUDESONIDE 0.5 MG/2ML
0.5 INHALANT ORAL
Status: DISCONTINUED | OUTPATIENT
Start: 2025-06-08 | End: 2025-06-12 | Stop reason: HOSPADM

## 2025-06-08 RX ORDER — POLYETHYLENE GLYCOL 3350 17 G/17G
17 POWDER, FOR SOLUTION ORAL DAILY
Status: DISCONTINUED | OUTPATIENT
Start: 2025-06-08 | End: 2025-06-12 | Stop reason: HOSPADM

## 2025-06-08 RX ORDER — ACETAMINOPHEN 325 MG/1
650 TABLET ORAL EVERY 6 HOURS PRN
Status: DISCONTINUED | OUTPATIENT
Start: 2025-06-08 | End: 2025-06-12 | Stop reason: HOSPADM

## 2025-06-08 RX ORDER — PREDNISONE 20 MG/1
40 TABLET ORAL DAILY
Status: COMPLETED | OUTPATIENT
Start: 2025-06-09 | End: 2025-06-12

## 2025-06-08 RX ORDER — DOXYCYCLINE HYCLATE 100 MG
100 TABLET ORAL EVERY 12 HOURS SCHEDULED
Status: DISCONTINUED | OUTPATIENT
Start: 2025-06-08 | End: 2025-06-12 | Stop reason: HOSPADM

## 2025-06-08 RX ADMIN — ENOXAPARIN SODIUM 40 MG: 100 INJECTION SUBCUTANEOUS at 21:28

## 2025-06-08 RX ADMIN — ESCITALOPRAM OXALATE 10 MG: 10 TABLET ORAL at 21:27

## 2025-06-08 RX ADMIN — AZITHROMYCIN DIHYDRATE 250 MG: 250 TABLET ORAL at 09:57

## 2025-06-08 RX ADMIN — VARENICLINE TARTRATE 0.5 MG: 0.5 TABLET, FILM COATED ORAL at 09:58

## 2025-06-08 RX ADMIN — DOXYCYCLINE HYCLATE 100 MG: 100 TABLET, COATED ORAL at 21:27

## 2025-06-08 RX ADMIN — CYCLOBENZAPRINE 10 MG: 10 TABLET, FILM COATED ORAL at 21:28

## 2025-06-08 RX ADMIN — ACETAMINOPHEN 650 MG: 325 TABLET ORAL at 09:57

## 2025-06-08 RX ADMIN — BUDESONIDE 500 MCG: 0.5 INHALANT RESPIRATORY (INHALATION) at 19:50

## 2025-06-08 RX ADMIN — WATER 40 MG: 1 INJECTION INTRAMUSCULAR; INTRAVENOUS; SUBCUTANEOUS at 22:33

## 2025-06-08 RX ADMIN — IPRATROPIUM BROMIDE AND ALBUTEROL SULFATE 1 DOSE: .5; 3 SOLUTION RESPIRATORY (INHALATION) at 19:45

## 2025-06-08 RX ADMIN — DOCUSATE SODIUM 100 MG: 100 CAPSULE, LIQUID FILLED ORAL at 09:57

## 2025-06-08 RX ADMIN — PANTOPRAZOLE SODIUM 40 MG: 40 TABLET, DELAYED RELEASE ORAL at 06:47

## 2025-06-08 ASSESSMENT — PAIN SCALES - GENERAL: PAINLEVEL_OUTOF10: 6

## 2025-06-08 ASSESSMENT — PAIN DESCRIPTION - ORIENTATION: ORIENTATION: LEFT

## 2025-06-08 ASSESSMENT — PAIN DESCRIPTION - LOCATION: LOCATION: KNEE

## 2025-06-08 ASSESSMENT — PAIN - FUNCTIONAL ASSESSMENT: PAIN_FUNCTIONAL_ASSESSMENT: ACTIVITIES ARE NOT PREVENTED

## 2025-06-08 ASSESSMENT — PAIN DESCRIPTION - DESCRIPTORS: DESCRIPTORS: ACHING

## 2025-06-08 NOTE — CONSULTS
Hospitalist Initial Consultation      Patient: Milli Rice 74 y.o. female       Unit/Bed: 7K-07/007-A  Admission date: 6/5/2025   Date of service: 06/08/25       ASSESSMENT AND PLAN    COPD Exacerbation / Dyspnea:   One-time dose of Solu-Medrol 40 mg today, start prednisone 40 mg x 4 doses tomorrow morning  Doxycycline for COPD exacerbation  No pneumonia on CXR, vascular congestion - check BNP  Stopped fluids.  No peripheral edema.  Consider 1x dose bumex after BMP returns  Will obtain echo  Hold trelegy and replace with duonebs and budesonide for now  IS and acapella  Sinus bradyarrhythmia on EKG  Not on any AV neftali blockers  Start on telemetry  Repeat EKG due to artifact  Obtain echo.  Slight murmur noted on examination  History of asthma/COPD overlap syndrome.  Follows pulmonology, see above  Left proximal tibial plateau fracture: Nonoperative management per primary  PT OT  DVT prophylaxis  Constipation: Continue on bowel regimen will intensify  Tobacco abuse, attempting to quit on Chantix we will continue.     Data Reviewed:  Labs and Radiology: see chart for reports, otherwise discussed above.   EKG independent interpretation: Sinus bradycardia with PJCs and PACs, artifactual so we will repeat EKG.    Thank you, Arnoldo Hanna MD, for the consultation.  Hospitalist service will continue to follow along.      Electronically signed by Neo Espinoza DO on 6/8/2025 at 5:00 PM    ===================================================================    SUBJECTIVE    Chief Complaint: Fall    Date of Service/Reason for consult: Pt seen/examined  on 06/08/25 for medical management and shortness of breath/abnormal EKG.     History of present illness:  Milli Rice is a 74 y.o. female who initially presented on 6/5 for a fall that she initially had sustained on 5/20.  Previous workup after that fall did not reveal any acute process however has had continued difficulty with having ambulation, however when

## 2025-06-08 NOTE — PROCEDURES
PROCEDURE NOTE  Date: 6/8/2025   Name: Milli Rice  YOB: 1950    Procedures  12 lead EKG completed. Results handed to Allison HOUGH.

## 2025-06-08 NOTE — PROCEDURES
PROCEDURE NOTE  Date: 6/8/2025   Name: Milli Rice  YOB: 1950    Procedures  12 lead EKG completed. Results handed to Anna JORDAN.

## 2025-06-09 ENCOUNTER — APPOINTMENT (OUTPATIENT)
Age: 75
DRG: 543 | End: 2025-06-09
Attending: INTERNAL MEDICINE
Payer: MEDICARE

## 2025-06-09 LAB
ECHO AO ASC DIAM: 3 CM
ECHO AO ASCENDING AORTA INDEX: 1.89 CM/M2
ECHO AV CUSP MM: 1.6 CM
ECHO AV MEAN GRADIENT: 7 MMHG
ECHO AV MEAN VELOCITY: 1.2 M/S
ECHO AV PEAK GRADIENT: 14 MMHG
ECHO AV PEAK VELOCITY: 1.8 M/S
ECHO AV VELOCITY RATIO: 0.72
ECHO AV VTI: 36.8 CM
ECHO BSA: 1.61 M2
ECHO EST RA PRESSURE: 3 MMHG
ECHO LA AREA 2C: 12.3 CM2
ECHO LA AREA 4C: 16.2 CM2
ECHO LA DIAMETER INDEX: 1.51 CM/M2
ECHO LA DIAMETER: 2.4 CM
ECHO LA MAJOR AXIS: 5.4 CM
ECHO LA MINOR AXIS: 4.4 CM
ECHO LA VOL BP: 36 ML (ref 22–52)
ECHO LA VOL MOD A2C: 28 ML (ref 22–52)
ECHO LA VOL MOD A4C: 38 ML (ref 22–52)
ECHO LA VOL/BSA BIPLANE: 23 ML/M2 (ref 16–34)
ECHO LA VOLUME INDEX MOD A2C: 18 ML/M2 (ref 16–34)
ECHO LA VOLUME INDEX MOD A4C: 24 ML/M2 (ref 16–34)
ECHO LV E' LATERAL VELOCITY: 10.2 CM/S
ECHO LV E' SEPTAL VELOCITY: 7.5 CM/S
ECHO LV EDV A2C: 47 ML
ECHO LV EDV A4C: 77 ML
ECHO LV EDV INDEX A4C: 48 ML/M2
ECHO LV EDV NDEX A2C: 30 ML/M2
ECHO LV EF PHYSICIAN: 55 %
ECHO LV EJECTION FRACTION A2C: 56 %
ECHO LV EJECTION FRACTION A4C: 61 %
ECHO LV EJECTION FRACTION BIPLANE: 59 % (ref 55–100)
ECHO LV ESV A2C: 21 ML
ECHO LV ESV A4C: 30 ML
ECHO LV ESV INDEX A2C: 13 ML/M2
ECHO LV ESV INDEX A4C: 19 ML/M2
ECHO LV FRACTIONAL SHORTENING: 29 % (ref 28–44)
ECHO LV INTERNAL DIMENSION DIASTOLE INDEX: 2.39 CM/M2
ECHO LV INTERNAL DIMENSION DIASTOLIC: 3.8 CM (ref 3.9–5.3)
ECHO LV INTERNAL DIMENSION SYSTOLIC INDEX: 1.7 CM/M2
ECHO LV INTERNAL DIMENSION SYSTOLIC: 2.7 CM
ECHO LV ISOVOLUMETRIC RELAXATION TIME (IVRT): 63 MS
ECHO LV IVSD: 1.1 CM (ref 0.6–0.9)
ECHO LV MASS 2D: 125.8 G (ref 67–162)
ECHO LV MASS INDEX 2D: 79.1 G/M2 (ref 43–95)
ECHO LV POSTERIOR WALL DIASTOLIC: 1 CM (ref 0.6–0.9)
ECHO LV RELATIVE WALL THICKNESS RATIO: 0.53
ECHO LVOT AV VTI INDEX: 0.73
ECHO LVOT MEAN GRADIENT: 3 MMHG
ECHO LVOT PEAK GRADIENT: 7 MMHG
ECHO LVOT PEAK VELOCITY: 1.3 M/S
ECHO LVOT VTI: 26.7 CM
ECHO MV A VELOCITY: 1.05 M/S
ECHO MV E DECELERATION TIME (DT): 324 MS
ECHO MV E VELOCITY: 0.97 M/S
ECHO MV E/A RATIO: 0.92
ECHO MV E/E' LATERAL: 9.51
ECHO MV E/E' RATIO (AVERAGED): 11.22
ECHO MV E/E' SEPTAL: 12.93
ECHO PULMONARY ARTERY END DIASTOLIC PRESSURE: 6 MMHG
ECHO PV MAX VELOCITY: 1 M/S
ECHO PV PEAK GRADIENT: 4 MMHG
ECHO PV REGURGITANT MAX VELOCITY: 1.2 M/S
ECHO RIGHT VENTRICULAR SYSTOLIC PRESSURE (RVSP): 30 MMHG
ECHO RV INTERNAL DIMENSION: 2.5 CM
ECHO RV TAPSE: 1.8 CM (ref 1.7–?)
ECHO TV E WAVE: 0.5 M/S
ECHO TV REGURGITANT MAX VELOCITY: 2.58 M/S
ECHO TV REGURGITANT PEAK GRADIENT: 27 MMHG

## 2025-06-09 PROCEDURE — 1200000003 HC TELEMETRY R&B

## 2025-06-09 PROCEDURE — 6360000002 HC RX W HCPCS: Performed by: INTERNAL MEDICINE

## 2025-06-09 PROCEDURE — 94640 AIRWAY INHALATION TREATMENT: CPT

## 2025-06-09 PROCEDURE — 6370000000 HC RX 637 (ALT 250 FOR IP): Performed by: INTERNAL MEDICINE

## 2025-06-09 PROCEDURE — 93306 TTE W/DOPPLER COMPLETE: CPT | Performed by: INTERNAL MEDICINE

## 2025-06-09 PROCEDURE — 97110 THERAPEUTIC EXERCISES: CPT

## 2025-06-09 PROCEDURE — 93306 TTE W/DOPPLER COMPLETE: CPT

## 2025-06-09 PROCEDURE — 97530 THERAPEUTIC ACTIVITIES: CPT

## 2025-06-09 PROCEDURE — 6370000000 HC RX 637 (ALT 250 FOR IP): Performed by: PHYSICIAN ASSISTANT

## 2025-06-09 PROCEDURE — 1200000000 HC SEMI PRIVATE

## 2025-06-09 PROCEDURE — 6360000002 HC RX W HCPCS: Performed by: PHYSICIAN ASSISTANT

## 2025-06-09 PROCEDURE — 99232 SBSQ HOSP IP/OBS MODERATE 35: CPT | Performed by: PHYSICIAN ASSISTANT

## 2025-06-09 RX ADMIN — BUDESONIDE 500 MCG: 0.5 INHALANT RESPIRATORY (INHALATION) at 08:11

## 2025-06-09 RX ADMIN — PANTOPRAZOLE SODIUM 40 MG: 40 TABLET, DELAYED RELEASE ORAL at 06:45

## 2025-06-09 RX ADMIN — ESCITALOPRAM OXALATE 10 MG: 10 TABLET ORAL at 21:10

## 2025-06-09 RX ADMIN — DOCUSATE SODIUM 100 MG: 100 CAPSULE, LIQUID FILLED ORAL at 10:18

## 2025-06-09 RX ADMIN — CYCLOBENZAPRINE 10 MG: 10 TABLET, FILM COATED ORAL at 21:10

## 2025-06-09 RX ADMIN — IPRATROPIUM BROMIDE AND ALBUTEROL SULFATE 1 DOSE: .5; 3 SOLUTION RESPIRATORY (INHALATION) at 15:56

## 2025-06-09 RX ADMIN — IPRATROPIUM BROMIDE AND ALBUTEROL SULFATE 1 DOSE: .5; 3 SOLUTION RESPIRATORY (INHALATION) at 08:11

## 2025-06-09 RX ADMIN — SENNOSIDES 8.6 MG: 8.6 TABLET, FILM COATED ORAL at 21:09

## 2025-06-09 RX ADMIN — DOXYCYCLINE HYCLATE 100 MG: 100 TABLET, COATED ORAL at 21:09

## 2025-06-09 RX ADMIN — IPRATROPIUM BROMIDE AND ALBUTEROL SULFATE 1 DOSE: .5; 3 SOLUTION RESPIRATORY (INHALATION) at 21:29

## 2025-06-09 RX ADMIN — VARENICLINE TARTRATE 0.5 MG: 0.5 TABLET, FILM COATED ORAL at 10:17

## 2025-06-09 RX ADMIN — DOCUSATE SODIUM 100 MG: 100 CAPSULE, LIQUID FILLED ORAL at 21:09

## 2025-06-09 RX ADMIN — DOXYCYCLINE HYCLATE 100 MG: 100 TABLET, COATED ORAL at 10:15

## 2025-06-09 RX ADMIN — ENOXAPARIN SODIUM 40 MG: 100 INJECTION SUBCUTANEOUS at 21:09

## 2025-06-09 RX ADMIN — BUDESONIDE 500 MCG: 0.5 INHALANT RESPIRATORY (INHALATION) at 21:33

## 2025-06-09 RX ADMIN — PREDNISONE 40 MG: 20 TABLET ORAL at 10:15

## 2025-06-09 RX ADMIN — AZITHROMYCIN DIHYDRATE 250 MG: 250 TABLET ORAL at 10:15

## 2025-06-09 ASSESSMENT — PAIN DESCRIPTION - LOCATION: LOCATION: LEG

## 2025-06-09 ASSESSMENT — PAIN DESCRIPTION - ORIENTATION: ORIENTATION: LEFT

## 2025-06-09 ASSESSMENT — PAIN DESCRIPTION - DESCRIPTORS: DESCRIPTORS: ACHING

## 2025-06-09 ASSESSMENT — PAIN SCALES - GENERAL: PAINLEVEL_OUTOF10: 3

## 2025-06-09 NOTE — CARE COORDINATION
6/9/25, 12:34 PM EDT    DISCHARGE PLANNING EVALUATION    Spoke with patient and her .  They are in agreement with snf, [facility], referral initiated.

## 2025-06-10 LAB
ANION GAP SERPL CALC-SCNC: 11 MEQ/L (ref 8–16)
BASOPHILS ABSOLUTE: 0 THOU/MM3 (ref 0–0.1)
BASOPHILS NFR BLD AUTO: 0.2 %
BUN SERPL-MCNC: 20 MG/DL (ref 8–23)
CALCIUM SERPL-MCNC: 9.5 MG/DL (ref 8.8–10.2)
CHLORIDE SERPL-SCNC: 105 MEQ/L (ref 98–111)
CO2 SERPL-SCNC: 24 MEQ/L (ref 22–29)
CREAT SERPL-MCNC: 0.8 MG/DL (ref 0.5–0.9)
DEPRECATED RDW RBC AUTO: 44.7 FL (ref 35–45)
EKG ATRIAL RATE: 82 BPM
EKG Q-T INTERVAL: 376 MS
EKG QRS DURATION: 84 MS
EKG QTC CALCULATION (BAZETT): 439 MS
EKG R AXIS: 61 DEGREES
EKG T AXIS: 76 DEGREES
EKG VENTRICULAR RATE: 82 BPM
EOSINOPHIL NFR BLD AUTO: 0.1 %
EOSINOPHILS ABSOLUTE: 0 THOU/MM3 (ref 0–0.4)
ERYTHROCYTE [DISTWIDTH] IN BLOOD BY AUTOMATED COUNT: 13.9 % (ref 11.5–14.5)
GFR SERPL CREATININE-BSD FRML MDRD: 76 ML/MIN/1.73M2
GLUCOSE SERPL-MCNC: 83 MG/DL (ref 74–109)
HCT VFR BLD AUTO: 39.5 % (ref 37–47)
HGB BLD-MCNC: 12.8 GM/DL (ref 12–16)
IMM GRANULOCYTES # BLD AUTO: 0.03 THOU/MM3 (ref 0–0.07)
IMM GRANULOCYTES NFR BLD AUTO: 0.3 %
LYMPHOCYTES ABSOLUTE: 2.5 THOU/MM3 (ref 1–4.8)
LYMPHOCYTES NFR BLD AUTO: 28.6 %
MCH RBC QN AUTO: 28.5 PG (ref 26–33)
MCHC RBC AUTO-ENTMCNC: 32.4 GM/DL (ref 32.2–35.5)
MCV RBC AUTO: 88 FL (ref 81–99)
MONOCYTES ABSOLUTE: 0.6 THOU/MM3 (ref 0.4–1.3)
MONOCYTES NFR BLD AUTO: 7.3 %
NEUTROPHILS ABSOLUTE: 5.6 THOU/MM3 (ref 1.8–7.7)
NEUTROPHILS NFR BLD AUTO: 63.5 %
NRBC BLD AUTO-RTO: 0 /100 WBC
PLATELET # BLD AUTO: 249 THOU/MM3 (ref 130–400)
PMV BLD AUTO: 9 FL (ref 9.4–12.4)
POTASSIUM SERPL-SCNC: 4 MEQ/L (ref 3.5–5.2)
RBC # BLD AUTO: 4.49 MILL/MM3 (ref 4.2–5.4)
SODIUM SERPL-SCNC: 140 MEQ/L (ref 135–145)
WBC # BLD AUTO: 8.8 THOU/MM3 (ref 4.8–10.8)

## 2025-06-10 PROCEDURE — 36415 COLL VENOUS BLD VENIPUNCTURE: CPT

## 2025-06-10 PROCEDURE — 97110 THERAPEUTIC EXERCISES: CPT

## 2025-06-10 PROCEDURE — 97535 SELF CARE MNGMENT TRAINING: CPT

## 2025-06-10 PROCEDURE — 6370000000 HC RX 637 (ALT 250 FOR IP): Performed by: PHYSICIAN ASSISTANT

## 2025-06-10 PROCEDURE — 6370000000 HC RX 637 (ALT 250 FOR IP): Performed by: INTERNAL MEDICINE

## 2025-06-10 PROCEDURE — 6360000002 HC RX W HCPCS: Performed by: INTERNAL MEDICINE

## 2025-06-10 PROCEDURE — 1200000003 HC TELEMETRY R&B

## 2025-06-10 PROCEDURE — 93010 ELECTROCARDIOGRAM REPORT: CPT | Performed by: INTERNAL MEDICINE

## 2025-06-10 PROCEDURE — 6360000002 HC RX W HCPCS: Performed by: PHYSICIAN ASSISTANT

## 2025-06-10 PROCEDURE — 94640 AIRWAY INHALATION TREATMENT: CPT

## 2025-06-10 PROCEDURE — 93005 ELECTROCARDIOGRAM TRACING: CPT | Performed by: PHYSICIAN ASSISTANT

## 2025-06-10 PROCEDURE — 94761 N-INVAS EAR/PLS OXIMETRY MLT: CPT

## 2025-06-10 PROCEDURE — 97530 THERAPEUTIC ACTIVITIES: CPT

## 2025-06-10 PROCEDURE — 1200000000 HC SEMI PRIVATE

## 2025-06-10 PROCEDURE — 99233 SBSQ HOSP IP/OBS HIGH 50: CPT | Performed by: PHYSICIAN ASSISTANT

## 2025-06-10 PROCEDURE — 85025 COMPLETE CBC W/AUTO DIFF WBC: CPT

## 2025-06-10 PROCEDURE — 80048 BASIC METABOLIC PNL TOTAL CA: CPT

## 2025-06-10 RX ORDER — HYDROCODONE BITARTRATE AND ACETAMINOPHEN 5; 325 MG/1; MG/1
1 TABLET ORAL EVERY 6 HOURS PRN
Qty: 28 TABLET | Refills: 0 | Status: SHIPPED | OUTPATIENT
Start: 2025-06-10 | End: 2025-06-17

## 2025-06-10 RX ORDER — ENOXAPARIN SODIUM 100 MG/ML
40 INJECTION SUBCUTANEOUS EVERY 24 HOURS
Qty: 8.4 ML | Refills: 0 | Status: SHIPPED | OUTPATIENT
Start: 2025-06-10 | End: 2025-07-01

## 2025-06-10 RX ORDER — CYCLOBENZAPRINE HCL 10 MG
10 TABLET ORAL NIGHTLY
Qty: 10 TABLET | Refills: 0 | Status: SHIPPED | OUTPATIENT
Start: 2025-06-10 | End: 2025-06-20

## 2025-06-10 RX ORDER — PSEUDOEPHEDRINE HCL 30 MG
100 TABLET ORAL 2 TIMES DAILY PRN
Qty: 20 CAPSULE | Refills: 0 | Status: SHIPPED | OUTPATIENT
Start: 2025-06-10

## 2025-06-10 RX ORDER — SENNOSIDES 8.6 MG/1
1 TABLET ORAL NIGHTLY
Qty: 20 TABLET | Refills: 0 | Status: SHIPPED | OUTPATIENT
Start: 2025-06-10 | End: 2025-06-30

## 2025-06-10 RX ORDER — PANTOPRAZOLE SODIUM 40 MG/1
40 TABLET, DELAYED RELEASE ORAL
Qty: 30 TABLET | Refills: 0 | Status: SHIPPED | OUTPATIENT
Start: 2025-06-11 | End: 2025-07-11

## 2025-06-10 RX ADMIN — DOXYCYCLINE HYCLATE 100 MG: 100 TABLET, COATED ORAL at 21:26

## 2025-06-10 RX ADMIN — BUDESONIDE 500 MCG: 0.5 INHALANT RESPIRATORY (INHALATION) at 08:40

## 2025-06-10 RX ADMIN — VARENICLINE TARTRATE 0.5 MG: 0.5 TABLET, FILM COATED ORAL at 10:51

## 2025-06-10 RX ADMIN — SENNOSIDES 8.6 MG: 8.6 TABLET, FILM COATED ORAL at 21:26

## 2025-06-10 RX ADMIN — ENOXAPARIN SODIUM 40 MG: 100 INJECTION SUBCUTANEOUS at 21:26

## 2025-06-10 RX ADMIN — IPRATROPIUM BROMIDE AND ALBUTEROL SULFATE 1 DOSE: .5; 3 SOLUTION RESPIRATORY (INHALATION) at 15:40

## 2025-06-10 RX ADMIN — LIDOCAINE HYDROCHLORIDE: 20 SOLUTION ORAL at 15:27

## 2025-06-10 RX ADMIN — DOCUSATE SODIUM 100 MG: 100 CAPSULE, LIQUID FILLED ORAL at 21:26

## 2025-06-10 RX ADMIN — IPRATROPIUM BROMIDE AND ALBUTEROL SULFATE 1 DOSE: .5; 3 SOLUTION RESPIRATORY (INHALATION) at 12:18

## 2025-06-10 RX ADMIN — PREDNISONE 40 MG: 20 TABLET ORAL at 10:49

## 2025-06-10 RX ADMIN — DOCUSATE SODIUM 100 MG: 100 CAPSULE, LIQUID FILLED ORAL at 10:49

## 2025-06-10 RX ADMIN — PANTOPRAZOLE SODIUM 40 MG: 40 TABLET, DELAYED RELEASE ORAL at 05:47

## 2025-06-10 RX ADMIN — ESCITALOPRAM OXALATE 10 MG: 10 TABLET ORAL at 21:26

## 2025-06-10 RX ADMIN — CYCLOBENZAPRINE 10 MG: 10 TABLET, FILM COATED ORAL at 21:26

## 2025-06-10 RX ADMIN — IPRATROPIUM BROMIDE AND ALBUTEROL SULFATE 1 DOSE: .5; 3 SOLUTION RESPIRATORY (INHALATION) at 08:40

## 2025-06-10 RX ADMIN — HYDROCODONE BITARTRATE AND ACETAMINOPHEN 1 TABLET: 5; 325 TABLET ORAL at 18:47

## 2025-06-10 RX ADMIN — DOXYCYCLINE HYCLATE 100 MG: 100 TABLET, COATED ORAL at 10:52

## 2025-06-10 RX ADMIN — AZITHROMYCIN DIHYDRATE 250 MG: 250 TABLET ORAL at 10:52

## 2025-06-10 ASSESSMENT — PAIN DESCRIPTION - FREQUENCY: FREQUENCY: CONTINUOUS

## 2025-06-10 ASSESSMENT — PAIN DESCRIPTION - LOCATION
LOCATION: CHEST
LOCATION: CHEST

## 2025-06-10 ASSESSMENT — PAIN DESCRIPTION - ONSET: ONSET: ON-GOING

## 2025-06-10 ASSESSMENT — PAIN DESCRIPTION - ORIENTATION
ORIENTATION: MID
ORIENTATION: MID

## 2025-06-10 ASSESSMENT — PAIN SCALES - GENERAL
PAINLEVEL_OUTOF10: 5
PAINLEVEL_OUTOF10: 7
PAINLEVEL_OUTOF10: 0

## 2025-06-10 ASSESSMENT — PAIN - FUNCTIONAL ASSESSMENT
PAIN_FUNCTIONAL_ASSESSMENT: ACTIVITIES ARE NOT PREVENTED
PAIN_FUNCTIONAL_ASSESSMENT: ACTIVITIES ARE NOT PREVENTED

## 2025-06-10 ASSESSMENT — PAIN DESCRIPTION - DESCRIPTORS
DESCRIPTORS: BURNING
DESCRIPTORS: BURNING

## 2025-06-10 ASSESSMENT — PAIN DESCRIPTION - PAIN TYPE: TYPE: ACUTE PAIN

## 2025-06-10 NOTE — CARE COORDINATION
6/10/25, 10:57 AM EDT    DISCHARGE PLANNING EVALUATION    Spoke with patient and her .  Explained that [facility] is out of network with her insurance and there are not out of network benefits.  Discussed other in network options for snfs, including [facility], [facility], [facility] and [facility].   Patient requested referral to [facility] or [facility], referral initiated       [facility] has semi-private room only.  Referral also to [facility]

## 2025-06-10 NOTE — CARE COORDINATION
6/10/25, 1:51 PM EDT    DISCHARGE ON GOING EVALUATION    Milli [facility]LLANO [facility] day: 5  Location: -07/007-A Reason for admit: Closed fracture of medial portion of left tibial plateau, initial encounter [S82.132A]     Procedures:   6/9 Echo: EF of 55 - 60%     Imaging since last note:   6/8 PCXR: No acute cardiopulmonary finding..     Barriers to Discharge: NWB to RLE, requires SNF (precert), cont PT/OT. HR 44 bpm. Oral Zithromax, duo nebs. Last BM 6/8.    PCP: Garret Milner MD  Readmission Risk Score: 10.7    Patient Goals/Plan/Treatment Preferences: planning SNF: to [facility] or [facility] and SW assisting. Will be precert.

## 2025-06-10 NOTE — DISCHARGE INSTRUCTIONS
Non weight bearing left leg  Maintain knee brace, no knee range of motion, remove only for hygiene  Lovenox injections for dvt prevention  Follow up 2 week Dr Hanna

## 2025-06-10 NOTE — CARE COORDINATION
6/10/25, 7:24 AM EDT    DISCHARGE PLANNING EVALUATION    [facility], will need additional choices from patient and family

## 2025-06-11 PROBLEM — R94.31 ABNORMAL EKG: Status: ACTIVE | Noted: 2025-06-11

## 2025-06-11 PROBLEM — S82.142A CLOSED FRACTURE OF LEFT TIBIAL PLATEAU: Status: ACTIVE | Noted: 2025-06-11

## 2025-06-11 LAB
ANION GAP SERPL CALC-SCNC: 11 MEQ/L (ref 8–16)
BUN SERPL-MCNC: 24 MG/DL (ref 8–23)
CALCIUM SERPL-MCNC: 9.6 MG/DL (ref 8.8–10.2)
CHLORIDE SERPL-SCNC: 104 MEQ/L (ref 98–111)
CO2 SERPL-SCNC: 23 MEQ/L (ref 22–29)
CREAT SERPL-MCNC: 0.8 MG/DL (ref 0.5–0.9)
DEPRECATED RDW RBC AUTO: 45.5 FL (ref 35–45)
EKG ATRIAL RATE: 82 BPM
EKG P AXIS: 84 DEGREES
EKG Q-T INTERVAL: 426 MS
EKG QRS DURATION: 96 MS
EKG QTC CALCULATION (BAZETT): 497 MS
EKG R AXIS: 49 DEGREES
EKG T AXIS: 67 DEGREES
EKG VENTRICULAR RATE: 82 BPM
ERYTHROCYTE [DISTWIDTH] IN BLOOD BY AUTOMATED COUNT: 14 % (ref 11.5–14.5)
GFR SERPL CREATININE-BSD FRML MDRD: 76 ML/MIN/1.73M2
GLUCOSE SERPL-MCNC: 111 MG/DL (ref 74–109)
HCT VFR BLD AUTO: 40.7 % (ref 37–47)
HGB BLD-MCNC: 12.9 GM/DL (ref 12–16)
MAGNESIUM SERPL-MCNC: 2.1 MG/DL (ref 1.6–2.6)
MCH RBC QN AUTO: 28.1 PG (ref 26–33)
MCHC RBC AUTO-ENTMCNC: 31.7 GM/DL (ref 32.2–35.5)
MCV RBC AUTO: 88.7 FL (ref 81–99)
PHOSPHATE SERPL-MCNC: 3 MG/DL (ref 2.5–4.5)
PLATELET # BLD AUTO: 263 THOU/MM3 (ref 130–400)
PMV BLD AUTO: 9.1 FL (ref 9.4–12.4)
POTASSIUM SERPL-SCNC: 4.1 MEQ/L (ref 3.5–5.2)
RBC # BLD AUTO: 4.59 MILL/MM3 (ref 4.2–5.4)
SODIUM SERPL-SCNC: 138 MEQ/L (ref 135–145)
T4 FREE SERPL-MCNC: 1.2 NG/DL (ref 0.92–1.68)
TSH SERPL DL<=0.05 MIU/L-ACNC: 1.09 UIU/ML (ref 0.27–4.2)
WBC # BLD AUTO: 8.5 THOU/MM3 (ref 4.8–10.8)

## 2025-06-11 PROCEDURE — 6370000000 HC RX 637 (ALT 250 FOR IP): Performed by: STUDENT IN AN ORGANIZED HEALTH CARE EDUCATION/TRAINING PROGRAM

## 2025-06-11 PROCEDURE — 93010 ELECTROCARDIOGRAM REPORT: CPT | Performed by: INTERNAL MEDICINE

## 2025-06-11 PROCEDURE — 36415 COLL VENOUS BLD VENIPUNCTURE: CPT

## 2025-06-11 PROCEDURE — 6360000002 HC RX W HCPCS: Performed by: PHYSICIAN ASSISTANT

## 2025-06-11 PROCEDURE — 84439 ASSAY OF FREE THYROXINE: CPT

## 2025-06-11 PROCEDURE — 97530 THERAPEUTIC ACTIVITIES: CPT

## 2025-06-11 PROCEDURE — 97110 THERAPEUTIC EXERCISES: CPT

## 2025-06-11 PROCEDURE — 83735 ASSAY OF MAGNESIUM: CPT

## 2025-06-11 PROCEDURE — 6370000000 HC RX 637 (ALT 250 FOR IP): Performed by: INTERNAL MEDICINE

## 2025-06-11 PROCEDURE — 84443 ASSAY THYROID STIM HORMONE: CPT

## 2025-06-11 PROCEDURE — 80048 BASIC METABOLIC PNL TOTAL CA: CPT

## 2025-06-11 PROCEDURE — 84100 ASSAY OF PHOSPHORUS: CPT

## 2025-06-11 PROCEDURE — 97535 SELF CARE MNGMENT TRAINING: CPT

## 2025-06-11 PROCEDURE — 93005 ELECTROCARDIOGRAM TRACING: CPT | Performed by: STUDENT IN AN ORGANIZED HEALTH CARE EDUCATION/TRAINING PROGRAM

## 2025-06-11 PROCEDURE — 85027 COMPLETE CBC AUTOMATED: CPT

## 2025-06-11 PROCEDURE — 6370000000 HC RX 637 (ALT 250 FOR IP): Performed by: PHYSICIAN ASSISTANT

## 2025-06-11 PROCEDURE — 6360000002 HC RX W HCPCS: Performed by: INTERNAL MEDICINE

## 2025-06-11 PROCEDURE — 99232 SBSQ HOSP IP/OBS MODERATE 35: CPT | Performed by: STUDENT IN AN ORGANIZED HEALTH CARE EDUCATION/TRAINING PROGRAM

## 2025-06-11 PROCEDURE — 1200000000 HC SEMI PRIVATE

## 2025-06-11 PROCEDURE — 94640 AIRWAY INHALATION TREATMENT: CPT

## 2025-06-11 RX ORDER — CALCIUM CARBONATE 500 MG/1
500 TABLET, CHEWABLE ORAL 3 TIMES DAILY PRN
Status: DISCONTINUED | OUTPATIENT
Start: 2025-06-11 | End: 2025-06-12 | Stop reason: HOSPADM

## 2025-06-11 RX ADMIN — BUDESONIDE 500 MCG: 0.5 INHALANT RESPIRATORY (INHALATION) at 20:32

## 2025-06-11 RX ADMIN — HYDROCODONE BITARTRATE AND ACETAMINOPHEN 1 TABLET: 5; 325 TABLET ORAL at 01:58

## 2025-06-11 RX ADMIN — VARENICLINE TARTRATE 0.5 MG: 0.5 TABLET, FILM COATED ORAL at 09:59

## 2025-06-11 RX ADMIN — DOCUSATE SODIUM 100 MG: 100 CAPSULE, LIQUID FILLED ORAL at 09:59

## 2025-06-11 RX ADMIN — IPRATROPIUM BROMIDE AND ALBUTEROL SULFATE 1 DOSE: .5; 3 SOLUTION RESPIRATORY (INHALATION) at 20:32

## 2025-06-11 RX ADMIN — ANTACID TABLETS 500 MG: 500 TABLET, CHEWABLE ORAL at 13:34

## 2025-06-11 RX ADMIN — BUDESONIDE 500 MCG: 0.5 INHALANT RESPIRATORY (INHALATION) at 08:53

## 2025-06-11 RX ADMIN — DOXYCYCLINE HYCLATE 100 MG: 100 TABLET, COATED ORAL at 20:09

## 2025-06-11 RX ADMIN — DOXYCYCLINE HYCLATE 100 MG: 100 TABLET, COATED ORAL at 09:59

## 2025-06-11 RX ADMIN — PREDNISONE 40 MG: 20 TABLET ORAL at 09:59

## 2025-06-11 RX ADMIN — CYCLOBENZAPRINE 10 MG: 10 TABLET, FILM COATED ORAL at 20:09

## 2025-06-11 RX ADMIN — ENOXAPARIN SODIUM 40 MG: 100 INJECTION SUBCUTANEOUS at 20:11

## 2025-06-11 RX ADMIN — PANTOPRAZOLE SODIUM 40 MG: 40 TABLET, DELAYED RELEASE ORAL at 05:14

## 2025-06-11 RX ADMIN — AZITHROMYCIN DIHYDRATE 250 MG: 250 TABLET ORAL at 09:59

## 2025-06-11 RX ADMIN — HYDROCODONE BITARTRATE AND ACETAMINOPHEN 1 TABLET: 5; 325 TABLET ORAL at 20:09

## 2025-06-11 RX ADMIN — IPRATROPIUM BROMIDE AND ALBUTEROL SULFATE 1 DOSE: .5; 3 SOLUTION RESPIRATORY (INHALATION) at 08:49

## 2025-06-11 RX ADMIN — IPRATROPIUM BROMIDE AND ALBUTEROL SULFATE 1 DOSE: .5; 3 SOLUTION RESPIRATORY (INHALATION) at 11:30

## 2025-06-11 RX ADMIN — ESCITALOPRAM OXALATE 10 MG: 10 TABLET ORAL at 20:09

## 2025-06-11 ASSESSMENT — PAIN SCALES - GENERAL
PAINLEVEL_OUTOF10: 3
PAINLEVEL_OUTOF10: 5
PAINLEVEL_OUTOF10: 3
PAINLEVEL_OUTOF10: 0
PAINLEVEL_OUTOF10: 0
PAINLEVEL_OUTOF10: 5

## 2025-06-11 ASSESSMENT — PAIN DESCRIPTION - DESCRIPTORS
DESCRIPTORS: ACHING;DISCOMFORT;SORE
DESCRIPTORS: ACHING;DISCOMFORT;SORE

## 2025-06-11 ASSESSMENT — PAIN DESCRIPTION - LOCATION
LOCATION: KNEE
LOCATION: KNEE

## 2025-06-11 ASSESSMENT — PAIN DESCRIPTION - ORIENTATION
ORIENTATION: LEFT
ORIENTATION: LEFT

## 2025-06-11 ASSESSMENT — PAIN - FUNCTIONAL ASSESSMENT: PAIN_FUNCTIONAL_ASSESSMENT: PREVENTS OR INTERFERES SOME ACTIVE ACTIVITIES AND ADLS

## 2025-06-11 ASSESSMENT — PAIN DESCRIPTION - FREQUENCY: FREQUENCY: CONTINUOUS

## 2025-06-11 ASSESSMENT — PAIN DESCRIPTION - ONSET: ONSET: ON-GOING

## 2025-06-11 ASSESSMENT — PAIN DESCRIPTION - PAIN TYPE: TYPE: ACUTE PAIN

## 2025-06-11 NOTE — PROCEDURES
PROCEDURE NOTE  Date: 6/11/2025   Name: Milli Rice  YOB: 1950    Procedures  12 lead EKG completed. Results handed to Viviana JORDAN.

## 2025-06-11 NOTE — CARE COORDINATION
6/11/25, 10:31 AM EDT    DISCHARGE PLANNING EVALUATION    Snf options discussed with patient and family.  They are requesting [facility].  [facility] can accept and will start precert.

## 2025-06-11 NOTE — CARE COORDINATION
6/11/25, 1:54 PM EDT    DISCHARGE PLANNING EVALUATION    Precert is approved for [facility], transport is not available today, anticipate discharge tomorrow.

## 2025-06-12 ENCOUNTER — APPOINTMENT (OUTPATIENT)
Age: 75
DRG: 543 | End: 2025-06-12
Attending: STUDENT IN AN ORGANIZED HEALTH CARE EDUCATION/TRAINING PROGRAM
Payer: MEDICARE

## 2025-06-12 VITALS
HEIGHT: 62 IN | OXYGEN SATURATION: 95 % | RESPIRATION RATE: 20 BRPM | TEMPERATURE: 97.4 F | DIASTOLIC BLOOD PRESSURE: 52 MMHG | BODY MASS INDEX: 23.92 KG/M2 | HEART RATE: 70 BPM | WEIGHT: 130 LBS | SYSTOLIC BLOOD PRESSURE: 130 MMHG

## 2025-06-12 LAB
ANION GAP SERPL CALC-SCNC: 10 MEQ/L (ref 8–16)
BUN SERPL-MCNC: 23 MG/DL (ref 8–23)
CALCIUM SERPL-MCNC: 9.3 MG/DL (ref 8.8–10.2)
CHLORIDE SERPL-SCNC: 105 MEQ/L (ref 98–111)
CO2 SERPL-SCNC: 24 MEQ/L (ref 22–29)
CREAT SERPL-MCNC: 0.8 MG/DL (ref 0.5–0.9)
DEPRECATED RDW RBC AUTO: 45 FL (ref 35–45)
ECHO BSA: 1.61 M2
ERYTHROCYTE [DISTWIDTH] IN BLOOD BY AUTOMATED COUNT: 14 % (ref 11.5–14.5)
GFR SERPL CREATININE-BSD FRML MDRD: 76 ML/MIN/1.73M2
GLUCOSE SERPL-MCNC: 79 MG/DL (ref 74–109)
HCT VFR BLD AUTO: 36.5 % (ref 37–47)
HGB BLD-MCNC: 11.7 GM/DL (ref 12–16)
MCH RBC QN AUTO: 28.3 PG (ref 26–33)
MCHC RBC AUTO-ENTMCNC: 32.1 GM/DL (ref 32.2–35.5)
MCV RBC AUTO: 88.2 FL (ref 81–99)
PLATELET # BLD AUTO: 236 THOU/MM3 (ref 130–400)
PMV BLD AUTO: 9.1 FL (ref 9.4–12.4)
POTASSIUM SERPL-SCNC: 4.5 MEQ/L (ref 3.5–5.2)
RBC # BLD AUTO: 4.14 MILL/MM3 (ref 4.2–5.4)
SODIUM SERPL-SCNC: 139 MEQ/L (ref 135–145)
WBC # BLD AUTO: 7.5 THOU/MM3 (ref 4.8–10.8)

## 2025-06-12 PROCEDURE — 97530 THERAPEUTIC ACTIVITIES: CPT

## 2025-06-12 PROCEDURE — 94640 AIRWAY INHALATION TREATMENT: CPT

## 2025-06-12 PROCEDURE — 97110 THERAPEUTIC EXERCISES: CPT

## 2025-06-12 PROCEDURE — 36415 COLL VENOUS BLD VENIPUNCTURE: CPT

## 2025-06-12 PROCEDURE — 80048 BASIC METABOLIC PNL TOTAL CA: CPT

## 2025-06-12 PROCEDURE — 6370000000 HC RX 637 (ALT 250 FOR IP): Performed by: INTERNAL MEDICINE

## 2025-06-12 PROCEDURE — 97535 SELF CARE MNGMENT TRAINING: CPT

## 2025-06-12 PROCEDURE — 99232 SBSQ HOSP IP/OBS MODERATE 35: CPT | Performed by: STUDENT IN AN ORGANIZED HEALTH CARE EDUCATION/TRAINING PROGRAM

## 2025-06-12 PROCEDURE — 6370000000 HC RX 637 (ALT 250 FOR IP): Performed by: PHYSICIAN ASSISTANT

## 2025-06-12 PROCEDURE — 85027 COMPLETE CBC AUTOMATED: CPT

## 2025-06-12 PROCEDURE — 93246 EXT ECG>7D<15D RECORDING: CPT

## 2025-06-12 RX ORDER — IPRATROPIUM BROMIDE AND ALBUTEROL SULFATE 2.5; .5 MG/3ML; MG/3ML
1 SOLUTION RESPIRATORY (INHALATION) EVERY 4 HOURS PRN
Status: DISCONTINUED | OUTPATIENT
Start: 2025-06-12 | End: 2025-06-12 | Stop reason: HOSPADM

## 2025-06-12 RX ORDER — DOXYCYCLINE HYCLATE 100 MG
100 TABLET ORAL EVERY 12 HOURS SCHEDULED
DISCHARGE
Start: 2025-06-12 | End: 2025-06-15

## 2025-06-12 RX ADMIN — DOXYCYCLINE HYCLATE 100 MG: 100 TABLET, COATED ORAL at 08:43

## 2025-06-12 RX ADMIN — PANTOPRAZOLE SODIUM 40 MG: 40 TABLET, DELAYED RELEASE ORAL at 05:03

## 2025-06-12 RX ADMIN — VARENICLINE TARTRATE 0.5 MG: 0.5 TABLET, FILM COATED ORAL at 08:46

## 2025-06-12 RX ADMIN — IPRATROPIUM BROMIDE AND ALBUTEROL SULFATE 1 DOSE: .5; 3 SOLUTION RESPIRATORY (INHALATION) at 12:22

## 2025-06-12 RX ADMIN — PREDNISONE 40 MG: 20 TABLET ORAL at 08:43

## 2025-06-12 RX ADMIN — AZITHROMYCIN DIHYDRATE 250 MG: 250 TABLET ORAL at 08:43

## 2025-06-12 NOTE — PROGRESS NOTES
Hospitalist Consult Progress Note      Patient:  Milli Rice 74 y.o. female      Unit/Bed: 7-07/007-A  Date of service: 06/12/25      ASSESSMENT AND PLAN    Active Problems  COPD exacerbation/dyspnea: Improved. Patient received one-time dose of Solu-Medrol 40 Mg IV on 6/8/2025. Chest x-ray showed no pneumonia or vascular congestion. Continue prednisone 40 mg oral daily (end date 6/12/2025), doxycycline 100 mg oral twice daily (end date 6/15/2025), and home Zithromax 250 mg oral daily. Continue Pulmicort and DuoNebs. IS and Acapella.  Arrhythmia, PACs: Noted on EKG. Patient is not on any AV neftali blockers at home. Slight murmur noted on examination.  Echocardiogram from 6/9/2025 showed EF of 55 to 60%, LV size normal, normal wall thickness. Patient noted to be possibly in A-fib on telemetry however on EKG noted to have P waves. Low suspicion for ACS.  EKG from 6/12/2025 showed sinus rhythm with PACs. Plan for Holter monitor on discharge and outpatient follow-up. Continuous telemetry.  Left proximal tibial plateau fracture: Nonoperative management per primary team. PT/OT following. Continue to immobilizer. Patient to be discharged to SNF today.  Chronic Conditions (reviewed and stable unless otherwise stated)  History of asthma/COPD overlap syndrome: Patient follows with pulmonology at [facility] outpatient. Continue home azithromycin and inhalers.  Tobacco abuse: Attempting to quit, on Chantix. Will continue.        DVT prophylaxis: Lovenox per primary  Fluids: N/AA  Code Status: Full Code  PT/OT Eval Status: Following    Thank you, Arnoldo Hanna MD, for the consultation.  Hospitalist service will continue to follow along.      ===================================================================    Chief Complaint/Reason for Consult: Fall, medical management of shortness of breath    Hospital Course:   Per initial consult note \"Milli Rice is a 74 y.o. female who initially presented on 6/5 for a fall that 
    Hospitalist Consult Progress Note      Patient:  Milli Rice 74 y.o. female      Unit/Bed: 7K-07/007-A  Date of service: 06/11/25      ASSESSMENT AND PLAN    Active Problems  COPD exacerbation/dyspnea: Improving. Patient received one-time dose of Solu-Medrol 40 Mg IV on 6/8/2025. Chest x-ray showed no pneumonia or vascular congestion. Continue prednisone 40 mg oral daily (end date 6/12/2025), doxycycline 100 mg oral twice daily (end date 6/15/2025), and home Zithromax 250 mg oral daily. Continue Pulmicort and DuoNebs. IS and Acapella.  Bradyrhythmia: Noted on EKG. Patient is not on any AV neftali blockers at home. Slight murmur noted on examination.  Echocardiogram from 6/9/2025 showed EF of 55 to 60%, LV size normal, normal wall thickness. Patient noted to be possibly in A-fib on telemetry however on EKG noted to have P waves. Will order repeat EKG today. Low suspicion for ACS. Plan for Holter monitor on discharge and outpatient follow-up. Continuous telemetry.  Left proximal tibial plateau fracture: Nonoperative management per primary team. PT/OT following. Continue to immobilizer. Plan for SNF on discharge.  Chronic Conditions (reviewed and stable unless otherwise stated)  History of asthma/COPD overlap syndrome: Patient follows with pulmonology at [facility] outpatient. Continue home azithromycin and inhalers.  Tobacco abuse: Attempting to quit, on Chantix. Will continue.     DVT prophylaxis: Lovenox per primary  Fluids: N/A   Code Status: Full Code  PT/OT Eval Status: Following    Thank you, Arnoldo Hanna MD, for the consultation.  Hospitalist service will continue to follow along.      ===================================================================    Chief Complaint/Reason for Consult:  Fall, medical management  of shortness of breath     Hospital Course:   Per initial consult note \"Milli Rice is a 74 y.o. female who initially presented on 6/5 for a fall that she initially had sustained on 5/20. 
    Hospitalist Progress Note    Patient:  Milli Rice      Unit/Bed:7K-07/007-A    YOB: 1950    MRN: [id number]       Acct: [id number]     PCP: Garret Milner MD    Date of Admission: 6/5/2025    Assessment/Plan:    COPD Exacerbation / Dyspnea:   One-time dose of Solu-Medrol 40 mg 6/8/25  Continue Prednisone x 4 days for exacerbation  Doxycycline for COPD exacerbation  No pneumonia on CXR, vascular congestion  Fluids stopped.  No peripheral edema.  BNP mildly elevated at 569  Echo with preserved EF at 55-60%  Hold trelegy and replace with duonebs and budesonide for now  IS and acapella  6/9/25: Patient feels like she is breathing better today, no indication she requires additional diuresis at this time.    Bradyarrhythmia:  EKG reading A-fib today but not accurate, P waves present of various morphology, seems consistent with Wandering atrial pacemaker, irregular rhythm noted  Patient reported burning chest pain after eating consistent with GERD  GI cocktail ordered  Not on any AV neftali blockers  Continue telemetry  Echo with preserved EF estimated at 55-60%  Low clinical suspicion for ACS, no indication for troponin at this time    History of asthma/COPD overlap syndrome.  Follows pulmonology, see above    Left proximal tibial plateau fracture: Nonoperative management per primary  PT/OT eval and treat  DVT prophylaxis  Continue Knee Immobilizer    Constipation: Continue on bowel regimen    Tobacco abuse: Attempting to quit on Chantix we will continue.      Disposition:    [] Home       [] TCU       [] Rehab       [] Psych       [x] SNF       [] Long Term Care Facility       [] Other-    Chief Complaint: Fall      Subjective: 74 y.o. female admitted to the orthopedic surgery service for left proximal tibial plateau fracture.  Patient denies nausea or vomiting.  She endorses some shortness of breath but feels that this is better today.  She endorses coughing a lot.  Contacted by nursing later 
    Hospitalist Progress Note    Patient:  Milli Rice      Unit/Bed:7K-07/007-A    YOB: 1950    MRN: [id number]       Acct: [id number]     PCP: Garret Milner MD    Date of Admission: 6/5/2025    Assessment/Plan:    COPD Exacerbation / Dyspnea:   One-time dose of Solu-Medrol 40 mg 6/8/25  Continue Prednisone x 4 days for exacerbation  Doxycycline for COPD exacerbation  No pneumonia on CXR, vascular congestion  Fluids stopped.  No peripheral edema.  BNP mildly elevated at 569  Echo with preserved EF at 55-60%  Hold trelegy and replace with duonebs and budesonide for now  IS and acapella  6/9/25: Patient feels like she is breathing better today, no indication she requires additional diuresis at this time.    Sinus bradyarrhythmia on EKG  Not on any AV neftali blockers  Start on telemetry  Repeat EKG due to artifact  Echo with preserved EF estimated at 55-60%    History of asthma/COPD overlap syndrome.  Follows pulmonology, see above  Left proximal tibial plateau fracture: Nonoperative management per primary  PT/OT eval and treat  DVT prophylaxis  Continue Knee Immobilizer    Constipation: Continue on bowel regimen    Tobacco abuse: attempting to quit on Chantix we will continue.      Disposition:    [] Home       [] TCU       [] Rehab       [] Psych       [x] SNF       [] Long Term Care Facility       [] Other-    Chief Complaint: Fall      Subjective: 74 y.o. female admitted to the orthopedic surgery service for left proximal tibial plateau fracture.  Hospitalist service consulted for shortness of breath.  Patient reports she feels like she is breathing much better today.  She rates her pain a 3/10 in severity.  She endorses nausea but denies chest pain.  Knee immobilizer remains in place.      Medications:    Infusion Medications   Scheduled Medications    predniSONE  40 mg Oral Daily    doxycycline hyclate  100 mg Oral 2 times per day    ipratropium 0.5 mg-albuterol 2.5 mg  1 Dose Inhalation 
  Physician Progress Note      PATIENT:               HALEY CRUM  CSN #:                  [id number]  :                       1950  ADMIT DATE:       2025 8:50 AM  DISCH DATE:  RESPONDING  PROVIDER #:        Ale Negron PA-C          QUERY TEXT:    Please clarify whether the left tibia plateau fracture documented H&P by   Ale Negron PA-C at 2025 is related to a traumatic or   non-traumatic cause or following a minor injury that would not routinely break   a healthy bone:    The clinical indicators include:  This is a 74 -year-old white male who was presented to the hospital after a   fall L knee pain.pt had Severe persistent asthma.    -- pt had PMH Senile osteoporosis-(ED Provider Notes by Chen Valles PA-C   at 2025).    -\"closed treatment L tibia plateau fracture\"-(H&P by Ale Negron PA-C at 2025).    - XR femur reveled There is an intramedullary christal in the left femur.   Vascular calcifications are present. There is diffuse osteopenia.    -pt on acetaminophen (TYLENOL) tablet 650 mg from ([facility]), XR femur.      Thank you,  Izabel Ram-[facility] CDS  Options provided:  -- Traumatic left tibia plateau fracture  -- Pathological left tibia plateau fracture related to osteoporosis  -- Other - I will add my own diagnosis  -- Disagree - Not applicable / Not valid  -- Disagree - Clinically unable to determine / Unknown  -- Refer to Clinical Documentation Reviewer    PROVIDER RESPONSE TEXT:    The patient has a pathological left tibia plateau fracture related to   osteoporosis.    Query created by: Izabel Ram on 2025 2:19 AM      Electronically signed by:  Ale Negron PA-C 2025 6:10 AM          
 [facility]  INPATIENT PHYSICAL THERAPY  DAILY NOTE  [facility] ORTHOPEDICS 7K - 7K-07/007-A      Discharge Recommendations: Subacute/Skilled Nursing Facility  Equipment Recommendations: No (defer to next level of care)               Time In: 807  Time Out: 830  Timed Code Treatment Minutes: 23 Minutes  Minutes: 23          Date: 6/10/2025  Patient Name: Milli Rice,  Gender:  female        MRN: [id number]  : 1950  (74 y.o.)     Referring Practitioner: Ale Negron PA-C  Diagnosis: Closed fracture of medial portion of left tibial plateau, initial encounter  Additional Pertinent Hx: Patient presented with left knee pain after sustaining a fall on .  She was seen here on  with extensive imaging done however did not result in any acute findings.  She states last night she heard a pop in her leg and experiencing extreme pain and inability to bear weight/walk.  She then called squad to be seen.  Initial x-ray of the knee was unremarkable.  Reached out to Ortho MEAGAN Negron who recommended ordering a CT of the knee which did reveal a comminuted fracture of the intercondylar region of the tibia extending into the medial tibial plateau.  She also recommended getting an x-ray of the femur which did not have any acute findings at this time.  At this time Ortho will be admitting the patient for management of the fracture as well as patient's inability to bear weight and take care of herself at home due to not being able to walk.  Her  is also not able to assist in taking care of her at this time which makes inpatient mission with pain control and management more ideal.     Prior Level of Function:  Lives With: Spouse  Type of Home: Condo  Home Layout: One level  Home Access: Level entry  Home Equipment: Rollator   Bathroom Shower/Tub: Walk-in shower, Tub/Shower unit  Bathroom Toilet: Standard  Bathroom Equipment: Grab bars in shower, Shower chair  Bathroom 
 [facility]  INPATIENT PHYSICAL THERAPY  DAILY NOTE  [facility] ORTHOPEDICS 7K - 7K-07/007-A      Discharge Recommendations: Subacute/Skilled Nursing Facility  Equipment Recommendations: No (defer to next level of care)               Time In: 751  Time Out: 814  Timed Code Treatment Minutes: 23 Minutes  Minutes: 23          Date: 2025  Patient Name: Milli Rice,  Gender:  female        MRN: [id number]  : 1950  (74 y.o.)     Referring Practitioner: Ale Negron PA-C  Diagnosis: Closed fracture of medial portion of left tibial plateau, initial encounter  Additional Pertinent Hx: Patient presented with left knee pain after sustaining a fall on .  She was seen here on  with extensive imaging done however did not result in any acute findings.  She states last night she heard a pop in her leg and experiencing extreme pain and inability to bear weight/walk.  She then called squad to be seen.  Initial x-ray of the knee was unremarkable.  Reached out to Ortho MEAGAN Negron who recommended ordering a CT of the knee which did reveal a comminuted fracture of the intercondylar region of the tibia extending into the medial tibial plateau.  She also recommended getting an x-ray of the femur which did not have any acute findings at this time.  At this time Ortho will be admitting the patient for management of the fracture as well as patient's inability to bear weight and take care of herself at home due to not being able to walk.  Her  is also not able to assist in taking care of her at this time which makes inpatient mission with pain control and management more ideal.     Prior Level of Function:  Lives With: Spouse  Type of Home: Condo  Home Layout: One level  Home Access: Level entry  Home Equipment: Rollator   Bathroom Shower/Tub: Walk-in shower, Tub/Shower unit  Bathroom Toilet: Standard  Bathroom Equipment: Grab bars in shower, Shower chair  Bathroom 
[facility]  INPATIENT OCCUPATIONAL THERAPY  [facility] ORTHOPEDICS 7K  EVALUATION      Discharge Recommendations: 24 hour supervision or assist, Patient would benefit from continued therapy after discharge, Continue to assess pending progress  Equipment Recommendations: No        Time In: 919  Time Out: 941  Timed Code Treatment Minutes: 12 Minutes  Minutes: 22          Date: 2025  Patient Name: Milli Rice,   Gender: female      MRN: [id number]  : 1950  (74 y.o.)  Referring Practitioner: Ale Negron PA-C  Diagnosis: Closed fracture of medial portion of left tibial plateau, initial encounter  Additional Pertinent Hx: per chart review; \"74 y.o. female who presents with L knee pain  Sustained a fall on 25 where two days later sought care and imaging was negative for acute process. She has had continued difficulty with ambulation and mobilization, new onset pain last evening after a fall, did not hit head, continued L knee pain and effusion, feelings of instability. Pain is to the global L knee, worsened with palpation ROM and weight bearing, relieved by immobilization. No other msk concerns. No paresthesias or weakness otherwise\"    Restrictions/Precautions:  Restrictions/Precautions: Weight Bearing, Fall Risk, General Precautions  Left Lower Extremity Weight Bearing: Non Weight Bearing  Required Braces or Orthoses  Left Lower Extremity Brace: Knee Immobilizer    Subjective  Chart Reviewed: Yes, Orders, Progress Notes, History and Physical, Imaging  Patient assessed for rehabilitation services?: Yes  Family / Caregiver Present: Yes (spouse)    Subjective: RN approved session, patient seated up in bed with spouse present. patient agreeable to eval. demo heavy breathing and appeared lethargic. stated she was tired and had received pain meds. patient A & O x 3.    Pain: 3-/10: L LE in KI. Minimal facial grimacing.     Vitals: Vitals not assessed per clinical judgement, see 
Attempted to reach out to Leila modi to update her that the patient's medications are in her shoes and the patient will have an event monitor but no answer at the nursing home after three consecutive calls  
Echo completed. Dr. Isbell to read.   
[facility]  [facility] ORTHOPEDICS 7K  Occupational Therapy  Daily Note    Discharge Recommendations: Subacute/skilled nursing facility  Equipment Recommendations: No        Time In: 1359  Time Out: 1424  Timed Code Treatment Minutes: 25 Minutes  Minutes: 25          Date: 2025  Patient Name: Milli Rice,   Gender: female      Room: -07/007-A  MRN: [id number]  : 1950  (74 y.o.)  Referring Practitioner: Ale Negorn PA-C  Diagnosis: Closed fracture of medial portion of left tibial plateau, initial encounter  Additional Pertinent Hx: per chart review; \"74 y.o. female who presents with L knee pain  Sustained a fall on 25 where two days later sought care and imaging was negative for acute process. She has had continued difficulty with ambulation and mobilization, new onset pain last evening after a fall, did not hit head, continued L knee pain and effusion, feelings of instability. Pain is to the global L knee, worsened with palpation ROM and weight bearing, relieved by immobilization. No other msk concerns. No paresthesias or weakness otherwise\"    Restrictions/Precautions:  Restrictions/Precautions: Weight Bearing, Fall Risk, General Precautions  Left Lower Extremity Weight Bearing: Non Weight Bearing  Required Braces or Orthoses  Left Lower Extremity Brace: Knee Immobilizer     Social/Functional History:  Lives With: Spouse  Type of Home: Condo  Home Layout: One level  Home Access: Level entry  Home Equipment: Rollator   Bathroom Shower/Tub: Walk-in shower, Tub/Shower unit  Bathroom Toilet: Standard  Bathroom Equipment: Grab bars in shower, Shower chair  Bathroom Accessibility: Walker accessible    Receives Help From:  (spouse had previous stroke, resulting in R sided deficits making him unable to physically assist. Pt has 2 daughters that both live 3-4 hours away.)  Prior Level of Assist for ADLs: Independent  Prior Level of Assist for Homemaking: Independent  Prior Level 
Ortho Phone Rounds  Spoke to JULIAN Castillo    S:Pain controlled with 1 Norco per day and PRN Tylenol.  Patient reluctant to go to ECF for skilled rehab.   O: VSS  Max 2 assist to mobilize  A/P:   Multimodal pain control  Dvt ppx  PT/OT - NWB LLE. KI in place. No ROM left knee.  Medical management per Hospitalist  Discharge planning  Non-operative management of left proximal tibia fracture.   Follow up OP OIO   
Orthopaedic Progress Note      SUBJECTIVE     Ms. Rice is hospital day 2, L tibia plateau fx    Seen at bedside this morning  no adverse events overnight  Pain appears well controlled, tolerating oral diet  Denies any numbness/paresthesia to LLE  KI intact without skin breakdown      OBJECTIVE      Physical    VITALS:  BP (!) 140/63   Pulse 58   Temp 97.8 °F (36.6 °C) (Oral)   Resp 18   Ht 1.575 m (5' 2\")   Wt 59 kg (130 lb)   SpO2 97%   BMI 23.78 kg/m²   No intake/output data recorded.    4/10 pain  Gen: alert and oriented  Head: normorcephalic, atraumatic  Resp: unlabored, room air  Pelvis: stable  LLE KI without skin breakdown, gastroc ta ehl intact, effusion stable, SILT, foot warm well perfused, compartments soft and compressible       Data  CBC:   Lab Results   Component Value Date/Time    WBC 8.1 06/05/2025 10:57 AM    HGB 13.3 06/05/2025 10:57 AM     06/05/2025 10:57 AM     BMP:    Lab Results   Component Value Date/Time     06/05/2025 10:57 AM    K 4.8 06/05/2025 10:57 AM    K 4.7 03/11/2022 06:09 AM     06/05/2025 10:57 AM    CO2 23 06/05/2025 10:57 AM    BUN 15 06/05/2025 10:57 AM    CREATININE 0.8 06/05/2025 10:57 AM    CALCIUM 9.3 06/05/2025 10:57 AM    GLUCOSE 81 06/05/2025 10:57 AM     Uric Acid:  No components found for: \"URIC\"  PT/INR:    Lab Results   Component Value Date/Time    INR 1.02 03/03/2022 04:07 PM     Troponin:  No results found for: \"TROPONINI\"  Urine Culture:  No components found for: \"CURINE\"      Current Inpatient Medications    Current Facility-Administered Medications: 0.9 % sodium chloride infusion, , IntraVENous, Continuous  potassium chloride (KLOR-CON M) extended release tablet 40 mEq, 40 mEq, Oral, PRN **OR** potassium bicarb-citric acid (EFFER-K) effervescent tablet 40 mEq, 40 mEq, Oral, PRN **OR** potassium chloride 10 mEq/100 mL IVPB (Peripheral Line), 10 mEq, IntraVENous, PRN  magnesium sulfate 2000 mg in 50 mL IVPB premix, 2,000 mg, 
Orthopaedic Progress Note      SUBJECTIVE     Ms. Rice is hospital day 7, L tibia plateau fx    Seen at bedside this morning  Pain appears well controlled, tolerating oral diet  Denies any numbness/paresthesia to LLE  KI intact without skin breakdown  +BM  Sob improved  Overall doing very well       OBJECTIVE      Physical    VITALS:  BP (!) 143/61   Pulse 65   Temp 97.9 °F (36.6 °C) (Oral)   Resp 14   Ht 1.575 m (5' 2\")   Wt 59 kg (130 lb)   SpO2 96%   BMI 23.78 kg/m²   I/O last 3 completed shifts:  In: 1280 [P.O.:1280]  Out: 675 [Urine:675]    2/10 pain  Gen: alert and oriented  Head: normorcephalic, atraumatic  Resp: unlabored, room air  Pelvis: stable  LLE KI without skin breakdown, gastroc ta ehl intact, effusion stable, SILT, foot warm well perfused, compartments soft and compressible       Data  CBC:   Lab Results   Component Value Date/Time    WBC 8.8 06/10/2025 07:54 AM    HGB 12.8 06/10/2025 07:54 AM     06/10/2025 07:54 AM     BMP:    Lab Results   Component Value Date/Time     06/10/2025 07:54 AM    K 4.0 06/10/2025 07:54 AM    K 4.5 06/06/2025 07:07 AM     06/10/2025 07:54 AM    CO2 24 06/10/2025 07:54 AM    BUN 20 06/10/2025 07:54 AM    CREATININE 0.8 06/10/2025 07:54 AM    CALCIUM 9.5 06/10/2025 07:54 AM    GLUCOSE 83 06/10/2025 07:54 AM     Uric Acid:  No components found for: \"URIC\"  PT/INR:    Lab Results   Component Value Date/Time    INR 1.02 03/03/2022 04:07 PM     Troponin:  No results found for: \"TROPONINI\"  Urine Culture:  No components found for: \"CURINE\"      Current Inpatient Medications    Current Facility-Administered Medications: albuterol sulfate HFA (PROVENTIL;VENTOLIN;PROAIR) 108 (90 Base) MCG/ACT inhaler 2 puff, 2 puff, Inhalation, Q6H PRN  acetaminophen (TYLENOL) tablet 650 mg, 650 mg, Oral, Q6H PRN  predniSONE (DELTASONE) tablet 40 mg, 40 mg, Oral, Daily  doxycycline hyclate (VIBRA-TABS) tablet 100 mg, 100 mg, Oral, 2 times per day  ipratropium 0.5 
Orthopaedic Progress Note      SUBJECTIVE     Ms. Rice is hospital day 8, L tibia plateau fx    Seen at bedside this morning  Pain appears well controlled, tolerating oral diet  Denies any numbness/paresthesia to LLE  KI intact without skin breakdown, well padded  +BM  Sob improved  Overall doing very well       OBJECTIVE      Physical    VITALS:  BP (!) 124/56   Pulse 69   Temp 98.3 °F (36.8 °C) (Oral)   Resp 16   Ht 1.575 m (5' 2\")   Wt 59 kg (130 lb)   SpO2 95%   BMI 23.78 kg/m²   I/O last 3 completed shifts:  In: 1720 [P.O.:1720]  Out: 975 [Urine:975]    2/10 pain  Gen: alert and oriented  Head: normorcephalic, atraumatic  Resp: unlabored, room air  Pelvis: stable  LLE KI without skin breakdown, gastroc ta ehl intact, effusion stable, SILT, foot warm well perfused, compartments soft and compressible       Data  CBC:   Lab Results   Component Value Date/Time    WBC 8.5 06/11/2025 08:45 AM    HGB 12.9 06/11/2025 08:45 AM     06/11/2025 08:45 AM     BMP:    Lab Results   Component Value Date/Time     06/11/2025 08:45 AM    K 4.1 06/11/2025 08:45 AM    K 4.5 06/06/2025 07:07 AM     06/11/2025 08:45 AM    CO2 23 06/11/2025 08:45 AM    BUN 24 06/11/2025 08:45 AM    CREATININE 0.8 06/11/2025 08:45 AM    CALCIUM 9.6 06/11/2025 08:45 AM    GLUCOSE 111 06/11/2025 08:45 AM     Uric Acid:  No components found for: \"URIC\"  PT/INR:    Lab Results   Component Value Date/Time    INR 1.02 03/03/2022 04:07 PM     Troponin:  No results found for: \"TROPONINI\"  Urine Culture:  No components found for: \"CURINE\"      Current Inpatient Medications    Current Facility-Administered Medications: calcium carbonate (TUMS) chewable tablet 500 mg, 500 mg, Oral, TID PRN  albuterol sulfate HFA (PROVENTIL;VENTOLIN;PROAIR) 108 (90 Base) MCG/ACT inhaler 2 puff, 2 puff, Inhalation, Q6H PRN  acetaminophen (TYLENOL) tablet 650 mg, 650 mg, Oral, Q6H PRN  predniSONE (DELTASONE) tablet 40 mg, 40 mg, Oral, Daily  doxycycline 
Patient discharged to [facility]. Nurse Leila educated on discharge instructions, follow ups and medications. No questions or concerns voiced.   
Pt admitted to  7K7 from ED and via cart/stretcher.     Complaints: left tibial fracture.      IV normal saline infusing into the antecubital right, condition patent and no redness at a rate of 100 mls/ hour with about 900 mls in the bag still. IV site free of s/s of infection or infiltration. Vital signs obtained. Assessment and data collection initiated.     Two nurse skin assessment performed by Patti SHAHID RN and Chauncey FRANCISCO RN. Oriented to room.     Explained patients right to have family, representative or physician notified of their admission.  Patient has Declined for physician to be notified.  Patient has Declined for family/representative to be notified.    The patient is interested in [facility] meds to beds program?:  Yes    Policies and procedures for  explained. All questions answered with no further questions at this time. Fall prevention discussed with patient.  Bed alarm on. Call light in reach.        
SBIRT screening completed per trauma protocol. SBIRT Findings are Negative.  Patient denies alcohol and/or drug use. Also denies depressive symptoms.    Brief Intervention and Referral to Treatment Summary N/A    Patient was provided PHQ-9, AUDIT-C and DAST Screening:      PHQ-9 Score:  Negative  AUDIT-C Score:  Negative  DAST Score:   Negative    Patient’s substance use is considered     Negative      Patient’s depression is considered:     Minimal    Brief Education Was Provided    Not applicable as results are negative.      Brief Intervention(s) Provided  at time of screening(Only for AUDIT-C or DAST)     Not applicable as results are negative.    Injured Trauma Survivor Screening  1.  When you were injured or right afterward   Did you think you were going to die? RESPONSES; YES+1/NO: NO  Do you think this was done to you intentionally? NO    Since your injury  Have you felt more restless, tense or jumpy than usual? RESPONSES; YES+1/NO: YES  +1  Have you found yourself unable to stop worrying? RESPONSES; YES+1/NO: NO  Do you find yourself thinking that the world is unsafe and that people are not to be trusted? RESPONSES; YES+1/NO: NO    TOTAL SCORE from ITSS Questions 1 and 2:   1  NOTE: A score of greater than or equal to 2 is considered positive for PTSD risk and is to receive a community resource packet to link with appropriate providers.    Recommendations/Referrals for Brief and/or Specialized Treatment Provided to Patient  N/A      
Spiritual Health History and Assessment/Progress Note  [facility]    (P) Initial Encounter,  ,  ,      Name: Milli Rice MRN: [id number]    Age: 74 y.o.     Sex: female   Language: English   Holiness: Confucianist   Closed fracture of medial portion of left tibial plateau, initial encounter     Date: 6/9/2025            Total Time Calculated: (P) 6 min              Spiritual Assessment began in [facility]        Referral/Consult From: (P) Rounding   Encounter Overview/Reason: (P) Initial Encounter  Service Provided For: (P) Patient and family together    [place], Belief, Meaning:   Patient identifies as spiritual  Family/Friends identify as spiritual      Importance and Influence:  Patient has spiritual/personal beliefs that influence decisions regarding their health  Family/Friends have spiritual/personal beliefs that influence decisions regarding the patient's health    Community:  Patient Other: None  Family/Friends Other: None    Assessment and Plan of Care:     Patient Interventions include: Facilitated expression of thoughts and feelings  Family/Friends Interventions include: Facilitated expression of thoughts and feelings    Patient Plan of Care: Spiritual Care available upon further referral  Family/Friends Plan of Care: Spiritual Care available upon further referral    Electronically signed by JD Restrepo on 6/9/2025 at 5:25 PM   
06/06/2025 07:07 AM    MONOSABS 0.5 06/06/2025 07:07 AM    LYMPHSABS 1.7 06/06/2025 07:07 AM    EOSABS 0.1 06/06/2025 07:07 AM    BASOSABS 0.0 06/06/2025 07:07 AM     BMP:    Lab Results   Component Value Date/Time     06/06/2025 07:07 AM    K 4.5 06/06/2025 07:07 AM     06/06/2025 07:07 AM    CO2 18 06/06/2025 07:07 AM    BUN 12 06/06/2025 07:07 AM    CREATININE 0.7 06/06/2025 07:07 AM    CALCIUM 8.8 06/06/2025 07:07 AM    LABGLOM 90 06/06/2025 07:07 AM    LABGLOM >60 01/29/2024 11:07 AM    GLUCOSE 79 06/06/2025 07:07 AM     Rads:   Radiological Procedure reviewed.  Signed by: CHAYO Salazar        
Level of Assist for Transfers: Independent  Prior Level of Assist for Ambulation: Independent household ambulator, with or without device  Has the patient had two or more falls in the past year or any fall with injury in the past year?: Yes    Active : No  Patient's  Info: spouse  Occupation: Retired  Additional Comments: patient used no AD prior to admit.    SUBJECTIVE:RN okayed OT session.  Pt. In room and agreeable to participate.  Spouse present.     PAIN: no pain voiced    Vitals: Vitals not assessed per clinical judgement, see nursing flowsheet    COGNITION: Slow Processing, Decreased Problem Solving, and Decreased Safety Awareness    ADL:   Toilet Transfer: Minimal Assistance, X 1, with set-up, with verbal cues , and with increased time for completion. To from BSC from the recliner .    IADL:   Not Tested    BED MOBILITY:  Not Tested    TRANSFERS:  Sit to Stand:  Contact Guard Assistance, X 1, with increased time for completion, cues for hand placement.    Stand to Sit: Contact Guard Assistance, X 1, with increased time for completion, cues for hand placement.    Stand Pivot: Minimal Assistance, X 1, with increased time for completion, cues for hand placement. Demo good adherence to NWB on LLE.     ADDITIONAL ACTIVITIES:  Therapeutic Exercise:    Addressed increasing patient's UB strength through completion of upper extremity  theraband exercises using  resistance band. Pt completed pull aparts, shoulder flexion, elbow extension, horizontal  abduction and chest press x12 reps each while seated for increased strength and overall activity tolerance to support ADLs.   .    Functional Outcome Measures:   AM-PAC Inpatient Daily Activity Raw Score: 17     Modified [place]:  Current Functional Status:  Not Applicable    Education:  Learners: Patient  ADL's, Home Exercise Program, Precautions:  , Importance of Increasing Activity, Fall Prevention, and Assistive Device Safety    ASSESSMENT:     Activity 
mg-albuterol 2.5 mg (DUONEB) nebulizer solution 1 Dose, 1 Dose, Inhalation, Q4H WA RT  budesonide (PULMICORT) nebulizer suspension 500 mcg, 0.5 mg, Nebulization, BID RT  polyethylene glycol (GLYCOLAX) packet 17 g, 17 g, Oral, Daily  varenicline (CHANTIX) tablet 0.5 mg ++ Patient Supplied ++ (Patient Supplied), 0.5 mg, Oral, Daily  azithromycin (ZITHROMAX) tablet 250 mg, 250 mg, Oral, Daily  cyclobenzaprine (FLEXERIL) tablet 10 mg, 10 mg, Oral, Nightly  [Held by provider] fluticasone-umeclidin-vilant (TRELEGY ELLIPTA) 200-62.5-25 MCG/ACT inhaler 1 puff (Patient Supplied), 1 puff, Inhalation, Daily RT  potassium chloride (KLOR-CON M) extended release tablet 40 mEq, 40 mEq, Oral, PRN **OR** potassium bicarb-citric acid (EFFER-K) effervescent tablet 40 mEq, 40 mEq, Oral, PRN **OR** potassium chloride 10 mEq/100 mL IVPB (Peripheral Line), 10 mEq, IntraVENous, PRN  magnesium sulfate 2000 mg in 50 mL IVPB premix, 2,000 mg, IntraVENous, PRN  ondansetron (ZOFRAN-ODT) disintegrating tablet 4 mg, 4 mg, Oral, Q8H PRN **OR** ondansetron (ZOFRAN) injection 4 mg, 4 mg, IntraVENous, Q6H PRN  enoxaparin (LOVENOX) injection 40 mg, 40 mg, SubCUTAneous, Q24H  docusate sodium (COLACE) capsule 100 mg, 100 mg, Oral, BID  senna (SENOKOT) tablet 8.6 mg, 1 tablet, Oral, Nightly  pantoprazole (PROTONIX) tablet 40 mg, 40 mg, Oral, QAM AC  escitalopram (LEXAPRO) tablet 10 mg, 10 mg, Oral, Nightly  HYDROcodone-acetaminophen (NORCO) 5-325 MG per tablet 1 tablet, 1 tablet, Oral, Q4H PRN **OR** HYDROcodone-acetaminophen (NORCO) 5-325 MG per tablet 2 tablet, 2 tablet, Oral, Q4H PRN        PLAN    Ms. Rice is hospital day 5, L tibia plateau fx    NWB LLE  Maintain KI, no knee ROM, open for hygiene  Lovenox  Bowel regimen  Pulmonary hygiene  Hospitalist following, appreciate recs  Wean narcotics   Sleep hygiene  PT OT   Dispo- SNF, anticipate medically stable as early as tomorrow     
nebulizer solution 1 Dose, 1 Dose, Inhalation, Q4H WA RT  budesonide (PULMICORT) nebulizer suspension 500 mcg, 0.5 mg, Nebulization, BID RT  polyethylene glycol (GLYCOLAX) packet 17 g, 17 g, Oral, Daily  varenicline (CHANTIX) tablet 0.5 mg ++ Patient Supplied ++ (Patient Supplied), 0.5 mg, Oral, Daily  azithromycin (ZITHROMAX) tablet 250 mg, 250 mg, Oral, Daily  cyclobenzaprine (FLEXERIL) tablet 10 mg, 10 mg, Oral, Nightly  [Held by provider] fluticasone-umeclidin-vilant (TRELEGY ELLIPTA) 200-62.5-25 MCG/ACT inhaler 1 puff (Patient Supplied), 1 puff, Inhalation, Daily RT  potassium chloride (KLOR-CON M) extended release tablet 40 mEq, 40 mEq, Oral, PRN **OR** potassium bicarb-citric acid (EFFER-K) effervescent tablet 40 mEq, 40 mEq, Oral, PRN **OR** potassium chloride 10 mEq/100 mL IVPB (Peripheral Line), 10 mEq, IntraVENous, PRN  magnesium sulfate 2000 mg in 50 mL IVPB premix, 2,000 mg, IntraVENous, PRN  ondansetron (ZOFRAN-ODT) disintegrating tablet 4 mg, 4 mg, Oral, Q8H PRN **OR** ondansetron (ZOFRAN) injection 4 mg, 4 mg, IntraVENous, Q6H PRN  enoxaparin (LOVENOX) injection 40 mg, 40 mg, SubCUTAneous, Q24H  docusate sodium (COLACE) capsule 100 mg, 100 mg, Oral, BID  senna (SENOKOT) tablet 8.6 mg, 1 tablet, Oral, Nightly  pantoprazole (PROTONIX) tablet 40 mg, 40 mg, Oral, QAM AC  escitalopram (LEXAPRO) tablet 10 mg, 10 mg, Oral, Nightly  HYDROcodone-acetaminophen (NORCO) 5-325 MG per tablet 1 tablet, 1 tablet, Oral, Q4H PRN **OR** HYDROcodone-acetaminophen (NORCO) 5-325 MG per tablet 2 tablet, 2 tablet, Oral, Q4H PRN        PLAN    Ms. Rice is hospital day 6, L tibia plateau fx    NWB LLE  Maintain KI, no knee ROM, open for hygiene  Lovenox  Bowel regimen  Pulmonary hygiene  Labs today   Hospitalist following, appreciate recs, echo completed, pulm symptoms improved   Wean narcotics   Sleep hygiene  PT OT   Dispo- SNF, clinical improvement, medically stable     
rehabilitation services?: Yes  Family/Caregiver Present: No  Subjective: RN approved PT session. Pt in bed and agreeable to evaluation. Pt reporting she just recieved pain meds and would like to try to get to the chair.    General:  Overall Orientation Status: Within Normal Limits  Overall Cognitive Status: WNL  Vision: Impaired  Vision Exceptions: Wears glasses at all times  Hearing: Within functional limits       Pain: 3/10:     Vitals: Vitals not assessed per clinical judgement, see nursing flowsheet    Social/Functional History:    Lives With: Spouse  Type of Home: Condo  Home Layout: One level  Home Access: Level entry  Home Equipment: Rollator     Bathroom Shower/Tub: Walk-in shower, Tub/Shower unit  Bathroom Toilet: Standard  Bathroom Equipment: Grab bars in shower, Shower chair  Bathroom Accessibility: Walker accessible    Receives Help From:  (spouse had previous stroke, resulting in R sided deficits making him unable to physically assist. Pt has 2 daughters that both live 3-4 hours away.)  Prior Level of Assist for ADLs: Independent  Prior Level of Assist for Homemaking: Independent  Prior Level of Assist for Transfers: Independent  Prior Level of Assist for Ambulation: Independent household ambulator, with or without device  Has the patient had two or more falls in the past year or any fall with injury in the past year?: Yes    Active : No  Occupation: Retired  Additional Comments: patient used no AD prior to admit.    OBJECTIVE:  Range of Motion:  Right Lower Extremity: WFL  Left Lower Extremity: Impaired - no ROM LLE    Strength:  Right Lower Extremity: WFL  Left Lower Extremity: Not Tested    Balance:  Static Sitting Balance:  Stand By Assistance  Static Standing Balance: Contact Guard Assistance    Bed Mobility:  Supine to Sit: Minimal Assistance, with head of bed raised, with rail, with verbal cues , with increased time for completion    Transfers:  Sit to Stand: Minimal Assistance, X 1, cues 
Assistance    Exercise:  None due to needing Echo     Functional Outcome Measures:  [facility] (6 CLICK) BASIC MOBILITY     AM-PAC Inpatient Mobility without Stair Climbing Raw Score : 11  AM-PAC Inpatient without Stair Climbing T-Scale Score : 35.66     Modified [place]:  Current Functional Status:  Not Applicable    ASSESSMENT:  Assessment: Patient progressing toward established goals.  Activity Tolerance:  Patient tolerance of  treatment:Good.  Plan: Current Treatment Recommendations: Strengthening, Balance training, Functional mobility training, Transfer training, Manual, Gait training, Wheelchair mobility training, Pain management, Home exercise program, Safety education & training, Patient/Caregiver education & training, Equipment evaluation, education, & procurement, Modalities, Positioning, Therapeutic activities  General Plan:  (6x O)    Education:  Learners: Patient  Patient Education: Plan of Care, Bed Mobility, Transfers, Gait,  - Patient Verbalized Understanding, - Patient Requires Continued Education    Goals:  Patient Goals : go home safely  Short Term Goals  Time Frame for Short Term Goals: by hospital discharge  Short Term Goal 1: Pt will complete supine <-> sit mod I for ease of getting in and out of bed.  Short Term Goal 2: Pt will amb for 3 feet with CGA and RW with good demo of WB status to progress with mobility.  Short Term Goal 3: Pt will complete sit to stand transfer with LRAD and CGA with good demo of WB status for safe transfers from any surface  Short Term Goal 4: Pt will propel WC for 75' S for improved mobility throughout environment  Short Term Goal 5: Pt will demo stand pivot transfers with CGA with LRAD and good demo of WB status to progress with mobility.  Long Term Goals  Time Frame for Long Term Goals : n/a due to short ELOS    Following session, patient left in safe position in bed, with alarm, and call light within reach        
Daily Activity Raw Score: 17     Modified [place]:  Current Functional Status:  Not Applicable    Education:  Learners: Patient  ADL's, Home Exercise Program, Precautions:  , Importance of Increasing Activity, Fall Prevention, and Assistive Device Safety    ASSESSMENT:     Activity Tolerance:  Patient tolerance of  treatment: Good treatment tolerance      Plan: Times Per Week: 5-6x  Times Per Day: Once a day  Current Treatment Recommendations: Strengthening, Balance training, Functional mobility training, Endurance training, Neuromuscular re-education, Pain management, Self-Care / ADL, Positioning, Patient/Caregiver education & training, Safety education & training, Coordination training    Goals  Short Term Goals  Time Frame for Short Term Goals: by discharge  Short Term Goal 1: patient will tolerate 5 min functional standing with CGA within L LE precautions to increase ease with toileting.  Short Term Goal 2: patient will progress to stand pivot transfers and functional mobility within precautions and create goal as approrpiate.  Short Term Goal 3: patient will complete EOB ADLs with SBA and use of AE PRN.  Short Term Goal 4: patient will complete toileting with MIN A.  Short Term Goal 5: patient will demo >/= 4+/5 (B) UE strength to increase ease with functional transfers.    Following session, patient left in safe position in bed, with alarm, and call light within reach       
Pain management, Home exercise program, Safety education & training, Patient/Caregiver education & training, Equipment evaluation, education, & procurement, Modalities, Positioning, Therapeutic activities  General Plan:  (6x O)    Education:  Learners: Patient and Significant Other  Patient Education: Plan of Care, Precautions/Restrictions, Bed Mobility, Equipment Education, Transfers, Gait, Use of Gait Belt, Wheelchair Mobility,  - Patient Verbalized Understanding, - Patient Requires Continued Education    Goals:  Patient Goals : go home safely  Short Term Goals  Time Frame for Short Term Goals: by hospital discharge  Short Term Goal 1: Pt will complete supine <-> sit mod I for ease of getting in and out of bed.  Short Term Goal 2: Pt will complete bed <-> chair transfer with LRAD and CGA for improved functional mobility  Short Term Goal 3: Pt will complete sit to stand transfer with LRAD and CGA for safe transfers from any surface  Short Term Goal 4: Pt will propel WC for 150' mod I for improved mobility throughout environment  Long Term Goals  Time Frame for Long Term Goals : n/a due to short ELOS    Revised Short-Term Goals:    Short Term Goals  Time Frame for Short Term Goals: by hospital discharge  Short Term Goal 1: Pt will complete supine <-> sit mod I for ease of getting in and out of bed.  Short Term Goal 2: Pt will amb for 3 feet with CGA and RW with good demo of WB status to progress with mobility.  Short Term Goal 3: Pt will complete sit to stand transfer with LRAD and CGA with good demo of WB status for safe transfers from any surface  Short Term Goal 4: Pt will propel WC for 75' S for improved mobility throughout environment  Short Term Goal 5: Pt will demo stand pivot transfers with CGA with LRAD and good demo of WB status to progress with mobility.    Following session, patient left in safe position. Pt in wheelchair following session, all needs and call light in reach, alarm on.          
toileting with MIN A.  Short Term Goal 5: patient will demo >/= 4+/5 (B) UE strength to increase ease with functional transfers.    Following session, patient left in safe position in chair, with alarm, and call light within reach       
with CGA and RW with good demo of WB status to progress with mobility.  Short Term Goal 3: Pt will complete sit to stand transfer with LRAD and CGA with good demo of WB status for safe transfers from any surface  Short Term Goal 4: Pt will propel WC for 75' S for improved mobility throughout environment  Short Term Goal 5: Pt will demo stand pivot transfers with CGA with LRAD and good demo of WB status to progress with mobility.  Long Term Goals  Time Frame for Long Term Goals : n/a due to short ELOS    Following session, patient left in safe position in chair, with alarm, and call light within reach

## 2025-06-12 NOTE — DISCHARGE SUMMARY
Physician Discharge Summary     Patient ID:  Milli Rice  [id number]  74 y.o.  1950    Admit date: 6/5/2025    Discharge date and time: 6/12/25    Admitting Physician: Arnoldo Hanna MD     Discharge Physician: Arnoldo Hanna MD     Admission Diagnoses: Closed fracture of medial portion of left tibial plateau, initial encounter [S82.132A]    Discharge Diagnoses: Closed fracture of medial portion of left tibial plateau, initial encounter [S82.132A]    Admission Condition: fair    Discharged Condition: fair    Indication for Admission: unstable orthopaedic injury, pain control, mobilization     Hospital Course: Patient presented to the ED on 6/5/25 after sustaining a fall resulting in a tibia plateau fracture of the LLE. Surgical intervention was not recommended however patient was unable to mobilize and her pain control was tenuous so she was admitted. Hospitalist was medication reconciliation, and medical management of her COPD.  Patient was started on tylenol and norco for pain control and lovenox for dvt prophylaxis. Patient advanced diet and there were no adverse events on the floor. Patient was found to be stable and suitable for discharge with consulting services. On the day of discharge, patient was afebrile with stable vital signs, stable upon physical exam. Patient was discharged with prescriptions for pain and dvt ppx. Patient was deemed stable for discharge to SNF as recommended by PT/OT. Patient will follow up with Dr Hanna in 2 weeks for post-operative visit.       Consults: hospitalist     Discharge Exam:  Please see final progress note for appropriate discharge exam    Disposition: SNF    In process/preliminary results:  Outstanding Order Results       No orders found from 5/7/2025 to 6/6/2025.            Patient Instructions:   Current Discharge Medication List        START taking these medications    Details   docusate sodium (COLACE, DULCOLAX) 100 MG CAPS Take 100 mg by mouth 2 times

## 2025-06-12 NOTE — CARE COORDINATION
6/12/25, 11:10 AM EDT    DISCHARGE PLANNING EVALUATION    Spoke with patient and family, transport available at 1:00 pm today, confirmed plan with Binu Johnson     6/12/25, 11:11 AM EDT    Patient goals/plan/ treatment preferences discussed by  and .  Patient goals/plan/ treatment preferences reviewed with patient/ family.  Patient/ family verbalize understanding of discharge plan and are in agreement with goal/plan/treatment preferences.  Understanding was demonstrated using the teach back method.  AVS provided by RN at time of discharge, which includes all necessary medical information pertaining to the patients current course of illness, treatment, post-discharge goals of care, and treatment preferences.     Services At/After Discharge: Skilled Nursing Facility (SNF) and In ambulance

## 2025-06-12 NOTE — PLAN OF CARE
Problem: Discharge Planning  Goal: Discharge to home or other facility with appropriate resources  6/11/2025 2313 by Moriah Espinoza, RN  Outcome: Progressing  Flowsheets (Taken 6/11/2025 2010)  Discharge to home or other facility with appropriate resources:   Identify barriers to discharge with patient and caregiver   Identify discharge learning needs (meds, wound care, etc)   Arrange for needed discharge resources and transportation as appropriate   Refer to discharge planning if patient needs post-hospital services based on physician order or complex needs related to functional status, cognitive ability or social support system  6/11/2025 1200 by Viviana Dotson, RN  Outcome: Progressing  Flowsheets (Taken 6/11/2025 0121 by Maria Esther Turner, RN)  Discharge to home or other facility with appropriate resources:   Identify barriers to discharge with patient and caregiver   Arrange for needed discharge resources and transportation as appropriate   Identify discharge learning needs (meds, wound care, etc)   Refer to discharge planning if patient needs post-hospital services based on physician order or complex needs related to functional status, cognitive ability or social support system     Problem: Pain  Goal: Verbalizes/displays adequate comfort level or baseline comfort level  6/11/2025 2313 by Moriah Espinoza, RN  Outcome: Progressing  6/11/2025 1200 by Viviana Dotson, RN  Outcome: Progressing  Flowsheets (Taken 6/11/2025 0121 by Maria Esther Turner, RN)  Verbalizes/displays adequate comfort level or baseline comfort level:   Encourage patient to monitor pain and request assistance   Assess pain using appropriate pain scale   Administer analgesics based on type and severity of pain and evaluate response   Implement non-pharmacological measures as appropriate and evaluate response   Notify Licensed Independent Practitioner if interventions unsuccessful or patient reports new pain     Problem: Safety - Adult  Goal: Free from fall 
  Problem: Discharge Planning  Goal: Discharge to home or other facility with appropriate resources  6/6/2025 0953 by Karey Bess RN  Outcome: Progressing  Flowsheets (Taken 6/6/2025 0745)  Discharge to home or other facility with appropriate resources:   Identify barriers to discharge with patient and caregiver   Arrange for needed discharge resources and transportation as appropriate   Identify discharge learning needs (meds, wound care, etc)   Refer to discharge planning if patient needs post-hospital services based on physician order or complex needs related to functional status, cognitive ability or social support system  6/6/2025 0140 by Medardo Espinoza RN  Outcome: Progressing     Problem: Pain  Goal: Verbalizes/displays adequate comfort level or baseline comfort level  6/6/2025 0953 by Karey Bess RN  Outcome: Progressing  6/6/2025 0140 by Medardo Espinoza RN  Outcome: Progressing     Problem: Safety - Adult  Goal: Free from fall injury  6/6/2025 0953 by Karey Bess RN  Outcome: Progressing  6/6/2025 0140 by Medardo Espinoza RN  Outcome: Progressing     Problem: Skin/Tissue Integrity  Goal: Skin integrity remains intact  Description: 1.  Monitor for areas of redness and/or skin breakdown2.  Assess vascular access sites hourly3.  Every 4-6 hours minimum:  Change oxygen saturation probe site4.  Every 4-6 hours:  If on nasal continuous positive airway pressure, respiratory therapy assess nares and determine need for appliance change or resting period  6/6/2025 0953 by Karey Bess RN  Outcome: Progressing  Flowsheets (Taken 6/6/2025 0745)  Skin Integrity Remains Intact: Monitor for areas of redness and/or skin breakdown  6/6/2025 0140 by Medardo Espinoza RN  Outcome: Progressing     Problem: ABCDS Injury Assessment  Goal: Absence of physical injury  6/6/2025 0953 by Karey Bess RN  Outcome: Progressing  6/6/2025 0140 by Medardo Espinoza RN  Outcome: Progressing     
  Problem: Discharge Planning  Goal: Discharge to home or other facility with appropriate resources  6/7/2025 2226 by Anna Holguin RN  Outcome: Progressing     Problem: Pain  Goal: Verbalizes/displays adequate comfort level or baseline comfort level  6/7/2025 2226 by Anna Holguin RN  Outcome: Progressing     Problem: Safety - Adult  Goal: Free from fall injury  6/7/2025 2226 by Anna Holguin RN  Outcome: Progressing     Problem: Skin/Tissue Integrity  Goal: Skin integrity remains intact  Description: 1.  Monitor for areas of redness and/or skin breakdown2.  Assess vascular access sites hourly3.  Every 4-6 hours minimum:  Change oxygen saturation probe site4.  Every 4-6 hours:  If on nasal continuous positive airway pressure, respiratory therapy assess nares and determine need for appliance change or resting period  6/7/2025 2226 by Anna Holguin RN  Outcome: Progressing     Problem: ABCDS Injury Assessment  Goal: Absence of physical injury  6/7/2025 2226 by Anna Holguin, RN  Outcome: Progressing     
  Problem: Discharge Planning  Goal: Discharge to home or other facility with appropriate resources  6/8/2025 1104 by Allison Metcalf LPN  Outcome: Progressing  6/7/2025 2226 by Anna Holguin RN  Outcome: Progressing     Problem: Pain  Goal: Verbalizes/displays adequate comfort level or baseline comfort level  6/8/2025 1104 by Allison Metcalf LPN  Outcome: Progressing  6/7/2025 2226 by Anna Holguin RN  Outcome: Progressing     Problem: Safety - Adult  Goal: Free from fall injury  6/8/2025 1104 by Allison Metcalf LPN  Outcome: Progressing  6/7/2025 2226 by Anna Holguin RN  Outcome: Progressing     Problem: Skin/Tissue Integrity  Goal: Skin integrity remains intact  Description: 1.  Monitor for areas of redness and/or skin breakdown2.  Assess vascular access sites hourly3.  Every 4-6 hours minimum:  Change oxygen saturation probe site4.  Every 4-6 hours:  If on nasal continuous positive airway pressure, respiratory therapy assess nares and determine need for appliance change or resting period  6/8/2025 1104 by Allison Metcalf LPN  Outcome: Progressing  6/7/2025 2226 by Anna Holguin RN  Outcome: Progressing     Problem: ABCDS Injury Assessment  Goal: Absence of physical injury  6/8/2025 1104 by Allison Metcalf LPN  Outcome: Progressing  6/7/2025 2226 by Anna Holguin RN  Outcome: Progressing   Care plan reviewed with patient and  verbalize understanding of the plan of care and contribute to goal setting.     
  Problem: Discharge Planning  Goal: Discharge to home or other facility with appropriate resources  Outcome: Progressing     Problem: Pain  Goal: Verbalizes/displays adequate comfort level or baseline comfort level  Outcome: Progressing     Problem: Safety - Adult  Goal: Free from fall injury  Outcome: Progressing     Problem: Skin/Tissue Integrity  Goal: Skin integrity remains intact  Outcome: Progressing     Problem: ABCDS Injury Assessment  Goal: Absence of physical injury  Outcome: Progressing     
  Problem: Discharge Planning  Goal: Discharge to home or other facility with appropriate resources  Outcome: Progressing  Flowsheets (Taken 6/6/2025 0745 by Karey Bess, RN)  Discharge to home or other facility with appropriate resources:   Identify barriers to discharge with patient and caregiver   Arrange for needed discharge resources and transportation as appropriate   Identify discharge learning needs (meds, wound care, etc)   Refer to discharge planning if patient needs post-hospital services based on physician order or complex needs related to functional status, cognitive ability or social support system     Problem: Pain  Goal: Verbalizes/displays adequate comfort level or baseline comfort level  Outcome: Progressing  Flowsheets (Taken 6/7/2025 0113 by Oscar Tinoco, RN)  Verbalizes/displays adequate comfort level or baseline comfort level:   Encourage patient to monitor pain and request assistance   Assess pain using appropriate pain scale   Administer analgesics based on type and severity of pain and evaluate response   Implement non-pharmacological measures as appropriate and evaluate response     Problem: Safety - Adult  Goal: Free from fall injury  Outcome: Progressing  Flowsheets (Taken 6/7/2025 1558)  Free From Fall Injury: Instruct family/caregiver on patient safety     Problem: Skin/Tissue Integrity  Goal: Skin integrity remains intact  Description: 1.  Monitor for areas of redness and/or skin breakdown2.  Assess vascular access sites hourly3.  Every 4-6 hours minimum:  Change oxygen saturation probe site4.  Every 4-6 hours:  If on nasal continuous positive airway pressure, respiratory therapy assess nares and determine need for appliance change or resting period  Outcome: Progressing  Flowsheets (Taken 6/7/2025 1345)  Skin Integrity Remains Intact:   Monitor for areas of redness and/or skin breakdown   Turn and reposition as indicated     Problem: ABCDS Injury Assessment  Goal: Absence of 
  Problem: Pain  Goal: Verbalizes/displays adequate comfort level or baseline comfort level  6/10/2025 0044 by Sera Richards RN  Outcome: Progressing     Problem: Safety - Adult  Goal: Free from fall injury  6/10/2025 0044 by Sera Richards RN  Outcome: Progressing     Problem: Skin/Tissue Integrity  Goal: Skin integrity remains intact  Description: 1.  Monitor for areas of redness and/or skin breakdown2.  Assess vascular access sites hourly3.  Every 4-6 hours minimum:  Change oxygen saturation probe site4.  Every 4-6 hours:  If on nasal continuous positive airway pressure, respiratory therapy assess nares and determine need for appliance change or resting period  6/10/2025 0044 by Sera Richards RN  Outcome: Progressing   Care plan reviewed with patient.  Patient verbalize understanding of the plan of care and contribute to goal setting.     
  Problem: Respiratory - Adult  Goal: Achieves optimal ventilation and oxygenation  6/10/2025 0844 by Jose A Pichardo, P  Outcome: Progressing     Problem: Respiratory - Adult  Goal: Lung sounds clear or within normal limits for patient  Outcome: Progressing   Patient mutually agreed on goals.    
  Problem: Respiratory - Adult  Goal: Achieves optimal ventilation and oxygenation  6/9/2025 2140 by Zeyad Real RCP  Outcome: Progressing     
  Problem: Respiratory - Adult  Goal: Achieves optimal ventilation and oxygenation  Outcome: Progressing     
  Problem: Respiratory - Adult  Goal: Achieves optimal ventilation and oxygenation  Outcome: Progressing     
  Problem: Respiratory - Adult  Goal: Lung sounds clear or within normal limits for patient  Outcome: Progressing     
Considering snf   
bed/mattress (bed type)   Monitor skin under medical devices   Turn and reposition as indicated     Problem: ABCDS Injury Assessment  Goal: Absence of physical injury  Outcome: Progressing  Flowsheets (Taken 6/11/2025 0121)  Absence of Physical Injury: Implement safety measures based on patient assessment     Problem: Respiratory - Adult  Goal: Achieves optimal ventilation and oxygenation  Outcome: Progressing  Flowsheets (Taken 6/11/2025 0121)  Achieves optimal ventilation and oxygenation:   Assess for changes in respiratory status   Assess for changes in mentation and behavior   Position to facilitate oxygenation and minimize respiratory effort   Oxygen supplementation based on oxygen saturation or arterial blood gases   Assess and instruct to report shortness of breath or any respiratory difficulty   Respiratory therapy support as indicated     
devices     Problem: ABCDS Injury Assessment  Goal: Absence of physical injury  Outcome: Progressing  Flowsheets (Taken 6/7/2025 0113)  Absence of Physical Injury: Implement safety measures based on patient assessment   Care plan reviewed with patient.  Patient verbalize understanding of the plan of care and contribute to goal setting.    
patient assessment     Problem: Respiratory - Adult  Goal: Achieves optimal ventilation and oxygenation  Outcome: Progressing  Flowsheets  Taken 6/9/2025 1734 by Milly Mendoza LPN  Achieves optimal ventilation and oxygenation:   Assess for changes in respiratory status   Assess for changes in mentation and behavior   Position to facilitate oxygenation and minimize respiratory effort   Oxygen supplementation based on oxygen saturation or arterial blood gases  Taken 6/9/2025 1554 by Luisana Villa RCP  Achieves optimal ventilation and oxygenation:   Assess for changes in respiratory status   Respiratory therapy support as indicated   Assess and instruct to report shortness of breath or any respiratory difficulty  Taken 6/9/2025 0812 by Luisana Villa RCP  Achieves optimal ventilation and oxygenation:   Assess for changes in respiratory status   Respiratory therapy support as indicated   Assess and instruct to report shortness of breath or any respiratory difficulty   Care plan reviewed with patient.  Patient verbalized understanding of the plan of care and contribute to goal setting.     
any respiratory difficulty   Respiratory therapy support as indicated  Goal: Lung sounds clear or within normal limits for patient  6/11/2025 0854 by Maxine Ramirez, RCP  Outcome: Progressing

## 2025-06-13 ENCOUNTER — TELEPHONE (OUTPATIENT)
Dept: PSYCHIATRY | Age: 75
End: 2025-06-13

## 2025-06-13 NOTE — TELEPHONE ENCOUNTER
Patient return call from [facility] to review SBIRT results, and confirm current use of outpatient program for alcohol, drug or mental health if needed. Patient denied need for outpatient and inpatient program for alcohol, drug and mental health.

## 2025-06-13 NOTE — TELEPHONE ENCOUNTER
attempted to contacted patient to review SBIRT results, and confirm current use of outpatient program for alcohol, drug or mental health if needed.  proceeded to leave guided message instruction patient to return call.

## 2025-07-06 LAB — ECHO BSA: 1.61 M2

## 2025-07-16 ENCOUNTER — TELEPHONE (OUTPATIENT)
Dept: PULMONOLOGY | Age: 75
End: 2025-07-16

## 2025-08-13 ENCOUNTER — CLINICAL SUPPORT (OUTPATIENT)
Dept: PULMONOLOGY | Age: 75
End: 2025-08-13

## 2025-08-13 VITALS
TEMPERATURE: 98.1 F | DIASTOLIC BLOOD PRESSURE: 64 MMHG | OXYGEN SATURATION: 99 % | SYSTOLIC BLOOD PRESSURE: 128 MMHG | HEIGHT: 63 IN | WEIGHT: 129.6 LBS | HEART RATE: 68 BPM | BODY MASS INDEX: 22.96 KG/M2

## 2025-08-13 DIAGNOSIS — J44.89 ASTHMA-COPD OVERLAP SYNDROME (HCC): Primary | ICD-10-CM

## 2025-08-13 ASSESSMENT — LIFESTYLE VARIABLES
HOW OFTEN DO YOU HAVE A DRINK CONTAINING ALCOHOL: MONTHLY OR LESS
HOW MANY STANDARD DRINKS CONTAINING ALCOHOL DO YOU HAVE ON A TYPICAL DAY: 1 OR 2

## 2025-08-27 ENCOUNTER — OFFICE VISIT (OUTPATIENT)
Dept: PULMONOLOGY | Age: 75
End: 2025-08-27
Payer: MEDICARE

## 2025-08-27 VITALS
TEMPERATURE: 97.8 F | HEART RATE: 78 BPM | SYSTOLIC BLOOD PRESSURE: 130 MMHG | DIASTOLIC BLOOD PRESSURE: 64 MMHG | WEIGHT: 130 LBS | HEIGHT: 63 IN | BODY MASS INDEX: 23.04 KG/M2 | OXYGEN SATURATION: 98 %

## 2025-08-27 DIAGNOSIS — J44.89 ASTHMA-COPD OVERLAP SYNDROME (HCC): Primary | ICD-10-CM

## 2025-08-27 DIAGNOSIS — Z72.0 TOBACCO ABUSE: ICD-10-CM

## 2025-08-27 DIAGNOSIS — Z87.891 PERSONAL HISTORY OF TOBACCO USE: ICD-10-CM

## 2025-08-27 PROCEDURE — 1160F RVW MEDS BY RX/DR IN RCRD: CPT | Performed by: INTERNAL MEDICINE

## 2025-08-27 PROCEDURE — 1123F ACP DISCUSS/DSCN MKR DOCD: CPT | Performed by: INTERNAL MEDICINE

## 2025-08-27 PROCEDURE — G0296 VISIT TO DETERM LDCT ELIG: HCPCS | Performed by: INTERNAL MEDICINE

## 2025-08-27 PROCEDURE — 1159F MED LIST DOCD IN RCRD: CPT | Performed by: INTERNAL MEDICINE

## 2025-08-27 PROCEDURE — 96372 THER/PROPH/DIAG INJ SC/IM: CPT | Performed by: INTERNAL MEDICINE

## 2025-08-27 PROCEDURE — 99214 OFFICE O/P EST MOD 30 MIN: CPT | Performed by: INTERNAL MEDICINE

## 2025-08-27 RX ORDER — VARENICLINE TARTRATE 1 MG/1
1 TABLET, FILM COATED ORAL 2 TIMES DAILY
Qty: 60 TABLET | Refills: 3 | Status: SHIPPED | OUTPATIENT
Start: 2025-08-27 | End: 2025-12-25

## 2025-08-27 ASSESSMENT — ENCOUNTER SYMPTOMS
COUGH: 1
VOICE CHANGE: 0
HEMOPTYSIS: 0
SPUTUM PRODUCTION: 1
CHEST TIGHTNESS: 0
WHEEZING: 0
TROUBLE SWALLOWING: 0
SHORTNESS OF BREATH: 1
HEARTBURN: 0
FREQUENT THROAT CLEARING: 1
DIFFICULTY BREATHING: 1

## 2025-08-27 ASSESSMENT — COPD QUESTIONNAIRES: COPD: 1

## (undated) DEVICE — APPLICATOR MEDICATED 26 CC SOLUTION HI LT ORNG CHLORAPREP

## (undated) DEVICE — C-ARM: Brand: UNBRANDED

## (undated) DEVICE — PENCIL SMK EVAC ALL IN 1 DSGN ENH VISIBILITY IMPROVED AIR

## (undated) DEVICE — SUTURE VIC + LEN CP1 0 70CM VCP267H

## (undated) DEVICE — SUTURE VCRL + SZ 2-0 L27IN ABSRB UD CP-1 1/2 CIR REV CUT VCP266H

## (undated) DEVICE — PACK PROCEDURE SURG ORTH BASIC SRHP LF

## (undated) DEVICE — ADHESIVE SKIN CLSR 0.7ML TOP DERMBND ADV

## (undated) DEVICE — 450 ML BOTTLE OF 0.05% CHLORHEXIDINE GLUCONATE IN 99.95% STERILE WATER FOR IRRIGATION, USP AND APPLICATOR.: Brand: IRRISEPT ANTIMICROBIAL WOUND LAVAGE

## (undated) DEVICE — BASIC SINGLE BASIN BTC-LF: Brand: MEDLINE INDUSTRIES, INC.

## (undated) DEVICE — GOWN,SIRUS,NONRNF,SETINSLV,XL,20/CS: Brand: MEDLINE

## (undated) DEVICE — BAG,BANDED,W/RUBBERBAND,STERILE,30X36: Brand: MEDLINE

## (undated) DEVICE — SPONGE GZ W4XL4IN COT 12 PLY TYP VII WVN C FLD DSGN

## (undated) DEVICE — GUIDE PIN 3.2MM X 343MM: Brand: TRIGEN

## (undated) DEVICE — PACK-MAJOR

## (undated) DEVICE — GLOVE SURG SZ 75 L12IN FNGR THK94MIL TRNSLUC YEL LTX

## (undated) DEVICE — SPONGE LAP W18XL18IN WHT COT 4 PLY FLD STRUNG RADPQ DISP ST

## (undated) DEVICE — DRESSING,GAUZE,XEROFORM,CURAD,5"X9",ST: Brand: CURAD

## (undated) DEVICE — PACK PROCEDURE SURG SET UP SRMC

## (undated) DEVICE — C-ARMOR C-ARM EQUIPMENT COVERS CLEAR STERILE UNIVERSAL FIT 12 PER CASE: Brand: C-ARMOR

## (undated) DEVICE — 4.0MM SHORT PILOT DRILL WITH AO CONNECTOR: Brand: TRIGEN